# Patient Record
Sex: MALE | Race: WHITE | Employment: OTHER | URBAN - METROPOLITAN AREA
[De-identification: names, ages, dates, MRNs, and addresses within clinical notes are randomized per-mention and may not be internally consistent; named-entity substitution may affect disease eponyms.]

---

## 2017-02-08 ENCOUNTER — HOSPITAL ENCOUNTER (OUTPATIENT)
Age: 77
Setting detail: SPECIMEN
Discharge: HOME | End: 2017-02-08
Payer: MEDICARE

## 2017-02-21 LAB — DERMATOLOGY PATHOLOGY REPORT: NORMAL

## 2017-09-19 ENCOUNTER — HOSPITAL ENCOUNTER (INPATIENT)
Age: 77
LOS: 7 days | Discharge: NURSING, REHAB OR LONG-TERM CARE | DRG: 853 | End: 2017-09-26
Attending: EMERGENCY MEDICINE | Admitting: INTERNAL MEDICINE
Payer: MEDICARE

## 2017-09-19 ENCOUNTER — APPOINTMENT (OUTPATIENT)
Dept: CT IMAGING | Age: 77
DRG: 853 | End: 2017-09-19
Payer: MEDICARE

## 2017-09-19 ENCOUNTER — APPOINTMENT (OUTPATIENT)
Dept: GENERAL RADIOLOGY | Age: 77
DRG: 853 | End: 2017-09-19
Payer: MEDICARE

## 2017-09-19 DIAGNOSIS — N39.0 URINARY TRACT INFECTION WITHOUT HEMATURIA, SITE UNSPECIFIED: ICD-10-CM

## 2017-09-19 DIAGNOSIS — M54.16 LUMBAR RADICULOPATHY: Primary | ICD-10-CM

## 2017-09-19 DIAGNOSIS — E87.1 HYPONATREMIA: ICD-10-CM

## 2017-09-19 DIAGNOSIS — R79.89 ABNORMAL LIVER FUNCTION TESTS: ICD-10-CM

## 2017-09-19 DIAGNOSIS — J44.1 COPD EXACERBATION (HCC): ICD-10-CM

## 2017-09-19 LAB
% CKMB: 0.9 % (ref 0–3.5)
-: ABNORMAL
ABSOLUTE EOS #: 0 K/UL (ref 0–0.4)
ABSOLUTE LYMPH #: 0.73 K/UL (ref 1–4.8)
ABSOLUTE MONO #: 0.97 K/UL (ref 0.2–0.8)
ALBUMIN SERPL-MCNC: 3 G/DL (ref 3.5–5.2)
ALBUMIN/GLOBULIN RATIO: ABNORMAL (ref 1–2.5)
ALP BLD-CCNC: 64 U/L (ref 40–129)
ALT SERPL-CCNC: 55 U/L (ref 5–41)
AMMONIA: 27 UMOL/L (ref 16–60)
AMORPHOUS: ABNORMAL
AMYLASE: 33 U/L (ref 28–100)
ANION GAP SERPL CALCULATED.3IONS-SCNC: 20 MMOL/L (ref 9–17)
AST SERPL-CCNC: 84 U/L
BACTERIA: ABNORMAL
BASOPHILS # BLD: 0 %
BASOPHILS ABSOLUTE: 0 K/UL (ref 0–0.2)
BILIRUB SERPL-MCNC: 2.98 MG/DL (ref 0.3–1.2)
BILIRUBIN DIRECT: 1.21 MG/DL
BILIRUBIN URINE: NEGATIVE
BILIRUBIN, INDIRECT: 1.77 MG/DL (ref 0–1)
BNP INTERPRETATION: ABNORMAL
BUN BLDV-MCNC: 23 MG/DL (ref 8–23)
BUN/CREAT BLD: 22 (ref 9–20)
CALCIUM SERPL-MCNC: 8.7 MG/DL (ref 8.6–10.4)
CASTS UA: ABNORMAL /LPF
CHLORIDE BLD-SCNC: 88 MMOL/L (ref 98–107)
CHP ED QC CHECK: NORMAL
CK MB: 4.9 NG/ML
CKMB INTERPRETATION: ABNORMAL
CO2: 20 MMOL/L (ref 20–31)
COLOR: YELLOW
COMMENT UA: ABNORMAL
CORTISOL COLLECTION INFO: NORMAL
CORTISOL: 36.7 UG/DL
CREAT SERPL-MCNC: 1.05 MG/DL (ref 0.7–1.2)
CRYSTALS, UA: ABNORMAL /HPF
DIFFERENTIAL TYPE: ABNORMAL
EOSINOPHILS RELATIVE PERCENT: 0 %
EPITHELIAL CELLS UA: ABNORMAL /HPF
GFR AFRICAN AMERICAN: >60 ML/MIN
GFR NON-AFRICAN AMERICAN: >60 ML/MIN
GFR SERPL CREATININE-BSD FRML MDRD: ABNORMAL ML/MIN/{1.73_M2}
GFR SERPL CREATININE-BSD FRML MDRD: ABNORMAL ML/MIN/{1.73_M2}
GLOBULIN: ABNORMAL G/DL (ref 1.5–3.8)
GLUCOSE BLD-MCNC: 141 MG/DL (ref 70–99)
GLUCOSE BLD-MCNC: 176 MG/DL (ref 75–110)
GLUCOSE URINE: NEGATIVE
HAV IGM SER IA-ACNC: NONREACTIVE
HCT VFR BLD CALC: 48.4 % (ref 41–53)
HEMOGLOBIN: 16.7 G/DL (ref 13.5–17.5)
HEPATITIS B CORE IGM ANTIBODY: NONREACTIVE
HEPATITIS B SURFACE ANTIGEN: NONREACTIVE
HEPATITIS C ANTIBODY: NONREACTIVE
INR BLD: 1.1
KETONES, URINE: NEGATIVE
LACTIC ACID: 2.6 MMOL/L (ref 0.5–2.2)
LACTIC ACID: 3.4 MMOL/L (ref 0.5–2.2)
LACTIC ACID: 4.6 MMOL/L (ref 0.5–2.2)
LEUKOCYTE ESTERASE, URINE: ABNORMAL
LIPASE: 31 U/L (ref 13–60)
LIPASE: 43 U/L (ref 13–60)
LYMPHOCYTES # BLD: 6 %
MAGNESIUM: 2 MG/DL (ref 1.6–2.6)
MCH RBC QN AUTO: 30.3 PG (ref 26–34)
MCHC RBC AUTO-ENTMCNC: 34.6 G/DL (ref 31–37)
MCV RBC AUTO: 87.7 FL (ref 80–100)
MONOCYTES # BLD: 8 %
MORPHOLOGY: ABNORMAL
MUCUS: ABNORMAL
MYOGLOBIN: 307 NG/ML (ref 28–72)
NITRITE, URINE: POSITIVE
OTHER OBSERVATIONS UA: ABNORMAL
PARTIAL THROMBOPLASTIN TIME: 28.8 SEC (ref 23–31)
PDW BLD-RTO: 12.8 % (ref 11.5–14.5)
PH UA: 6 (ref 5–8)
PLATELET # BLD: 67 K/UL (ref 130–400)
PLATELET ESTIMATE: ABNORMAL
PMV BLD AUTO: ABNORMAL FL (ref 6–12)
POTASSIUM SERPL-SCNC: 3.7 MMOL/L (ref 3.7–5.3)
PRO-BNP: 2309 PG/ML
PROTEIN UA: ABNORMAL
PROTHROMBIN TIME: 11.4 SEC (ref 9.7–11.6)
RBC # BLD: 5.51 M/UL (ref 4.5–5.9)
RBC # BLD: ABNORMAL 10*6/UL
RBC UA: ABNORMAL /HPF (ref 0–2)
RENAL EPITHELIAL, UA: ABNORMAL /HPF
SEG NEUTROPHILS: 86 %
SEGMENTED NEUTROPHILS ABSOLUTE COUNT: 10.4 K/UL (ref 1.8–7.7)
SODIUM BLD-SCNC: 128 MMOL/L (ref 135–144)
SPECIFIC GRAVITY UA: 1.02 (ref 1–1.03)
TOTAL CK: 539 U/L (ref 39–308)
TOTAL PROTEIN: 7.1 G/DL (ref 6.4–8.3)
TRICHOMONAS: ABNORMAL
TROPONIN INTERP: NORMAL
TROPONIN T: <0.03 NG/ML
TSH SERPL DL<=0.05 MIU/L-ACNC: 0.8 MIU/L (ref 0.3–5)
TURBIDITY: ABNORMAL
URINE HGB: ABNORMAL
UROBILINOGEN, URINE: NORMAL
WBC # BLD: 12.1 K/UL (ref 3.5–11)
WBC # BLD: ABNORMAL 10*3/UL
WBC UA: ABNORMAL /HPF (ref 0–5)
YEAST: ABNORMAL

## 2017-09-19 PROCEDURE — 83690 ASSAY OF LIPASE: CPT

## 2017-09-19 PROCEDURE — 81001 URINALYSIS AUTO W/SCOPE: CPT

## 2017-09-19 PROCEDURE — 6370000000 HC RX 637 (ALT 250 FOR IP): Performed by: INTERNAL MEDICINE

## 2017-09-19 PROCEDURE — 83605 ASSAY OF LACTIC ACID: CPT

## 2017-09-19 PROCEDURE — 71010 XR CHEST PORTABLE: CPT

## 2017-09-19 PROCEDURE — 85610 PROTHROMBIN TIME: CPT

## 2017-09-19 PROCEDURE — 83735 ASSAY OF MAGNESIUM: CPT

## 2017-09-19 PROCEDURE — 74177 CT ABD & PELVIS W/CONTRAST: CPT

## 2017-09-19 PROCEDURE — 96375 TX/PRO/DX INJ NEW DRUG ADDON: CPT

## 2017-09-19 PROCEDURE — 85025 COMPLETE CBC W/AUTO DIFF WBC: CPT

## 2017-09-19 PROCEDURE — 82550 ASSAY OF CK (CPK): CPT

## 2017-09-19 PROCEDURE — 82947 ASSAY GLUCOSE BLOOD QUANT: CPT

## 2017-09-19 PROCEDURE — 84134 ASSAY OF PREALBUMIN: CPT

## 2017-09-19 PROCEDURE — 80076 HEPATIC FUNCTION PANEL: CPT

## 2017-09-19 PROCEDURE — 85730 THROMBOPLASTIN TIME PARTIAL: CPT

## 2017-09-19 PROCEDURE — 72131 CT LUMBAR SPINE W/O DYE: CPT

## 2017-09-19 PROCEDURE — 93005 ELECTROCARDIOGRAM TRACING: CPT

## 2017-09-19 PROCEDURE — 83874 ASSAY OF MYOGLOBIN: CPT

## 2017-09-19 PROCEDURE — 2580000003 HC RX 258: Performed by: INTERNAL MEDICINE

## 2017-09-19 PROCEDURE — 6360000002 HC RX W HCPCS: Performed by: INTERNAL MEDICINE

## 2017-09-19 PROCEDURE — 80074 ACUTE HEPATITIS PANEL: CPT

## 2017-09-19 PROCEDURE — 86403 PARTICLE AGGLUT ANTBDY SCRN: CPT

## 2017-09-19 PROCEDURE — 36415 COLL VENOUS BLD VENIPUNCTURE: CPT

## 2017-09-19 PROCEDURE — 83880 ASSAY OF NATRIURETIC PEPTIDE: CPT

## 2017-09-19 PROCEDURE — 84484 ASSAY OF TROPONIN QUANT: CPT

## 2017-09-19 PROCEDURE — 84443 ASSAY THYROID STIM HORMONE: CPT

## 2017-09-19 PROCEDURE — 82553 CREATINE MB FRACTION: CPT

## 2017-09-19 PROCEDURE — 96365 THER/PROPH/DIAG IV INF INIT: CPT

## 2017-09-19 PROCEDURE — 87147 CULTURE TYPE IMMUNOLOGIC: CPT

## 2017-09-19 PROCEDURE — 94640 AIRWAY INHALATION TREATMENT: CPT

## 2017-09-19 PROCEDURE — 87040 BLOOD CULTURE FOR BACTERIA: CPT

## 2017-09-19 PROCEDURE — 87205 SMEAR GRAM STAIN: CPT

## 2017-09-19 PROCEDURE — 82150 ASSAY OF AMYLASE: CPT

## 2017-09-19 PROCEDURE — 82533 TOTAL CORTISOL: CPT

## 2017-09-19 PROCEDURE — 2580000003 HC RX 258: Performed by: EMERGENCY MEDICINE

## 2017-09-19 PROCEDURE — 82140 ASSAY OF AMMONIA: CPT

## 2017-09-19 PROCEDURE — 87149 DNA/RNA DIRECT PROBE: CPT

## 2017-09-19 PROCEDURE — 87086 URINE CULTURE/COLONY COUNT: CPT

## 2017-09-19 PROCEDURE — 6360000002 HC RX W HCPCS: Performed by: EMERGENCY MEDICINE

## 2017-09-19 PROCEDURE — 71260 CT THORAX DX C+: CPT

## 2017-09-19 PROCEDURE — 6360000004 HC RX CONTRAST MEDICATION: Performed by: EMERGENCY MEDICINE

## 2017-09-19 PROCEDURE — 83036 HEMOGLOBIN GLYCOSYLATED A1C: CPT

## 2017-09-19 PROCEDURE — 87186 SC STD MICRODIL/AGAR DIL: CPT

## 2017-09-19 PROCEDURE — 80048 BASIC METABOLIC PNL TOTAL CA: CPT

## 2017-09-19 PROCEDURE — 99285 EMERGENCY DEPT VISIT HI MDM: CPT

## 2017-09-19 PROCEDURE — 2060000000 HC ICU INTERMEDIATE R&B

## 2017-09-19 PROCEDURE — 94760 N-INVAS EAR/PLS OXIMETRY 1: CPT

## 2017-09-19 RX ORDER — CETIRIZINE HYDROCHLORIDE 10 MG/1
10 TABLET ORAL DAILY
Status: DISCONTINUED | OUTPATIENT
Start: 2017-09-19 | End: 2017-09-26 | Stop reason: HOSPADM

## 2017-09-19 RX ORDER — ALBUTEROL SULFATE 90 UG/1
2 AEROSOL, METERED RESPIRATORY (INHALATION)
Status: DISCONTINUED | OUTPATIENT
Start: 2017-09-19 | End: 2017-09-19

## 2017-09-19 RX ORDER — METHYLPREDNISOLONE SODIUM SUCCINATE 40 MG/ML
40 INJECTION, POWDER, LYOPHILIZED, FOR SOLUTION INTRAMUSCULAR; INTRAVENOUS EVERY 6 HOURS
Status: DISCONTINUED | OUTPATIENT
Start: 2017-09-19 | End: 2017-09-20

## 2017-09-19 RX ORDER — AMOXICILLIN 500 MG
1 CAPSULE ORAL DAILY
Status: ON HOLD | COMMUNITY
End: 2017-09-26 | Stop reason: HOSPADM

## 2017-09-19 RX ORDER — SODIUM CHLORIDE 0.9 % (FLUSH) 0.9 %
10 SYRINGE (ML) INJECTION PRN
Status: DISCONTINUED | OUTPATIENT
Start: 2017-09-19 | End: 2017-09-26 | Stop reason: HOSPADM

## 2017-09-19 RX ORDER — 0.9 % SODIUM CHLORIDE 0.9 %
50 INTRAVENOUS SOLUTION INTRAVENOUS ONCE
Status: COMPLETED | OUTPATIENT
Start: 2017-09-19 | End: 2017-09-19

## 2017-09-19 RX ORDER — ALBUTEROL SULFATE 2.5 MG/3ML
5 SOLUTION RESPIRATORY (INHALATION)
Status: DISCONTINUED | OUTPATIENT
Start: 2017-09-19 | End: 2017-09-19

## 2017-09-19 RX ORDER — DEXTROSE MONOHYDRATE 25 G/50ML
12.5 INJECTION, SOLUTION INTRAVENOUS PRN
Status: DISCONTINUED | OUTPATIENT
Start: 2017-09-19 | End: 2017-09-26 | Stop reason: HOSPADM

## 2017-09-19 RX ORDER — CHOLECALCIFEROL (VITAMIN D3) 125 MCG
500 CAPSULE ORAL DAILY
COMMUNITY
End: 2018-02-07 | Stop reason: ALTCHOICE

## 2017-09-19 RX ORDER — DOXAZOSIN MESYLATE 4 MG/1
4 TABLET ORAL NIGHTLY
Status: DISCONTINUED | OUTPATIENT
Start: 2017-09-19 | End: 2017-09-26 | Stop reason: HOSPADM

## 2017-09-19 RX ORDER — ALBUTEROL SULFATE 2.5 MG/3ML
2.5 SOLUTION RESPIRATORY (INHALATION)
Status: DISCONTINUED | OUTPATIENT
Start: 2017-09-20 | End: 2017-09-22

## 2017-09-19 RX ORDER — SODIUM CHLORIDE 0.9 % (FLUSH) 0.9 %
10 SYRINGE (ML) INJECTION 2 TIMES DAILY
Status: DISCONTINUED | OUTPATIENT
Start: 2017-09-19 | End: 2017-09-19 | Stop reason: SDUPTHER

## 2017-09-19 RX ORDER — MORPHINE SULFATE 4 MG/ML
4 INJECTION, SOLUTION INTRAMUSCULAR; INTRAVENOUS ONCE
Status: COMPLETED | OUTPATIENT
Start: 2017-09-19 | End: 2017-09-19

## 2017-09-19 RX ORDER — METHYLPREDNISOLONE SODIUM SUCCINATE 125 MG/2ML
125 INJECTION, POWDER, LYOPHILIZED, FOR SOLUTION INTRAMUSCULAR; INTRAVENOUS ONCE
Status: COMPLETED | OUTPATIENT
Start: 2017-09-19 | End: 2017-09-19

## 2017-09-19 RX ORDER — ALBUTEROL SULFATE 2.5 MG/3ML
2.5 SOLUTION RESPIRATORY (INHALATION)
Status: DISCONTINUED | OUTPATIENT
Start: 2017-09-19 | End: 2017-09-20 | Stop reason: SDUPTHER

## 2017-09-19 RX ORDER — ACETAMINOPHEN 325 MG/1
650 TABLET ORAL EVERY 4 HOURS PRN
Status: DISCONTINUED | OUTPATIENT
Start: 2017-09-19 | End: 2017-09-19 | Stop reason: SDUPTHER

## 2017-09-19 RX ORDER — AMPICILLIN TRIHYDRATE 250 MG
500 CAPSULE ORAL 2 TIMES DAILY
Status: ON HOLD | COMMUNITY
End: 2017-09-26 | Stop reason: HOSPADM

## 2017-09-19 RX ORDER — SODIUM CHLORIDE 0.9 % (FLUSH) 0.9 %
10 SYRINGE (ML) INJECTION EVERY 12 HOURS SCHEDULED
Status: DISCONTINUED | OUTPATIENT
Start: 2017-09-19 | End: 2017-09-19 | Stop reason: SDUPTHER

## 2017-09-19 RX ORDER — MAGNESIUM OXIDE 400 MG/1
400 TABLET ORAL DAILY
COMMUNITY
End: 2018-02-07 | Stop reason: ALTCHOICE

## 2017-09-19 RX ORDER — IPRATROPIUM BROMIDE AND ALBUTEROL SULFATE 2.5; .5 MG/3ML; MG/3ML
1 SOLUTION RESPIRATORY (INHALATION)
Status: DISCONTINUED | OUTPATIENT
Start: 2017-09-19 | End: 2017-09-19

## 2017-09-19 RX ORDER — ATORVASTATIN CALCIUM 20 MG/1
20 TABLET, FILM COATED ORAL DAILY
Status: DISCONTINUED | OUTPATIENT
Start: 2017-09-20 | End: 2017-09-24

## 2017-09-19 RX ORDER — ATORVASTATIN CALCIUM 20 MG/1
20 TABLET, FILM COATED ORAL DAILY
COMMUNITY
End: 2018-02-07 | Stop reason: ALTCHOICE

## 2017-09-19 RX ORDER — SODIUM CHLORIDE 0.9 % (FLUSH) 0.9 %
10 SYRINGE (ML) INJECTION PRN
Status: DISCONTINUED | OUTPATIENT
Start: 2017-09-19 | End: 2017-09-19 | Stop reason: SDUPTHER

## 2017-09-19 RX ORDER — SODIUM CHLORIDE 0.9 % (FLUSH) 0.9 %
10 SYRINGE (ML) INJECTION EVERY 12 HOURS SCHEDULED
Status: DISCONTINUED | OUTPATIENT
Start: 2017-09-19 | End: 2017-09-26 | Stop reason: HOSPADM

## 2017-09-19 RX ORDER — NICOTINE POLACRILEX 4 MG
15 LOZENGE BUCCAL PRN
Status: DISCONTINUED | OUTPATIENT
Start: 2017-09-19 | End: 2017-09-26 | Stop reason: HOSPADM

## 2017-09-19 RX ORDER — SODIUM CHLORIDE 9 MG/ML
INJECTION, SOLUTION INTRAVENOUS CONTINUOUS
Status: ACTIVE | OUTPATIENT
Start: 2017-09-19 | End: 2017-09-20

## 2017-09-19 RX ORDER — ACETAMINOPHEN 325 MG/1
650 TABLET ORAL EVERY 4 HOURS PRN
Status: DISCONTINUED | OUTPATIENT
Start: 2017-09-19 | End: 2017-09-22 | Stop reason: SDUPTHER

## 2017-09-19 RX ORDER — DEXTROSE MONOHYDRATE 50 MG/ML
100 INJECTION, SOLUTION INTRAVENOUS PRN
Status: DISCONTINUED | OUTPATIENT
Start: 2017-09-19 | End: 2017-09-26 | Stop reason: HOSPADM

## 2017-09-19 RX ORDER — DOXAZOSIN MESYLATE 4 MG/1
4 TABLET ORAL NIGHTLY
COMMUNITY
End: 2018-01-22 | Stop reason: ALTCHOICE

## 2017-09-19 RX ORDER — LEVOFLOXACIN 500 MG/1
500 TABLET, FILM COATED ORAL DAILY
Status: DISCONTINUED | OUTPATIENT
Start: 2017-09-20 | End: 2017-09-20 | Stop reason: ALTCHOICE

## 2017-09-19 RX ORDER — ONDANSETRON 2 MG/ML
4 INJECTION INTRAMUSCULAR; INTRAVENOUS ONCE
Status: COMPLETED | OUTPATIENT
Start: 2017-09-19 | End: 2017-09-19

## 2017-09-19 RX ORDER — CLOTRIMAZOLE AND BETAMETHASONE DIPROPIONATE 10; .64 MG/G; MG/G
CREAM TOPICAL 2 TIMES DAILY
Status: DISCONTINUED | OUTPATIENT
Start: 2017-09-19 | End: 2017-09-26 | Stop reason: HOSPADM

## 2017-09-19 RX ORDER — ONDANSETRON 2 MG/ML
4 INJECTION INTRAMUSCULAR; INTRAVENOUS EVERY 6 HOURS PRN
Status: DISCONTINUED | OUTPATIENT
Start: 2017-09-19 | End: 2017-09-22 | Stop reason: SDUPTHER

## 2017-09-19 RX ADMIN — METHYLPREDNISOLONE SODIUM SUCCINATE 125 MG: 125 INJECTION, POWDER, FOR SOLUTION INTRAMUSCULAR; INTRAVENOUS at 14:36

## 2017-09-19 RX ADMIN — IOPAMIDOL 125 ML: 755 INJECTION, SOLUTION INTRAVENOUS at 12:47

## 2017-09-19 RX ADMIN — CEFTRIAXONE SODIUM 1 G: 1 INJECTION, POWDER, FOR SOLUTION INTRAMUSCULAR; INTRAVENOUS at 14:38

## 2017-09-19 RX ADMIN — CETIRIZINE HYDROCHLORIDE 10 MG: 10 TABLET, FILM COATED ORAL at 23:50

## 2017-09-19 RX ADMIN — Medication 10 ML: at 23:51

## 2017-09-19 RX ADMIN — ALBUTEROL SULFATE 5 MG: 5 SOLUTION RESPIRATORY (INHALATION) at 14:35

## 2017-09-19 RX ADMIN — SODIUM CHLORIDE 50 ML: 9 INJECTION, SOLUTION INTRAVENOUS at 12:48

## 2017-09-19 RX ADMIN — SODIUM CHLORIDE, PRESERVATIVE FREE 10 ML: 5 INJECTION INTRAVENOUS at 12:47

## 2017-09-19 RX ADMIN — CLOTRIMAZOLE AND BETAMETHASONE DIPROPIONATE: 10; .5 CREAM TOPICAL at 23:51

## 2017-09-19 RX ADMIN — MORPHINE SULFATE 4 MG: 4 INJECTION, SOLUTION INTRAMUSCULAR; INTRAVENOUS at 11:14

## 2017-09-19 RX ADMIN — MORPHINE SULFATE 4 MG: 4 INJECTION, SOLUTION INTRAMUSCULAR; INTRAVENOUS at 16:06

## 2017-09-19 RX ADMIN — ALBUTEROL SULFATE 2.5 MG: 2.5 SOLUTION RESPIRATORY (INHALATION) at 21:53

## 2017-09-19 RX ADMIN — METHYLPREDNISOLONE SODIUM SUCCINATE 40 MG: 40 INJECTION, POWDER, FOR SOLUTION INTRAMUSCULAR; INTRAVENOUS at 23:51

## 2017-09-19 RX ADMIN — ONDANSETRON 4 MG: 2 INJECTION INTRAMUSCULAR; INTRAVENOUS at 11:14

## 2017-09-19 ASSESSMENT — ENCOUNTER SYMPTOMS
ABDOMINAL DISTENTION: 1
COUGH: 1
VOMITING: 0
SHORTNESS OF BREATH: 1
WHEEZING: 1
ABDOMINAL PAIN: 1
BACK PAIN: 1

## 2017-09-19 ASSESSMENT — PAIN DESCRIPTION - PAIN TYPE: TYPE: ACUTE PAIN;CHRONIC PAIN

## 2017-09-19 ASSESSMENT — PAIN DESCRIPTION - LOCATION: LOCATION: GENERALIZED;BACK;KNEE

## 2017-09-19 ASSESSMENT — PAIN DESCRIPTION - ORIENTATION: ORIENTATION: RIGHT;LEFT

## 2017-09-19 ASSESSMENT — PAIN SCALES - GENERAL
PAINLEVEL_OUTOF10: 10
PAINLEVEL_OUTOF10: 10
PAINLEVEL_OUTOF10: 4
PAINLEVEL_OUTOF10: 5
PAINLEVEL_OUTOF10: 6

## 2017-09-19 ASSESSMENT — PAIN DESCRIPTION - DESCRIPTORS: DESCRIPTORS: ACHING;SHARP;STABBING

## 2017-09-20 ENCOUNTER — APPOINTMENT (OUTPATIENT)
Dept: GENERAL RADIOLOGY | Age: 77
DRG: 853 | End: 2017-09-20
Payer: MEDICARE

## 2017-09-20 ENCOUNTER — APPOINTMENT (OUTPATIENT)
Dept: ULTRASOUND IMAGING | Age: 77
DRG: 853 | End: 2017-09-20
Payer: MEDICARE

## 2017-09-20 PROBLEM — I71.40 AAA (ABDOMINAL AORTIC ANEURYSM) WITHOUT RUPTURE: Chronic | Status: ACTIVE | Noted: 2017-09-20

## 2017-09-20 LAB
ALBUMIN SERPL-MCNC: 2.4 G/DL (ref 3.5–5.2)
ALBUMIN/GLOBULIN RATIO: ABNORMAL (ref 1–2.5)
ALP BLD-CCNC: 91 U/L (ref 40–129)
ALT SERPL-CCNC: 62 U/L (ref 5–41)
ANION GAP SERPL CALCULATED.3IONS-SCNC: 16 MMOL/L (ref 9–17)
AST SERPL-CCNC: 121 U/L
BILIRUB SERPL-MCNC: 1.78 MG/DL (ref 0.3–1.2)
BILIRUBIN DIRECT: 0.9 MG/DL
BILIRUBIN, INDIRECT: 0.88 MG/DL (ref 0–1)
BUN BLDV-MCNC: 38 MG/DL (ref 8–23)
CALCIUM SERPL-MCNC: 8.3 MG/DL (ref 8.6–10.4)
CHLORIDE BLD-SCNC: 93 MMOL/L (ref 98–107)
CO2: 20 MMOL/L (ref 20–31)
CREAT SERPL-MCNC: 0.96 MG/DL (ref 0.7–1.2)
ESTIMATED AVERAGE GLUCOSE: 123 MG/DL
GFR AFRICAN AMERICAN: >60 ML/MIN
GFR NON-AFRICAN AMERICAN: >60 ML/MIN
GFR SERPL CREATININE-BSD FRML MDRD: ABNORMAL ML/MIN/{1.73_M2}
GFR SERPL CREATININE-BSD FRML MDRD: ABNORMAL ML/MIN/{1.73_M2}
GLUCOSE BLD-MCNC: 177 MG/DL (ref 75–110)
GLUCOSE BLD-MCNC: 199 MG/DL (ref 75–110)
GLUCOSE BLD-MCNC: 203 MG/DL (ref 75–110)
GLUCOSE BLD-MCNC: 206 MG/DL (ref 75–110)
GLUCOSE BLD-MCNC: 244 MG/DL (ref 70–99)
HBA1C MFR BLD: 5.9 % (ref 4–6)
HCT VFR BLD CALC: 41.5 % (ref 41–53)
HEMOGLOBIN: 14.2 G/DL (ref 13.5–17.5)
INR BLD: 1.1
LV EF: 55 %
LVEF MODALITY: NORMAL
MCH RBC QN AUTO: 30.5 PG (ref 26–34)
MCHC RBC AUTO-ENTMCNC: 34.3 G/DL (ref 31–37)
MCV RBC AUTO: 89.1 FL (ref 80–100)
PARTIAL THROMBOPLASTIN TIME: 27.5 SEC (ref 23–31)
PDW BLD-RTO: 13.8 % (ref 11.5–14.5)
PLATELET # BLD: 69 K/UL (ref 130–400)
PMV BLD AUTO: 10.7 FL (ref 6–12)
POTASSIUM SERPL-SCNC: 4.1 MMOL/L (ref 3.7–5.3)
PREALBUMIN: 4.5 MG/DL (ref 20–40)
PROTHROMBIN TIME: 10.9 SEC (ref 9.7–11.6)
RBC # BLD: 4.65 M/UL (ref 4.5–5.9)
SODIUM BLD-SCNC: 129 MMOL/L (ref 135–144)
TOTAL PROTEIN: 6 G/DL (ref 6.4–8.3)
WBC # BLD: 10.7 K/UL (ref 3.5–11)

## 2017-09-20 PROCEDURE — G8988 SELF CARE GOAL STATUS: HCPCS

## 2017-09-20 PROCEDURE — 93880 EXTRACRANIAL BILAT STUDY: CPT

## 2017-09-20 PROCEDURE — 87205 SMEAR GRAM STAIN: CPT

## 2017-09-20 PROCEDURE — 6370000000 HC RX 637 (ALT 250 FOR IP): Performed by: INTERNAL MEDICINE

## 2017-09-20 PROCEDURE — 36415 COLL VENOUS BLD VENIPUNCTURE: CPT

## 2017-09-20 PROCEDURE — 82947 ASSAY GLUCOSE BLOOD QUANT: CPT

## 2017-09-20 PROCEDURE — 86403 PARTICLE AGGLUT ANTBDY SCRN: CPT

## 2017-09-20 PROCEDURE — 82248 BILIRUBIN DIRECT: CPT

## 2017-09-20 PROCEDURE — 84134 ASSAY OF PREALBUMIN: CPT

## 2017-09-20 PROCEDURE — 85027 COMPLETE CBC AUTOMATED: CPT

## 2017-09-20 PROCEDURE — 87070 CULTURE OTHR SPECIMN AEROBIC: CPT

## 2017-09-20 PROCEDURE — 2580000003 HC RX 258: Performed by: INTERNAL MEDICINE

## 2017-09-20 PROCEDURE — 76705 ECHO EXAM OF ABDOMEN: CPT

## 2017-09-20 PROCEDURE — 94640 AIRWAY INHALATION TREATMENT: CPT

## 2017-09-20 PROCEDURE — 97535 SELF CARE MNGMENT TRAINING: CPT

## 2017-09-20 PROCEDURE — 2060000000 HC ICU INTERMEDIATE R&B

## 2017-09-20 PROCEDURE — 97116 GAIT TRAINING THERAPY: CPT

## 2017-09-20 PROCEDURE — 97530 THERAPEUTIC ACTIVITIES: CPT

## 2017-09-20 PROCEDURE — 2700000000 HC OXYGEN THERAPY PER DAY

## 2017-09-20 PROCEDURE — 85730 THROMBOPLASTIN TIME PARTIAL: CPT

## 2017-09-20 PROCEDURE — G8987 SELF CARE CURRENT STATUS: HCPCS

## 2017-09-20 PROCEDURE — 6360000002 HC RX W HCPCS: Performed by: INTERNAL MEDICINE

## 2017-09-20 PROCEDURE — G8979 MOBILITY GOAL STATUS: HCPCS

## 2017-09-20 PROCEDURE — 97166 OT EVAL MOD COMPLEX 45 MIN: CPT

## 2017-09-20 PROCEDURE — 94760 N-INVAS EAR/PLS OXIMETRY 1: CPT

## 2017-09-20 PROCEDURE — 93306 TTE W/DOPPLER COMPLETE: CPT

## 2017-09-20 PROCEDURE — 87040 BLOOD CULTURE FOR BACTERIA: CPT

## 2017-09-20 PROCEDURE — G8978 MOBILITY CURRENT STATUS: HCPCS

## 2017-09-20 PROCEDURE — 97162 PT EVAL MOD COMPLEX 30 MIN: CPT

## 2017-09-20 PROCEDURE — 80053 COMPREHEN METABOLIC PANEL: CPT

## 2017-09-20 PROCEDURE — 71010 XR CHEST PORTABLE: CPT

## 2017-09-20 PROCEDURE — 85610 PROTHROMBIN TIME: CPT

## 2017-09-20 PROCEDURE — 87147 CULTURE TYPE IMMUNOLOGIC: CPT

## 2017-09-20 RX ORDER — ALBUTEROL SULFATE 2.5 MG/3ML
2.5 SOLUTION RESPIRATORY (INHALATION)
Status: DISCONTINUED | OUTPATIENT
Start: 2017-09-20 | End: 2017-09-26 | Stop reason: HOSPADM

## 2017-09-20 RX ORDER — METHYLPREDNISOLONE SODIUM SUCCINATE 40 MG/ML
40 INJECTION, POWDER, LYOPHILIZED, FOR SOLUTION INTRAMUSCULAR; INTRAVENOUS EVERY 12 HOURS
Status: DISCONTINUED | OUTPATIENT
Start: 2017-09-20 | End: 2017-09-23

## 2017-09-20 RX ORDER — 0.9 % SODIUM CHLORIDE 0.9 %
1000 INTRAVENOUS SOLUTION INTRAVENOUS ONCE
Status: COMPLETED | OUTPATIENT
Start: 2017-09-20 | End: 2017-09-20

## 2017-09-20 RX ADMIN — DOXAZOSIN 4 MG: 4 TABLET ORAL at 00:39

## 2017-09-20 RX ADMIN — VANCOMYCIN HYDROCHLORIDE 1750 MG: 500 INJECTION, POWDER, LYOPHILIZED, FOR SOLUTION INTRAVENOUS at 15:35

## 2017-09-20 RX ADMIN — INSULIN LISPRO 2 UNITS: 100 INJECTION, SOLUTION INTRAVENOUS; SUBCUTANEOUS at 12:50

## 2017-09-20 RX ADMIN — SODIUM CHLORIDE: 9 INJECTION, SOLUTION INTRAVENOUS at 00:15

## 2017-09-20 RX ADMIN — CLOTRIMAZOLE AND BETAMETHASONE DIPROPIONATE: 10; .5 CREAM TOPICAL at 21:35

## 2017-09-20 RX ADMIN — Medication 400 MG: at 08:58

## 2017-09-20 RX ADMIN — ATORVASTATIN CALCIUM 20 MG: 20 TABLET, FILM COATED ORAL at 08:58

## 2017-09-20 RX ADMIN — INSULIN LISPRO 1 UNITS: 100 INJECTION, SOLUTION INTRAVENOUS; SUBCUTANEOUS at 00:32

## 2017-09-20 RX ADMIN — METHYLPREDNISOLONE SODIUM SUCCINATE 40 MG: 40 INJECTION, POWDER, FOR SOLUTION INTRAMUSCULAR; INTRAVENOUS at 18:06

## 2017-09-20 RX ADMIN — ALBUTEROL SULFATE 2.5 MG: 2.5 SOLUTION RESPIRATORY (INHALATION) at 20:02

## 2017-09-20 RX ADMIN — INSULIN LISPRO 1 UNITS: 100 INJECTION, SOLUTION INTRAVENOUS; SUBCUTANEOUS at 18:07

## 2017-09-20 RX ADMIN — ACETAMINOPHEN 650 MG: 325 TABLET ORAL at 08:58

## 2017-09-20 RX ADMIN — METHYLPREDNISOLONE SODIUM SUCCINATE 40 MG: 40 INJECTION, POWDER, FOR SOLUTION INTRAMUSCULAR; INTRAVENOUS at 06:16

## 2017-09-20 RX ADMIN — ACETAMINOPHEN 650 MG: 325 TABLET ORAL at 12:51

## 2017-09-20 RX ADMIN — ACETAMINOPHEN 650 MG: 325 TABLET ORAL at 00:50

## 2017-09-20 RX ADMIN — CLOTRIMAZOLE AND BETAMETHASONE DIPROPIONATE: 10; .5 CREAM TOPICAL at 09:04

## 2017-09-20 RX ADMIN — VANCOMYCIN HYDROCHLORIDE 1750 MG: 500 INJECTION, POWDER, LYOPHILIZED, FOR SOLUTION INTRAVENOUS at 03:09

## 2017-09-20 RX ADMIN — INSULIN LISPRO 1 UNITS: 100 INJECTION, SOLUTION INTRAVENOUS; SUBCUTANEOUS at 09:15

## 2017-09-20 RX ADMIN — Medication 10 ML: at 21:35

## 2017-09-20 RX ADMIN — ALBUTEROL SULFATE 2.5 MG: 2.5 SOLUTION RESPIRATORY (INHALATION) at 07:23

## 2017-09-20 RX ADMIN — ACETAMINOPHEN 650 MG: 325 TABLET ORAL at 22:01

## 2017-09-20 RX ADMIN — LEVOFLOXACIN 500 MG: 500 TABLET, FILM COATED ORAL at 08:58

## 2017-09-20 RX ADMIN — SODIUM CHLORIDE 1000 ML: 9 INJECTION, SOLUTION INTRAVENOUS at 10:15

## 2017-09-20 RX ADMIN — CETIRIZINE HYDROCHLORIDE 10 MG: 10 TABLET, FILM COATED ORAL at 08:58

## 2017-09-20 RX ADMIN — INSULIN LISPRO 1 UNITS: 100 INJECTION, SOLUTION INTRAVENOUS; SUBCUTANEOUS at 21:36

## 2017-09-20 ASSESSMENT — PAIN DESCRIPTION - LOCATION
LOCATION: BACK;LEG
LOCATION: BACK;GENERALIZED;LEG
LOCATION: KNEE
LOCATION: KNEE;GENERALIZED

## 2017-09-20 ASSESSMENT — PAIN SCALES - GENERAL
PAINLEVEL_OUTOF10: 4
PAINLEVEL_OUTOF10: 5
PAINLEVEL_OUTOF10: 2
PAINLEVEL_OUTOF10: 5
PAINLEVEL_OUTOF10: 4
PAINLEVEL_OUTOF10: 6

## 2017-09-20 ASSESSMENT — PAIN DESCRIPTION - ORIENTATION
ORIENTATION: LEFT
ORIENTATION: RIGHT;LEFT;UPPER

## 2017-09-20 ASSESSMENT — PAIN DESCRIPTION - PAIN TYPE
TYPE: ACUTE PAIN
TYPE: ACUTE PAIN;CHRONIC PAIN
TYPE: ACUTE PAIN
TYPE: CHRONIC PAIN

## 2017-09-21 ENCOUNTER — APPOINTMENT (OUTPATIENT)
Dept: MRI IMAGING | Age: 77
DRG: 853 | End: 2017-09-21
Payer: MEDICARE

## 2017-09-21 PROBLEM — N39.0 UTI (URINARY TRACT INFECTION): Status: ACTIVE | Noted: 2017-09-21

## 2017-09-21 PROBLEM — R78.81 BACTEREMIA DUE TO STAPHYLOCOCCUS: Status: ACTIVE | Noted: 2017-09-21

## 2017-09-21 PROBLEM — J44.1 COPD EXACERBATION (HCC): Status: ACTIVE | Noted: 2017-09-21

## 2017-09-21 PROBLEM — M17.12 PRIMARY OSTEOARTHRITIS OF LEFT KNEE: Status: ACTIVE | Noted: 2017-09-21

## 2017-09-21 PROBLEM — M25.462 KNEE EFFUSION, LEFT: Status: ACTIVE | Noted: 2017-09-21

## 2017-09-21 PROBLEM — M43.10 DEGENERATIVE SPONDYLOLISTHESIS: Status: ACTIVE | Noted: 2017-09-21

## 2017-09-21 PROBLEM — M47.817 LUMBAR AND SACRAL OSTEOARTHRITIS: Status: ACTIVE | Noted: 2017-09-21

## 2017-09-21 PROBLEM — M51.35 DDD (DEGENERATIVE DISC DISEASE), THORACOLUMBAR: Status: ACTIVE | Noted: 2017-09-21

## 2017-09-21 PROBLEM — R97.20 ELEVATED PSA: Status: ACTIVE | Noted: 2017-09-21

## 2017-09-21 PROBLEM — B95.8 BACTEREMIA DUE TO STAPHYLOCOCCUS: Status: ACTIVE | Noted: 2017-09-21

## 2017-09-21 PROBLEM — M48.062 LUMBAR STENOSIS WITH NEUROGENIC CLAUDICATION: Status: ACTIVE | Noted: 2017-09-21

## 2017-09-21 LAB
ABSOLUTE EOS #: 0 K/UL (ref 0–0.4)
ABSOLUTE EOS #: 0 K/UL (ref 0–0.4)
ABSOLUTE LYMPH #: 0.6 K/UL (ref 1–4.8)
ABSOLUTE LYMPH #: 0.8 K/UL (ref 1–4.8)
ABSOLUTE MONO #: 0.4 K/UL (ref 0.2–0.8)
ABSOLUTE MONO #: 0.8 K/UL (ref 0.1–1.2)
ABSOLUTE RETIC #: 0.06 M/UL (ref 0.02–0.1)
ANION GAP SERPL CALCULATED.3IONS-SCNC: 12 MMOL/L (ref 9–17)
BASOPHILS # BLD: 0 %
BASOPHILS # BLD: 0 %
BASOPHILS ABSOLUTE: 0 K/UL (ref 0–0.2)
BASOPHILS ABSOLUTE: 0 K/UL (ref 0–0.2)
BUN BLDV-MCNC: 35 MG/DL (ref 8–23)
BUN/CREAT BLD: 43 (ref 9–20)
C-REACTIVE PROTEIN: 190.6 MG/L (ref 0–5)
CALCIUM SERPL-MCNC: 8.2 MG/DL (ref 8.6–10.4)
CHLORIDE BLD-SCNC: 97 MMOL/L (ref 98–107)
CO2: 25 MMOL/L (ref 20–31)
CREAT SERPL-MCNC: 0.82 MG/DL (ref 0.7–1.2)
CULTURE: ABNORMAL
DIFFERENTIAL TYPE: ABNORMAL
DIFFERENTIAL TYPE: ABNORMAL
EKG ATRIAL RATE: 91 BPM
EKG Q-T INTERVAL: 368 MS
EKG QRS DURATION: 92 MS
EKG QTC CALCULATION (BAZETT): 452 MS
EKG R AXIS: 54 DEGREES
EKG T AXIS: 5 DEGREES
EKG VENTRICULAR RATE: 91 BPM
EOSINOPHILS RELATIVE PERCENT: 0 %
EOSINOPHILS RELATIVE PERCENT: 0 %
FERRITIN: 2962 UG/L (ref 30–400)
FOLATE: 14.3 NG/ML
FREE KAPPA/LAMBDA RATIO: 1.24 (ref 0.26–1.65)
GFR AFRICAN AMERICAN: >60 ML/MIN
GFR NON-AFRICAN AMERICAN: >60 ML/MIN
GFR SERPL CREATININE-BSD FRML MDRD: ABNORMAL ML/MIN/{1.73_M2}
GFR SERPL CREATININE-BSD FRML MDRD: ABNORMAL ML/MIN/{1.73_M2}
GLUCOSE BLD-MCNC: 186 MG/DL (ref 75–110)
GLUCOSE BLD-MCNC: 222 MG/DL (ref 70–99)
GLUCOSE BLD-MCNC: 240 MG/DL (ref 75–110)
GLUCOSE BLD-MCNC: 255 MG/DL (ref 75–110)
GLUCOSE BLD-MCNC: 257 MG/DL (ref 75–110)
HCT VFR BLD CALC: 43.2 % (ref 41–53)
HCT VFR BLD CALC: 44.5 % (ref 41–53)
HEMOGLOBIN: 14.2 G/DL (ref 13.5–17.5)
HEMOGLOBIN: 14.9 G/DL (ref 13.5–17.5)
IRON SATURATION: 45 % (ref 20–55)
IRON: 69 UG/DL (ref 59–158)
KAPPA FREE LIGHT CHAINS QNT: 2.61 MG/DL (ref 0.37–1.94)
LAMBDA FREE LIGHT CHAINS QNT: 2.1 MG/DL (ref 0.57–2.63)
LYMPHOCYTES # BLD: 5 %
LYMPHOCYTES # BLD: 5 %
Lab: ABNORMAL
MCH RBC QN AUTO: 29.3 PG (ref 26–34)
MCH RBC QN AUTO: 29.8 PG (ref 26–34)
MCHC RBC AUTO-ENTMCNC: 32.8 G/DL (ref 31–37)
MCHC RBC AUTO-ENTMCNC: 33.6 G/DL (ref 31–37)
MCV RBC AUTO: 87.4 FL (ref 80–100)
MCV RBC AUTO: 90.8 FL (ref 80–100)
MONOCYTES # BLD: 3 %
MONOCYTES # BLD: 6 %
ORGANISM: ABNORMAL
ORGANISM: ABNORMAL
PDW BLD-RTO: 12.8 % (ref 11.5–14.5)
PDW BLD-RTO: 14.2 % (ref 12.5–15.4)
PLATELET # BLD: 84 K/UL (ref 130–400)
PLATELET # BLD: 90 K/UL (ref 140–450)
PLATELET ESTIMATE: ABNORMAL
PLATELET ESTIMATE: ABNORMAL
PMV BLD AUTO: 11.4 FL (ref 6–12)
PMV BLD AUTO: ABNORMAL FL (ref 6–12)
POTASSIUM SERPL-SCNC: 4.1 MMOL/L (ref 3.7–5.3)
PREALBUMIN: 4.2 MG/DL (ref 20–40)
PROSTATE SPECIFIC ANTIGEN: 37.05 UG/L
RBC # BLD: 4.76 M/UL (ref 4.5–5.9)
RBC # BLD: 5.09 M/UL (ref 4.5–5.9)
RBC # BLD: ABNORMAL 10*6/UL
RBC # BLD: ABNORMAL 10*6/UL
RETIC %: 1.1 % (ref 0.5–2)
SEDIMENTATION RATE, ERYTHROCYTE: 66 MM (ref 0–15)
SEG NEUTROPHILS: 89 %
SEG NEUTROPHILS: 92 %
SEGMENTED NEUTROPHILS ABSOLUTE COUNT: 11.8 K/UL (ref 1.8–7.7)
SEGMENTED NEUTROPHILS ABSOLUTE COUNT: 13.6 K/UL (ref 1.8–7.7)
SODIUM BLD-SCNC: 134 MMOL/L (ref 135–144)
SPECIMEN DESCRIPTION: ABNORMAL
STATUS: ABNORMAL
TOTAL IRON BINDING CAPACITY: 154 UG/DL (ref 250–450)
UNSATURATED IRON BINDING CAPACITY: 85 UG/DL (ref 112–347)
VANCOMYCIN TROUGH DATE LAST DOSE: NORMAL
VANCOMYCIN TROUGH DOSE AMOUNT: NORMAL
VANCOMYCIN TROUGH TIME LAST DOSE: NORMAL
VANCOMYCIN TROUGH: 10.9 UG/ML (ref 10–20)
VITAMIN B-12: 1966 PG/ML (ref 211–946)
WBC # BLD: 13.3 K/UL (ref 3.5–11)
WBC # BLD: 14.8 K/UL (ref 3.5–11)
WBC # BLD: ABNORMAL 10*3/UL
WBC # BLD: ABNORMAL 10*3/UL

## 2017-09-21 PROCEDURE — 84153 ASSAY OF PSA TOTAL: CPT

## 2017-09-21 PROCEDURE — 86403 PARTICLE AGGLUT ANTBDY SCRN: CPT

## 2017-09-21 PROCEDURE — 36415 COLL VENOUS BLD VENIPUNCTURE: CPT

## 2017-09-21 PROCEDURE — 2500000003 HC RX 250 WO HCPCS: Performed by: ORTHOPAEDIC SURGERY

## 2017-09-21 PROCEDURE — 82746 ASSAY OF FOLIC ACID SERUM: CPT

## 2017-09-21 PROCEDURE — 85651 RBC SED RATE NONAUTOMATED: CPT

## 2017-09-21 PROCEDURE — 94760 N-INVAS EAR/PLS OXIMETRY 1: CPT

## 2017-09-21 PROCEDURE — 83550 IRON BINDING TEST: CPT

## 2017-09-21 PROCEDURE — 83540 ASSAY OF IRON: CPT

## 2017-09-21 PROCEDURE — 85025 COMPLETE CBC W/AUTO DIFF WBC: CPT

## 2017-09-21 PROCEDURE — 86140 C-REACTIVE PROTEIN: CPT

## 2017-09-21 PROCEDURE — 87075 CULTR BACTERIA EXCEPT BLOOD: CPT

## 2017-09-21 PROCEDURE — 80048 BASIC METABOLIC PNL TOTAL CA: CPT

## 2017-09-21 PROCEDURE — 6360000002 HC RX W HCPCS: Performed by: INTERNAL MEDICINE

## 2017-09-21 PROCEDURE — 84165 PROTEIN E-PHORESIS SERUM: CPT

## 2017-09-21 PROCEDURE — 72158 MRI LUMBAR SPINE W/O & W/DYE: CPT

## 2017-09-21 PROCEDURE — 87186 SC STD MICRODIL/AGAR DIL: CPT

## 2017-09-21 PROCEDURE — 87205 SMEAR GRAM STAIN: CPT

## 2017-09-21 PROCEDURE — 82947 ASSAY GLUCOSE BLOOD QUANT: CPT

## 2017-09-21 PROCEDURE — 84155 ASSAY OF PROTEIN SERUM: CPT

## 2017-09-21 PROCEDURE — 6360000004 HC RX CONTRAST MEDICATION: Performed by: INTERNAL MEDICINE

## 2017-09-21 PROCEDURE — 85045 AUTOMATED RETICULOCYTE COUNT: CPT

## 2017-09-21 PROCEDURE — 97530 THERAPEUTIC ACTIVITIES: CPT | Performed by: NURSE PRACTITIONER

## 2017-09-21 PROCEDURE — 2580000003 HC RX 258: Performed by: INTERNAL MEDICINE

## 2017-09-21 PROCEDURE — 82728 ASSAY OF FERRITIN: CPT

## 2017-09-21 PROCEDURE — 72157 MRI CHEST SPINE W/O & W/DYE: CPT

## 2017-09-21 PROCEDURE — 86334 IMMUNOFIX E-PHORESIS SERUM: CPT

## 2017-09-21 PROCEDURE — 80202 ASSAY OF VANCOMYCIN: CPT

## 2017-09-21 PROCEDURE — 2060000000 HC ICU INTERMEDIATE R&B

## 2017-09-21 PROCEDURE — A9579 GAD-BASE MR CONTRAST NOS,1ML: HCPCS | Performed by: INTERNAL MEDICINE

## 2017-09-21 PROCEDURE — 87070 CULTURE OTHR SPECIMN AEROBIC: CPT

## 2017-09-21 PROCEDURE — 6370000000 HC RX 637 (ALT 250 FOR IP): Performed by: INTERNAL MEDICINE

## 2017-09-21 PROCEDURE — 89051 BODY FLUID CELL COUNT: CPT

## 2017-09-21 PROCEDURE — 83883 ASSAY NEPHELOMETRY NOT SPEC: CPT

## 2017-09-21 PROCEDURE — 89060 EXAM SYNOVIAL FLUID CRYSTALS: CPT

## 2017-09-21 PROCEDURE — 97535 SELF CARE MNGMENT TRAINING: CPT | Performed by: NURSE PRACTITIONER

## 2017-09-21 PROCEDURE — 94640 AIRWAY INHALATION TREATMENT: CPT

## 2017-09-21 PROCEDURE — 82607 VITAMIN B-12: CPT

## 2017-09-21 RX ORDER — SODIUM CHLORIDE 0.9 % (FLUSH) 0.9 %
10 SYRINGE (ML) INJECTION 2 TIMES DAILY
Status: DISCONTINUED | OUTPATIENT
Start: 2017-09-21 | End: 2017-09-26 | Stop reason: HOSPADM

## 2017-09-21 RX ORDER — ALPRAZOLAM 0.5 MG/1
0.5 TABLET ORAL ONCE
Status: COMPLETED | OUTPATIENT
Start: 2017-09-21 | End: 2017-09-21

## 2017-09-21 RX ORDER — LIDOCAINE HYDROCHLORIDE 10 MG/ML
5 INJECTION, SOLUTION EPIDURAL; INFILTRATION; INTRACAUDAL; PERINEURAL ONCE
Status: COMPLETED | OUTPATIENT
Start: 2017-09-21 | End: 2017-09-21

## 2017-09-21 RX ADMIN — CETIRIZINE HYDROCHLORIDE 10 MG: 10 TABLET, FILM COATED ORAL at 08:02

## 2017-09-21 RX ADMIN — ALBUTEROL SULFATE 2.5 MG: 2.5 SOLUTION RESPIRATORY (INHALATION) at 14:47

## 2017-09-21 RX ADMIN — Medication 10 ML: at 07:56

## 2017-09-21 RX ADMIN — INSULIN LISPRO 2 UNITS: 100 INJECTION, SOLUTION INTRAVENOUS; SUBCUTANEOUS at 08:08

## 2017-09-21 RX ADMIN — Medication 10 ML: at 13:14

## 2017-09-21 RX ADMIN — METHYLPREDNISOLONE SODIUM SUCCINATE 40 MG: 40 INJECTION, POWDER, FOR SOLUTION INTRAMUSCULAR; INTRAVENOUS at 17:20

## 2017-09-21 RX ADMIN — Medication 0.5 MG: at 17:33

## 2017-09-21 RX ADMIN — Medication 0.5 MG: at 11:49

## 2017-09-21 RX ADMIN — CEFAZOLIN SODIUM 1 G: 1 INJECTION, SOLUTION INTRAVENOUS at 17:19

## 2017-09-21 RX ADMIN — Medication 400 MG: at 08:02

## 2017-09-21 RX ADMIN — ALBUTEROL SULFATE 2.5 MG: 2.5 SOLUTION RESPIRATORY (INHALATION) at 07:23

## 2017-09-21 RX ADMIN — ALPRAZOLAM 0.5 MG: 0.5 TABLET ORAL at 11:22

## 2017-09-21 RX ADMIN — INSULIN LISPRO 2 UNITS: 100 INJECTION, SOLUTION INTRAVENOUS; SUBCUTANEOUS at 17:20

## 2017-09-21 RX ADMIN — ACETAMINOPHEN 650 MG: 325 TABLET ORAL at 13:31

## 2017-09-21 RX ADMIN — METHYLPREDNISOLONE SODIUM SUCCINATE 40 MG: 40 INJECTION, POWDER, FOR SOLUTION INTRAMUSCULAR; INTRAVENOUS at 07:55

## 2017-09-21 RX ADMIN — CLOTRIMAZOLE AND BETAMETHASONE DIPROPIONATE: 10; .5 CREAM TOPICAL at 20:39

## 2017-09-21 RX ADMIN — ALBUTEROL SULFATE 2.5 MG: 2.5 SOLUTION RESPIRATORY (INHALATION) at 11:06

## 2017-09-21 RX ADMIN — INSULIN LISPRO 2 UNITS: 100 INJECTION, SOLUTION INTRAVENOUS; SUBCUTANEOUS at 21:43

## 2017-09-21 RX ADMIN — LIDOCAINE HYDROCHLORIDE 5 ML: 10 INJECTION, SOLUTION EPIDURAL; INFILTRATION; INTRACAUDAL; PERINEURAL at 16:00

## 2017-09-21 RX ADMIN — GADOPENTETATE DIMEGLUMINE 20 ML: 469.01 INJECTION INTRAVENOUS at 13:13

## 2017-09-21 RX ADMIN — Medication 0.5 MG: at 21:43

## 2017-09-21 RX ADMIN — ATORVASTATIN CALCIUM 20 MG: 20 TABLET, FILM COATED ORAL at 08:01

## 2017-09-21 RX ADMIN — ACETAMINOPHEN 650 MG: 325 TABLET ORAL at 07:54

## 2017-09-21 RX ADMIN — ALBUTEROL SULFATE 2.5 MG: 2.5 SOLUTION RESPIRATORY (INHALATION) at 21:00

## 2017-09-21 RX ADMIN — VANCOMYCIN HYDROCHLORIDE 1750 MG: 500 INJECTION, POWDER, LYOPHILIZED, FOR SOLUTION INTRAVENOUS at 03:50

## 2017-09-21 RX ADMIN — DOXAZOSIN 4 MG: 4 TABLET ORAL at 20:39

## 2017-09-21 RX ADMIN — CLOTRIMAZOLE AND BETAMETHASONE DIPROPIONATE: 10; .5 CREAM TOPICAL at 07:58

## 2017-09-21 ASSESSMENT — PAIN DESCRIPTION - ORIENTATION: ORIENTATION: MID;RIGHT

## 2017-09-21 ASSESSMENT — PAIN SCALES - GENERAL
PAINLEVEL_OUTOF10: 4
PAINLEVEL_OUTOF10: 6
PAINLEVEL_OUTOF10: 7
PAINLEVEL_OUTOF10: 5
PAINLEVEL_OUTOF10: 5
PAINLEVEL_OUTOF10: 3
PAINLEVEL_OUTOF10: 5
PAINLEVEL_OUTOF10: 7
PAINLEVEL_OUTOF10: 8
PAINLEVEL_OUTOF10: 5

## 2017-09-21 ASSESSMENT — PAIN DESCRIPTION - PAIN TYPE
TYPE: CHRONIC PAIN
TYPE: CHRONIC PAIN
TYPE: ACUTE PAIN

## 2017-09-21 ASSESSMENT — PAIN DESCRIPTION - LOCATION
LOCATION: BACK

## 2017-09-22 ENCOUNTER — APPOINTMENT (OUTPATIENT)
Dept: GENERAL RADIOLOGY | Age: 77
DRG: 853 | End: 2017-09-22
Payer: MEDICARE

## 2017-09-22 ENCOUNTER — ANESTHESIA EVENT (OUTPATIENT)
Dept: OPERATING ROOM | Age: 77
DRG: 853 | End: 2017-09-22
Payer: MEDICARE

## 2017-09-22 ENCOUNTER — ANESTHESIA (OUTPATIENT)
Dept: OPERATING ROOM | Age: 77
DRG: 853 | End: 2017-09-22
Payer: MEDICARE

## 2017-09-22 VITALS — DIASTOLIC BLOOD PRESSURE: 61 MMHG | OXYGEN SATURATION: 94 % | TEMPERATURE: 97 F | SYSTOLIC BLOOD PRESSURE: 118 MMHG

## 2017-09-22 PROBLEM — M00.062 STAPHYLOCOCCAL ARTHRITIS OF LEFT KNEE (HCC): Status: ACTIVE | Noted: 2017-09-22

## 2017-09-22 LAB
ABSOLUTE EOS #: 0 K/UL (ref 0–0.4)
ABSOLUTE LYMPH #: 1.45 K/UL (ref 1–4.8)
ABSOLUTE MONO #: 0.44 K/UL (ref 0.2–0.8)
ALBUMIN (CALCULATED): 2.6 G/DL (ref 3.2–5.2)
ALBUMIN PERCENT: 47 % (ref 45–65)
ALPHA 1 PERCENT: 8 % (ref 3–6)
ALPHA 2 PERCENT: 18 % (ref 6–13)
ALPHA-1-GLOBULIN: 0.4 G/DL (ref 0.1–0.4)
ALPHA-2-GLOBULIN: 1 G/DL (ref 0.5–0.9)
ANION GAP SERPL CALCULATED.3IONS-SCNC: 14 MMOL/L (ref 9–17)
APPEARANCE FLUID: NORMAL
BASO FLUID: NORMAL %
BASOPHILS # BLD: 0 %
BASOPHILS ABSOLUTE: 0 K/UL (ref 0–0.2)
BETA GLOBULIN: 0.7 G/DL (ref 0.5–1.1)
BETA PERCENT: 12 % (ref 11–19)
BUN BLDV-MCNC: 31 MG/DL (ref 8–23)
BUN/CREAT BLD: 46 (ref 9–20)
CALCIUM SERPL-MCNC: 8.4 MG/DL (ref 8.6–10.4)
CHLORIDE BLD-SCNC: 97 MMOL/L (ref 98–107)
CO2: 26 MMOL/L (ref 20–31)
COLOR FLUID: NORMAL
CREAT SERPL-MCNC: 0.67 MG/DL (ref 0.7–1.2)
CRYSTALS, FLUID: POSITIVE
CULTURE: ABNORMAL
DIFFERENTIAL TYPE: ABNORMAL
EOSINOPHIL FLUID: NORMAL %
EOSINOPHILS RELATIVE PERCENT: 0 %
FLUID DIFF COMMENT: NORMAL
GAMMA GLOBULIN %: 16 % (ref 9–20)
GAMMA GLOBULIN: 0.9 G/DL (ref 0.5–1.5)
GFR AFRICAN AMERICAN: >60 ML/MIN
GFR NON-AFRICAN AMERICAN: >60 ML/MIN
GFR SERPL CREATININE-BSD FRML MDRD: ABNORMAL ML/MIN/{1.73_M2}
GFR SERPL CREATININE-BSD FRML MDRD: ABNORMAL ML/MIN/{1.73_M2}
GLUCOSE BLD-MCNC: 193 MG/DL (ref 70–99)
GLUCOSE BLD-MCNC: 197 MG/DL (ref 75–110)
GLUCOSE BLD-MCNC: 199 MG/DL (ref 75–110)
GLUCOSE BLD-MCNC: 220 MG/DL (ref 75–110)
GLUCOSE BLD-MCNC: 232 MG/DL (ref 75–110)
HCT VFR BLD CALC: 45.9 % (ref 41–53)
HEMOGLOBIN: 15.6 G/DL (ref 13.5–17.5)
LYMPHOCYTES # BLD: 10 %
LYMPHOCYTES, BODY FLUID: 8 %
Lab: ABNORMAL
Lab: ABNORMAL
MCH RBC QN AUTO: 30.3 PG (ref 26–34)
MCHC RBC AUTO-ENTMCNC: 33.9 G/DL (ref 31–37)
MCV RBC AUTO: 89.2 FL (ref 80–100)
MONOCYTE, FLUID: NORMAL %
MONOCYTES # BLD: 3 %
MORPHOLOGY: ABNORMAL
NEUTROPHIL, FLUID: 83 %
OTHER CELLS FLUID: NORMAL %
PATHOLOGIST REVIEW: NORMAL
PATHOLOGIST: ABNORMAL
PATHOLOGIST: NORMAL
PDW BLD-RTO: 13.7 % (ref 11.5–14.5)
PLATELET # BLD: 122 K/UL (ref 130–400)
PLATELET ESTIMATE: ABNORMAL
PMV BLD AUTO: 10.8 FL (ref 6–12)
POTASSIUM SERPL-SCNC: 4.2 MMOL/L (ref 3.7–5.3)
PREALBUMIN: 9.8 MG/DL (ref 20–40)
PROTEIN ELECTROPHORESIS, SERUM: ABNORMAL
RBC # BLD: 5.15 M/UL (ref 4.5–5.9)
RBC # BLD: ABNORMAL 10*6/UL
RBC FLUID: 525 /MM3
SEG NEUTROPHILS: 87 %
SEGMENTED NEUTROPHILS ABSOLUTE COUNT: 12.61 K/UL (ref 1.8–7.7)
SERUM IFX INTERP: NORMAL
SODIUM BLD-SCNC: 137 MMOL/L (ref 135–144)
SPECIMEN DESCRIPTION: ABNORMAL
SPECIMEN DESCRIPTION: ABNORMAL
SPECIMEN TYPE: ABNORMAL
SPECIMEN TYPE: NORMAL
STATUS: ABNORMAL
SURGICAL PATHOLOGY REPORT: NORMAL
TOTAL PROT. SUM,%: 101 % (ref 98–102)
TOTAL PROT. SUM: 5.6 G/DL (ref 6.3–8.2)
TOTAL PROTEIN: 5.6 G/DL (ref 6.4–8.3)
WBC # BLD: 14.5 K/UL (ref 3.5–11)
WBC # BLD: ABNORMAL 10*3/UL
WBC FLUID: NORMAL /MM3

## 2017-09-22 PROCEDURE — 2580000003 HC RX 258: Performed by: INTERNAL MEDICINE

## 2017-09-22 PROCEDURE — 2500000003 HC RX 250 WO HCPCS: Performed by: NURSE ANESTHETIST, CERTIFIED REGISTERED

## 2017-09-22 PROCEDURE — 3600000013 HC SURGERY LEVEL 3 ADDTL 15MIN: Performed by: ORTHOPAEDIC SURGERY

## 2017-09-22 PROCEDURE — 3700000001 HC ADD 15 MINUTES (ANESTHESIA): Performed by: ORTHOPAEDIC SURGERY

## 2017-09-22 PROCEDURE — 73562 X-RAY EXAM OF KNEE 3: CPT

## 2017-09-22 PROCEDURE — 2580000003 HC RX 258: Performed by: ORTHOPAEDIC SURGERY

## 2017-09-22 PROCEDURE — 6360000002 HC RX W HCPCS: Performed by: INTERNAL MEDICINE

## 2017-09-22 PROCEDURE — 82947 ASSAY GLUCOSE BLOOD QUANT: CPT

## 2017-09-22 PROCEDURE — 6370000000 HC RX 637 (ALT 250 FOR IP): Performed by: INTERNAL MEDICINE

## 2017-09-22 PROCEDURE — 0S9D40Z DRAINAGE OF LEFT KNEE JOINT WITH DRAINAGE DEVICE, PERCUTANEOUS ENDOSCOPIC APPROACH: ICD-10-PCS | Performed by: ORTHOPAEDIC SURGERY

## 2017-09-22 PROCEDURE — 99222 1ST HOSP IP/OBS MODERATE 55: CPT | Performed by: INTERNAL MEDICINE

## 2017-09-22 PROCEDURE — 3700000000 HC ANESTHESIA ATTENDED CARE: Performed by: ORTHOPAEDIC SURGERY

## 2017-09-22 PROCEDURE — C1729 CATH, DRAINAGE: HCPCS | Performed by: ORTHOPAEDIC SURGERY

## 2017-09-22 PROCEDURE — 7100000000 HC PACU RECOVERY - FIRST 15 MIN: Performed by: ORTHOPAEDIC SURGERY

## 2017-09-22 PROCEDURE — 2580000003 HC RX 258: Performed by: NURSE ANESTHETIST, CERTIFIED REGISTERED

## 2017-09-22 PROCEDURE — 36415 COLL VENOUS BLD VENIPUNCTURE: CPT

## 2017-09-22 PROCEDURE — 2060000000 HC ICU INTERMEDIATE R&B

## 2017-09-22 PROCEDURE — 0SBD4ZZ EXCISION OF LEFT KNEE JOINT, PERCUTANEOUS ENDOSCOPIC APPROACH: ICD-10-PCS | Performed by: ORTHOPAEDIC SURGERY

## 2017-09-22 PROCEDURE — 3600000003 HC SURGERY LEVEL 3 BASE: Performed by: ORTHOPAEDIC SURGERY

## 2017-09-22 PROCEDURE — 6370000000 HC RX 637 (ALT 250 FOR IP): Performed by: ORTHOPAEDIC SURGERY

## 2017-09-22 PROCEDURE — 2500000003 HC RX 250 WO HCPCS: Performed by: ORTHOPAEDIC SURGERY

## 2017-09-22 PROCEDURE — 97116 GAIT TRAINING THERAPY: CPT

## 2017-09-22 PROCEDURE — 80048 BASIC METABOLIC PNL TOTAL CA: CPT

## 2017-09-22 PROCEDURE — 6360000002 HC RX W HCPCS: Performed by: NURSE ANESTHETIST, CERTIFIED REGISTERED

## 2017-09-22 PROCEDURE — 7100000001 HC PACU RECOVERY - ADDTL 15 MIN: Performed by: ORTHOPAEDIC SURGERY

## 2017-09-22 PROCEDURE — 84134 ASSAY OF PREALBUMIN: CPT

## 2017-09-22 PROCEDURE — 97530 THERAPEUTIC ACTIVITIES: CPT

## 2017-09-22 PROCEDURE — 94640 AIRWAY INHALATION TREATMENT: CPT

## 2017-09-22 PROCEDURE — 2720000010 HC SURG SUPPLY STERILE: Performed by: ORTHOPAEDIC SURGERY

## 2017-09-22 PROCEDURE — 85025 COMPLETE CBC W/AUTO DIFF WBC: CPT

## 2017-09-22 PROCEDURE — 73521 X-RAY EXAM HIPS BI 2 VIEWS: CPT

## 2017-09-22 PROCEDURE — 97110 THERAPEUTIC EXERCISES: CPT

## 2017-09-22 RX ORDER — ONDANSETRON 2 MG/ML
INJECTION INTRAMUSCULAR; INTRAVENOUS PRN
Status: DISCONTINUED | OUTPATIENT
Start: 2017-09-22 | End: 2017-09-22 | Stop reason: SDUPTHER

## 2017-09-22 RX ORDER — FENTANYL CITRATE 50 UG/ML
INJECTION, SOLUTION INTRAMUSCULAR; INTRAVENOUS PRN
Status: DISCONTINUED | OUTPATIENT
Start: 2017-09-22 | End: 2017-09-22 | Stop reason: SDUPTHER

## 2017-09-22 RX ORDER — SODIUM CHLORIDE 0.9 % (FLUSH) 0.9 %
10 SYRINGE (ML) INJECTION PRN
Status: DISCONTINUED | OUTPATIENT
Start: 2017-09-22 | End: 2017-09-22 | Stop reason: SDUPTHER

## 2017-09-22 RX ORDER — SODIUM CHLORIDE, SODIUM LACTATE, POTASSIUM CHLORIDE, CALCIUM CHLORIDE 600; 310; 30; 20 MG/100ML; MG/100ML; MG/100ML; MG/100ML
INJECTION, SOLUTION INTRAVENOUS CONTINUOUS PRN
Status: DISCONTINUED | OUTPATIENT
Start: 2017-09-22 | End: 2017-09-22 | Stop reason: SDUPTHER

## 2017-09-22 RX ORDER — ACETAMINOPHEN 325 MG/1
650 TABLET ORAL EVERY 4 HOURS PRN
Status: DISCONTINUED | OUTPATIENT
Start: 2017-09-22 | End: 2017-09-26 | Stop reason: HOSPADM

## 2017-09-22 RX ORDER — OXYCODONE HYDROCHLORIDE 5 MG/1
5 TABLET ORAL EVERY 4 HOURS PRN
Status: DISCONTINUED | OUTPATIENT
Start: 2017-09-22 | End: 2017-09-26 | Stop reason: HOSPADM

## 2017-09-22 RX ORDER — SODIUM CHLORIDE, SODIUM LACTATE, POTASSIUM CHLORIDE, CALCIUM CHLORIDE 600; 310; 30; 20 MG/100ML; MG/100ML; MG/100ML; MG/100ML
INJECTION, SOLUTION INTRAVENOUS CONTINUOUS
Status: DISCONTINUED | OUTPATIENT
Start: 2017-09-22 | End: 2017-09-26 | Stop reason: HOSPADM

## 2017-09-22 RX ORDER — ASPIRIN 81 MG/1
81 TABLET ORAL DAILY
Status: DISCONTINUED | OUTPATIENT
Start: 2017-09-23 | End: 2017-09-26 | Stop reason: HOSPADM

## 2017-09-22 RX ORDER — ALBUTEROL SULFATE 2.5 MG/3ML
2.5 SOLUTION RESPIRATORY (INHALATION) 3 TIMES DAILY
Status: DISCONTINUED | OUTPATIENT
Start: 2017-09-22 | End: 2017-09-24

## 2017-09-22 RX ORDER — LANOLIN ALCOHOL/MO/W.PET/CERES
1000 CREAM (GRAM) TOPICAL DAILY
Status: DISCONTINUED | OUTPATIENT
Start: 2017-09-22 | End: 2017-09-26 | Stop reason: HOSPADM

## 2017-09-22 RX ORDER — SODIUM CHLORIDE 0.9 % (FLUSH) 0.9 %
10 SYRINGE (ML) INJECTION EVERY 12 HOURS SCHEDULED
Status: DISCONTINUED | OUTPATIENT
Start: 2017-09-22 | End: 2017-09-22 | Stop reason: SDUPTHER

## 2017-09-22 RX ORDER — FENTANYL CITRATE 50 UG/ML
25 INJECTION, SOLUTION INTRAMUSCULAR; INTRAVENOUS EVERY 5 MIN PRN
Status: DISCONTINUED | OUTPATIENT
Start: 2017-09-22 | End: 2017-09-22 | Stop reason: HOSPADM

## 2017-09-22 RX ORDER — OXYCODONE HYDROCHLORIDE 5 MG/1
10 TABLET ORAL EVERY 4 HOURS PRN
Status: DISCONTINUED | OUTPATIENT
Start: 2017-09-22 | End: 2017-09-26 | Stop reason: HOSPADM

## 2017-09-22 RX ORDER — DOCUSATE SODIUM 100 MG/1
100 CAPSULE, LIQUID FILLED ORAL 2 TIMES DAILY
Status: DISCONTINUED | OUTPATIENT
Start: 2017-09-22 | End: 2017-09-26 | Stop reason: HOSPADM

## 2017-09-22 RX ORDER — ONDANSETRON 2 MG/ML
4 INJECTION INTRAMUSCULAR; INTRAVENOUS EVERY 6 HOURS PRN
Status: DISCONTINUED | OUTPATIENT
Start: 2017-09-22 | End: 2017-09-26 | Stop reason: HOSPADM

## 2017-09-22 RX ORDER — AMOXICILLIN 500 MG
1 CAPSULE ORAL DAILY
Status: DISCONTINUED | OUTPATIENT
Start: 2017-09-22 | End: 2017-09-22 | Stop reason: RX

## 2017-09-22 RX ORDER — BUPIVACAINE HYDROCHLORIDE AND EPINEPHRINE 5; 5 MG/ML; UG/ML
INJECTION, SOLUTION EPIDURAL; INTRACAUDAL; PERINEURAL PRN
Status: DISCONTINUED | OUTPATIENT
Start: 2017-09-22 | End: 2017-09-22 | Stop reason: HOSPADM

## 2017-09-22 RX ORDER — FENTANYL CITRATE 50 UG/ML
50 INJECTION, SOLUTION INTRAMUSCULAR; INTRAVENOUS EVERY 5 MIN PRN
Status: DISCONTINUED | OUTPATIENT
Start: 2017-09-22 | End: 2017-09-22 | Stop reason: HOSPADM

## 2017-09-22 RX ORDER — PROPOFOL 10 MG/ML
INJECTION, EMULSION INTRAVENOUS PRN
Status: DISCONTINUED | OUTPATIENT
Start: 2017-09-22 | End: 2017-09-22 | Stop reason: SDUPTHER

## 2017-09-22 RX ORDER — AMPICILLIN TRIHYDRATE 250 MG
500 CAPSULE ORAL 2 TIMES DAILY
Status: DISCONTINUED | OUTPATIENT
Start: 2017-09-22 | End: 2017-09-22 | Stop reason: RX

## 2017-09-22 RX ORDER — ONDANSETRON 2 MG/ML
4 INJECTION INTRAMUSCULAR; INTRAVENOUS
Status: DISCONTINUED | OUTPATIENT
Start: 2017-09-22 | End: 2017-09-22 | Stop reason: HOSPADM

## 2017-09-22 RX ORDER — LIDOCAINE HYDROCHLORIDE 20 MG/ML
INJECTION, SOLUTION EPIDURAL; INFILTRATION; INTRACAUDAL; PERINEURAL PRN
Status: DISCONTINUED | OUTPATIENT
Start: 2017-09-22 | End: 2017-09-22 | Stop reason: SDUPTHER

## 2017-09-22 RX ADMIN — CYANOCOBALAMIN TAB 1000 MCG 1000 MCG: 1000 TAB at 21:31

## 2017-09-22 RX ADMIN — DOCUSATE SODIUM 100 MG: 100 CAPSULE, LIQUID FILLED ORAL at 21:31

## 2017-09-22 RX ADMIN — ALBUTEROL SULFATE 2.5 MG: 2.5 SOLUTION RESPIRATORY (INHALATION) at 20:28

## 2017-09-22 RX ADMIN — INSULIN LISPRO 1 UNITS: 100 INJECTION, SOLUTION INTRAVENOUS; SUBCUTANEOUS at 09:25

## 2017-09-22 RX ADMIN — FENTANYL CITRATE 50 MCG: 50 INJECTION INTRAMUSCULAR; INTRAVENOUS at 15:21

## 2017-09-22 RX ADMIN — INSULIN LISPRO 2 UNITS: 100 INJECTION, SOLUTION INTRAVENOUS; SUBCUTANEOUS at 13:32

## 2017-09-22 RX ADMIN — PROPOFOL 100 MG: 10 INJECTION, EMULSION INTRAVENOUS at 15:27

## 2017-09-22 RX ADMIN — Medication 0.5 MG: at 19:44

## 2017-09-22 RX ADMIN — FENTANYL CITRATE 50 MCG: 50 INJECTION INTRAMUSCULAR; INTRAVENOUS at 15:27

## 2017-09-22 RX ADMIN — SODIUM CHLORIDE, POTASSIUM CHLORIDE, SODIUM LACTATE AND CALCIUM CHLORIDE: 600; 310; 30; 20 INJECTION, SOLUTION INTRAVENOUS at 16:10

## 2017-09-22 RX ADMIN — Medication 10 ML: at 09:24

## 2017-09-22 RX ADMIN — ATORVASTATIN CALCIUM 20 MG: 20 TABLET, FILM COATED ORAL at 09:23

## 2017-09-22 RX ADMIN — SODIUM CHLORIDE, POTASSIUM CHLORIDE, SODIUM LACTATE AND CALCIUM CHLORIDE: 600; 310; 30; 20 INJECTION, SOLUTION INTRAVENOUS at 15:01

## 2017-09-22 RX ADMIN — ALBUTEROL SULFATE 2.5 MG: 2.5 SOLUTION RESPIRATORY (INHALATION) at 11:15

## 2017-09-22 RX ADMIN — METHYLPREDNISOLONE SODIUM SUCCINATE 40 MG: 40 INJECTION, POWDER, FOR SOLUTION INTRAMUSCULAR; INTRAVENOUS at 06:15

## 2017-09-22 RX ADMIN — ALBUTEROL SULFATE 2.5 MG: 2.5 SOLUTION RESPIRATORY (INHALATION) at 07:26

## 2017-09-22 RX ADMIN — SODIUM CHLORIDE, POTASSIUM CHLORIDE, SODIUM LACTATE AND CALCIUM CHLORIDE: 600; 310; 30; 20 INJECTION, SOLUTION INTRAVENOUS at 20:13

## 2017-09-22 RX ADMIN — FENTANYL CITRATE 100 MCG: 50 INJECTION INTRAMUSCULAR; INTRAVENOUS at 15:53

## 2017-09-22 RX ADMIN — Medication 0.5 MG: at 09:22

## 2017-09-22 RX ADMIN — CLOTRIMAZOLE AND BETAMETHASONE DIPROPIONATE: 10; .5 CREAM TOPICAL at 09:24

## 2017-09-22 RX ADMIN — Medication 10 ML: at 02:09

## 2017-09-22 RX ADMIN — ONDANSETRON 4 MG: 2 INJECTION INTRAMUSCULAR; INTRAVENOUS at 15:21

## 2017-09-22 RX ADMIN — CEFAZOLIN SODIUM 1 G: 1 INJECTION, SOLUTION INTRAVENOUS at 02:09

## 2017-09-22 RX ADMIN — DOXAZOSIN 4 MG: 4 TABLET ORAL at 21:32

## 2017-09-22 RX ADMIN — Medication 400 MG: at 09:23

## 2017-09-22 RX ADMIN — CEFAZOLIN SODIUM 2 G: 1 INJECTION, SOLUTION INTRAVENOUS at 15:39

## 2017-09-22 RX ADMIN — INSULIN LISPRO 1 UNITS: 100 INJECTION, SOLUTION INTRAVENOUS; SUBCUTANEOUS at 22:14

## 2017-09-22 RX ADMIN — LIDOCAINE HYDROCHLORIDE 100 MG: 20 INJECTION, SOLUTION EPIDURAL; INFILTRATION; INTRACAUDAL; PERINEURAL at 15:27

## 2017-09-22 RX ADMIN — CETIRIZINE HYDROCHLORIDE 10 MG: 10 TABLET, FILM COATED ORAL at 09:24

## 2017-09-22 RX ADMIN — PROPOFOL 30 MG: 10 INJECTION, EMULSION INTRAVENOUS at 16:08

## 2017-09-22 RX ADMIN — CEFAZOLIN SODIUM 1 G: 1 INJECTION, SOLUTION INTRAVENOUS at 09:23

## 2017-09-22 RX ADMIN — CLOTRIMAZOLE AND BETAMETHASONE DIPROPIONATE: 10; .5 CREAM TOPICAL at 21:31

## 2017-09-22 ASSESSMENT — PAIN SCALES - GENERAL
PAINLEVEL_OUTOF10: 5
PAINLEVEL_OUTOF10: 3
PAINLEVEL_OUTOF10: 0
PAINLEVEL_OUTOF10: 8
PAINLEVEL_OUTOF10: 5
PAINLEVEL_OUTOF10: 4
PAINLEVEL_OUTOF10: 5
PAINLEVEL_OUTOF10: 0

## 2017-09-22 ASSESSMENT — PAIN DESCRIPTION - PAIN TYPE
TYPE: ACUTE PAIN
TYPE: SURGICAL PAIN
TYPE: ACUTE PAIN
TYPE: SURGICAL PAIN

## 2017-09-22 ASSESSMENT — PAIN DESCRIPTION - ORIENTATION: ORIENTATION: LOWER

## 2017-09-22 ASSESSMENT — PAIN DESCRIPTION - LOCATION: LOCATION: BACK

## 2017-09-23 PROBLEM — I48.91 ATRIAL FIBRILLATION (HCC): Status: ACTIVE | Noted: 2017-09-23

## 2017-09-23 PROBLEM — N30.00 ACUTE CYSTITIS WITHOUT HEMATURIA: Status: ACTIVE | Noted: 2017-09-23

## 2017-09-23 PROBLEM — E16.2 HYPOGLYCEMIA: Status: ACTIVE | Noted: 2017-09-23

## 2017-09-23 PROBLEM — D69.6 THROMBOCYTOPENIA (HCC): Status: ACTIVE | Noted: 2017-09-23

## 2017-09-23 LAB
ABSOLUTE EOS #: 0 K/UL (ref 0–0.4)
ABSOLUTE LYMPH #: 0.8 K/UL (ref 1–4.8)
ABSOLUTE MONO #: 0.7 K/UL (ref 0.2–0.8)
ANION GAP SERPL CALCULATED.3IONS-SCNC: 10 MMOL/L (ref 9–17)
BASOPHILS # BLD: 0 %
BASOPHILS ABSOLUTE: 0 K/UL (ref 0–0.2)
BUN BLDV-MCNC: 29 MG/DL (ref 8–23)
BUN/CREAT BLD: 40 (ref 9–20)
CALCIUM SERPL-MCNC: 8.2 MG/DL (ref 8.6–10.4)
CHLORIDE BLD-SCNC: 102 MMOL/L (ref 98–107)
CO2: 28 MMOL/L (ref 20–31)
CREAT SERPL-MCNC: 0.72 MG/DL (ref 0.7–1.2)
CULTURE: ABNORMAL
DIFFERENTIAL TYPE: ABNORMAL
EOSINOPHILS RELATIVE PERCENT: 0 %
GFR AFRICAN AMERICAN: >60 ML/MIN
GFR NON-AFRICAN AMERICAN: >60 ML/MIN
GFR SERPL CREATININE-BSD FRML MDRD: ABNORMAL ML/MIN/{1.73_M2}
GFR SERPL CREATININE-BSD FRML MDRD: ABNORMAL ML/MIN/{1.73_M2}
GLUCOSE BLD-MCNC: 157 MG/DL (ref 75–110)
GLUCOSE BLD-MCNC: 169 MG/DL (ref 70–99)
GLUCOSE BLD-MCNC: 210 MG/DL (ref 75–110)
GLUCOSE BLD-MCNC: 254 MG/DL (ref 75–110)
GLUCOSE BLD-MCNC: 353 MG/DL (ref 75–110)
HCT VFR BLD CALC: 42.6 % (ref 41–53)
HEMOGLOBIN: 14 G/DL (ref 13.5–17.5)
LYMPHOCYTES # BLD: 7 %
Lab: ABNORMAL
Lab: ABNORMAL
MCH RBC QN AUTO: 29.6 PG (ref 26–34)
MCHC RBC AUTO-ENTMCNC: 32.9 G/DL (ref 31–37)
MCV RBC AUTO: 90.1 FL (ref 80–100)
MONOCYTES # BLD: 6 %
PDW BLD-RTO: 13.8 % (ref 11.5–14.5)
PLATELET # BLD: 165 K/UL (ref 130–400)
PLATELET ESTIMATE: ABNORMAL
PMV BLD AUTO: 9.5 FL (ref 6–12)
POTASSIUM SERPL-SCNC: 4.6 MMOL/L (ref 3.7–5.3)
PREALBUMIN: 12.9 MG/DL (ref 20–40)
RBC # BLD: 4.73 M/UL (ref 4.5–5.9)
RBC # BLD: ABNORMAL 10*6/UL
SEG NEUTROPHILS: 87 %
SEGMENTED NEUTROPHILS ABSOLUTE COUNT: 10.7 K/UL (ref 1.8–7.7)
SODIUM BLD-SCNC: 140 MMOL/L (ref 135–144)
SPECIMEN DESCRIPTION: ABNORMAL
SPECIMEN DESCRIPTION: ABNORMAL
STATUS: ABNORMAL
WBC # BLD: 12.3 K/UL (ref 3.5–11)
WBC # BLD: ABNORMAL 10*3/UL

## 2017-09-23 PROCEDURE — 6370000000 HC RX 637 (ALT 250 FOR IP): Performed by: ORTHOPAEDIC SURGERY

## 2017-09-23 PROCEDURE — 87070 CULTURE OTHR SPECIMN AEROBIC: CPT

## 2017-09-23 PROCEDURE — 80048 BASIC METABOLIC PNL TOTAL CA: CPT

## 2017-09-23 PROCEDURE — 84134 ASSAY OF PREALBUMIN: CPT

## 2017-09-23 PROCEDURE — 36415 COLL VENOUS BLD VENIPUNCTURE: CPT

## 2017-09-23 PROCEDURE — 97116 GAIT TRAINING THERAPY: CPT

## 2017-09-23 PROCEDURE — 97168 OT RE-EVAL EST PLAN CARE: CPT

## 2017-09-23 PROCEDURE — 2700000000 HC OXYGEN THERAPY PER DAY

## 2017-09-23 PROCEDURE — 2060000000 HC ICU INTERMEDIATE R&B

## 2017-09-23 PROCEDURE — 82947 ASSAY GLUCOSE BLOOD QUANT: CPT

## 2017-09-23 PROCEDURE — G8978 MOBILITY CURRENT STATUS: HCPCS

## 2017-09-23 PROCEDURE — 6370000000 HC RX 637 (ALT 250 FOR IP): Performed by: INTERNAL MEDICINE

## 2017-09-23 PROCEDURE — 6360000002 HC RX W HCPCS: Performed by: INTERNAL MEDICINE

## 2017-09-23 PROCEDURE — 97164 PT RE-EVAL EST PLAN CARE: CPT

## 2017-09-23 PROCEDURE — 2580000003 HC RX 258: Performed by: ORTHOPAEDIC SURGERY

## 2017-09-23 PROCEDURE — 94640 AIRWAY INHALATION TREATMENT: CPT

## 2017-09-23 PROCEDURE — G8979 MOBILITY GOAL STATUS: HCPCS

## 2017-09-23 PROCEDURE — 6370000000 HC RX 637 (ALT 250 FOR IP): Performed by: NURSE PRACTITIONER

## 2017-09-23 PROCEDURE — 87040 BLOOD CULTURE FOR BACTERIA: CPT

## 2017-09-23 PROCEDURE — 97530 THERAPEUTIC ACTIVITIES: CPT

## 2017-09-23 PROCEDURE — 87205 SMEAR GRAM STAIN: CPT

## 2017-09-23 PROCEDURE — 85025 COMPLETE CBC W/AUTO DIFF WBC: CPT

## 2017-09-23 PROCEDURE — 94760 N-INVAS EAR/PLS OXIMETRY 1: CPT

## 2017-09-23 PROCEDURE — 89220 SPUTUM SPECIMEN COLLECTION: CPT

## 2017-09-23 RX ORDER — PANTOPRAZOLE SODIUM 40 MG/1
40 TABLET, DELAYED RELEASE ORAL
Status: DISCONTINUED | OUTPATIENT
Start: 2017-09-23 | End: 2017-09-26 | Stop reason: HOSPADM

## 2017-09-23 RX ORDER — PREDNISONE 20 MG/1
40 TABLET ORAL DAILY
Status: DISCONTINUED | OUTPATIENT
Start: 2017-09-23 | End: 2017-09-26 | Stop reason: HOSPADM

## 2017-09-23 RX ORDER — CALCIUM CARBONATE 200(500)MG
500 TABLET,CHEWABLE ORAL EVERY 4 HOURS PRN
Status: DISCONTINUED | OUTPATIENT
Start: 2017-09-23 | End: 2017-09-26 | Stop reason: HOSPADM

## 2017-09-23 RX ADMIN — PREDNISONE 40 MG: 20 TABLET ORAL at 14:58

## 2017-09-23 RX ADMIN — ALBUTEROL SULFATE 2.5 MG: 2.5 SOLUTION RESPIRATORY (INHALATION) at 09:27

## 2017-09-23 RX ADMIN — ALBUTEROL SULFATE 2.5 MG: 2.5 SOLUTION RESPIRATORY (INHALATION) at 19:34

## 2017-09-23 RX ADMIN — DOCUSATE SODIUM 100 MG: 100 CAPSULE, LIQUID FILLED ORAL at 09:08

## 2017-09-23 RX ADMIN — OXYCODONE HYDROCHLORIDE 10 MG: 5 TABLET ORAL at 10:35

## 2017-09-23 RX ADMIN — INSULIN LISPRO 5 UNITS: 100 INJECTION, SOLUTION INTRAVENOUS; SUBCUTANEOUS at 17:10

## 2017-09-23 RX ADMIN — APIXABAN 5 MG: 5 TABLET, FILM COATED ORAL at 13:01

## 2017-09-23 RX ADMIN — CETIRIZINE HYDROCHLORIDE 10 MG: 10 TABLET, FILM COATED ORAL at 09:08

## 2017-09-23 RX ADMIN — CLOTRIMAZOLE AND BETAMETHASONE DIPROPIONATE: 10; .5 CREAM TOPICAL at 20:54

## 2017-09-23 RX ADMIN — DOCUSATE SODIUM 100 MG: 100 CAPSULE, LIQUID FILLED ORAL at 20:49

## 2017-09-23 RX ADMIN — OXYCODONE HYDROCHLORIDE 5 MG: 5 TABLET ORAL at 04:18

## 2017-09-23 RX ADMIN — ALBUTEROL SULFATE 2.5 MG: 2.5 SOLUTION RESPIRATORY (INHALATION) at 14:31

## 2017-09-23 RX ADMIN — ASPIRIN 81 MG: 81 TABLET, COATED ORAL at 09:08

## 2017-09-23 RX ADMIN — CEFAZOLIN SODIUM 1 G: 1 INJECTION, SOLUTION INTRAVENOUS at 16:19

## 2017-09-23 RX ADMIN — OXYCODONE HYDROCHLORIDE 5 MG: 5 TABLET ORAL at 20:48

## 2017-09-23 RX ADMIN — APIXABAN 5 MG: 5 TABLET, FILM COATED ORAL at 20:48

## 2017-09-23 RX ADMIN — SODIUM CHLORIDE, POTASSIUM CHLORIDE, SODIUM LACTATE AND CALCIUM CHLORIDE: 600; 310; 30; 20 INJECTION, SOLUTION INTRAVENOUS at 04:12

## 2017-09-23 RX ADMIN — METHYLPREDNISOLONE SODIUM SUCCINATE 40 MG: 40 INJECTION, POWDER, FOR SOLUTION INTRAMUSCULAR; INTRAVENOUS at 06:29

## 2017-09-23 RX ADMIN — INSULIN LISPRO 1 UNITS: 100 INJECTION, SOLUTION INTRAVENOUS; SUBCUTANEOUS at 09:09

## 2017-09-23 RX ADMIN — CEFAZOLIN SODIUM 1 G: 1 INJECTION, SOLUTION INTRAVENOUS at 09:07

## 2017-09-23 RX ADMIN — PANTOPRAZOLE SODIUM 40 MG: 40 TABLET, DELAYED RELEASE ORAL at 06:29

## 2017-09-23 RX ADMIN — DOXAZOSIN 4 MG: 4 TABLET ORAL at 20:48

## 2017-09-23 RX ADMIN — CYANOCOBALAMIN TAB 1000 MCG 1000 MCG: 1000 TAB at 09:08

## 2017-09-23 RX ADMIN — INSULIN LISPRO 1 UNITS: 100 INJECTION, SOLUTION INTRAVENOUS; SUBCUTANEOUS at 21:21

## 2017-09-23 RX ADMIN — CEFAZOLIN SODIUM 1 G: 1 INJECTION, SOLUTION INTRAVENOUS at 00:53

## 2017-09-23 RX ADMIN — CLOTRIMAZOLE AND BETAMETHASONE DIPROPIONATE: 10; .5 CREAM TOPICAL at 09:07

## 2017-09-23 RX ADMIN — ATORVASTATIN CALCIUM 20 MG: 20 TABLET, FILM COATED ORAL at 09:08

## 2017-09-23 RX ADMIN — INSULIN LISPRO 3 UNITS: 100 INJECTION, SOLUTION INTRAVENOUS; SUBCUTANEOUS at 13:04

## 2017-09-23 RX ADMIN — Medication 400 MG: at 09:08

## 2017-09-23 RX ADMIN — ANTACID TABLETS 500 MG: 500 TABLET, CHEWABLE ORAL at 04:09

## 2017-09-23 ASSESSMENT — PAIN DESCRIPTION - PROGRESSION: CLINICAL_PROGRESSION: GRADUALLY IMPROVING

## 2017-09-23 ASSESSMENT — PAIN DESCRIPTION - ORIENTATION
ORIENTATION: LEFT;LOWER
ORIENTATION: LEFT

## 2017-09-23 ASSESSMENT — PAIN DESCRIPTION - PAIN TYPE
TYPE: SURGICAL PAIN

## 2017-09-23 ASSESSMENT — PAIN SCALES - GENERAL
PAINLEVEL_OUTOF10: 4
PAINLEVEL_OUTOF10: 7
PAINLEVEL_OUTOF10: 7
PAINLEVEL_OUTOF10: 1
PAINLEVEL_OUTOF10: 4
PAINLEVEL_OUTOF10: 0
PAINLEVEL_OUTOF10: 5
PAINLEVEL_OUTOF10: 3
PAINLEVEL_OUTOF10: 0
PAINLEVEL_OUTOF10: 0

## 2017-09-23 ASSESSMENT — PAIN DESCRIPTION - DESCRIPTORS
DESCRIPTORS: ACHING
DESCRIPTORS: ACHING;CONSTANT

## 2017-09-23 ASSESSMENT — PAIN DESCRIPTION - LOCATION
LOCATION: KNEE
LOCATION: KNEE;BACK

## 2017-09-24 PROBLEM — E43 SEVERE MALNUTRITION (HCC): Status: ACTIVE | Noted: 2017-09-24

## 2017-09-24 PROBLEM — R73.03 PREDIABETES: Status: ACTIVE | Noted: 2017-09-24

## 2017-09-24 LAB
ABSOLUTE EOS #: 0 K/UL (ref 0–0.4)
ABSOLUTE LYMPH #: 0.9 K/UL (ref 1–4.8)
ABSOLUTE MONO #: 0.1 K/UL (ref 0.2–0.8)
ALBUMIN SERPL-MCNC: 2.6 G/DL (ref 3.5–5.2)
ALBUMIN/GLOBULIN RATIO: ABNORMAL (ref 1–2.5)
ALP BLD-CCNC: 56 U/L (ref 40–129)
ALT SERPL-CCNC: 74 U/L (ref 5–41)
ANION GAP SERPL CALCULATED.3IONS-SCNC: 11 MMOL/L (ref 9–17)
AST SERPL-CCNC: 37 U/L
BASOPHILS # BLD: 0 %
BASOPHILS ABSOLUTE: 0 K/UL (ref 0–0.2)
BILIRUB SERPL-MCNC: 0.93 MG/DL (ref 0.3–1.2)
BILIRUBIN DIRECT: 0.22 MG/DL
BILIRUBIN, INDIRECT: 0.71 MG/DL (ref 0–1)
BUN BLDV-MCNC: 23 MG/DL (ref 8–23)
CALCIUM SERPL-MCNC: 8.1 MG/DL (ref 8.6–10.4)
CHLORIDE BLD-SCNC: 98 MMOL/L (ref 98–107)
CO2: 29 MMOL/L (ref 20–31)
CREAT SERPL-MCNC: 0.79 MG/DL (ref 0.7–1.2)
CULTURE: ABNORMAL
CULTURE: ABNORMAL
DIFFERENTIAL TYPE: ABNORMAL
DIRECT EXAM: ABNORMAL
EOSINOPHILS RELATIVE PERCENT: 0 %
GFR AFRICAN AMERICAN: >60 ML/MIN
GFR NON-AFRICAN AMERICAN: >60 ML/MIN
GFR SERPL CREATININE-BSD FRML MDRD: ABNORMAL ML/MIN/{1.73_M2}
GFR SERPL CREATININE-BSD FRML MDRD: ABNORMAL ML/MIN/{1.73_M2}
GLUCOSE BLD-MCNC: 141 MG/DL (ref 75–110)
GLUCOSE BLD-MCNC: 150 MG/DL (ref 70–99)
GLUCOSE BLD-MCNC: 160 MG/DL (ref 75–110)
GLUCOSE BLD-MCNC: 182 MG/DL (ref 75–110)
GLUCOSE BLD-MCNC: 217 MG/DL (ref 75–110)
HCT VFR BLD CALC: 44.3 % (ref 41–53)
HEMOGLOBIN: 14.6 G/DL (ref 13.5–17.5)
LYMPHOCYTES # BLD: 7 %
Lab: ABNORMAL
MCH RBC QN AUTO: 29.9 PG (ref 26–34)
MCHC RBC AUTO-ENTMCNC: 32.9 G/DL (ref 31–37)
MCV RBC AUTO: 90.8 FL (ref 80–100)
MONOCYTES # BLD: 1 %
ORGANISM: ABNORMAL
PDW BLD-RTO: 14.4 % (ref 11.5–14.5)
PLATELET # BLD: 217 K/UL (ref 130–400)
PLATELET ESTIMATE: ABNORMAL
PMV BLD AUTO: 9 FL (ref 6–12)
POTASSIUM SERPL-SCNC: 4.6 MMOL/L (ref 3.7–5.3)
RBC # BLD: 4.88 M/UL (ref 4.5–5.9)
RBC # BLD: ABNORMAL 10*6/UL
SEG NEUTROPHILS: 92 %
SEGMENTED NEUTROPHILS ABSOLUTE COUNT: 11.6 K/UL (ref 1.8–7.7)
SODIUM BLD-SCNC: 138 MMOL/L (ref 135–144)
SPECIMEN DESCRIPTION: ABNORMAL
STATUS: ABNORMAL
TOTAL PROTEIN: 6.3 G/DL (ref 6.4–8.3)
WBC # BLD: 12.6 K/UL (ref 3.5–11)
WBC # BLD: ABNORMAL 10*3/UL

## 2017-09-24 PROCEDURE — 6360000002 HC RX W HCPCS: Performed by: INTERNAL MEDICINE

## 2017-09-24 PROCEDURE — 36415 COLL VENOUS BLD VENIPUNCTURE: CPT

## 2017-09-24 PROCEDURE — 2580000003 HC RX 258: Performed by: INTERNAL MEDICINE

## 2017-09-24 PROCEDURE — 80053 COMPREHEN METABOLIC PANEL: CPT

## 2017-09-24 PROCEDURE — 94640 AIRWAY INHALATION TREATMENT: CPT

## 2017-09-24 PROCEDURE — 82248 BILIRUBIN DIRECT: CPT

## 2017-09-24 PROCEDURE — 6370000000 HC RX 637 (ALT 250 FOR IP): Performed by: INTERNAL MEDICINE

## 2017-09-24 PROCEDURE — 6370000000 HC RX 637 (ALT 250 FOR IP): Performed by: ORTHOPAEDIC SURGERY

## 2017-09-24 PROCEDURE — 82947 ASSAY GLUCOSE BLOOD QUANT: CPT

## 2017-09-24 PROCEDURE — 85025 COMPLETE CBC W/AUTO DIFF WBC: CPT

## 2017-09-24 PROCEDURE — 97530 THERAPEUTIC ACTIVITIES: CPT

## 2017-09-24 PROCEDURE — 2060000000 HC ICU INTERMEDIATE R&B

## 2017-09-24 PROCEDURE — 6370000000 HC RX 637 (ALT 250 FOR IP): Performed by: NURSE PRACTITIONER

## 2017-09-24 RX ORDER — ATORVASTATIN CALCIUM 40 MG/1
40 TABLET, FILM COATED ORAL DAILY
Status: DISCONTINUED | OUTPATIENT
Start: 2017-09-25 | End: 2017-09-26 | Stop reason: HOSPADM

## 2017-09-24 RX ADMIN — VANCOMYCIN HYDROCHLORIDE 1250 MG: 1 INJECTION, POWDER, LYOPHILIZED, FOR SOLUTION INTRAVENOUS at 22:12

## 2017-09-24 RX ADMIN — INSULIN LISPRO 1 UNITS: 100 INJECTION, SOLUTION INTRAVENOUS; SUBCUTANEOUS at 07:27

## 2017-09-24 RX ADMIN — DOCUSATE SODIUM 100 MG: 100 CAPSULE, LIQUID FILLED ORAL at 21:30

## 2017-09-24 RX ADMIN — CLOTRIMAZOLE AND BETAMETHASONE DIPROPIONATE: 10; .5 CREAM TOPICAL at 21:30

## 2017-09-24 RX ADMIN — OXYCODONE HYDROCHLORIDE 10 MG: 5 TABLET ORAL at 05:00

## 2017-09-24 RX ADMIN — INSULIN LISPRO 1 UNITS: 100 INJECTION, SOLUTION INTRAVENOUS; SUBCUTANEOUS at 17:14

## 2017-09-24 RX ADMIN — DOXAZOSIN 4 MG: 4 TABLET ORAL at 21:30

## 2017-09-24 RX ADMIN — CEFAZOLIN SODIUM 1 G: 1 INJECTION, SOLUTION INTRAVENOUS at 17:14

## 2017-09-24 RX ADMIN — Medication 10 ML: at 08:26

## 2017-09-24 RX ADMIN — CYANOCOBALAMIN TAB 1000 MCG 1000 MCG: 1000 TAB at 08:26

## 2017-09-24 RX ADMIN — ATORVASTATIN CALCIUM 20 MG: 20 TABLET, FILM COATED ORAL at 08:28

## 2017-09-24 RX ADMIN — Medication 400 MG: at 08:32

## 2017-09-24 RX ADMIN — APIXABAN 5 MG: 5 TABLET, FILM COATED ORAL at 08:28

## 2017-09-24 RX ADMIN — CEFAZOLIN SODIUM 1 G: 1 INJECTION, SOLUTION INTRAVENOUS at 08:38

## 2017-09-24 RX ADMIN — INSULIN LISPRO 1 UNITS: 100 INJECTION, SOLUTION INTRAVENOUS; SUBCUTANEOUS at 21:30

## 2017-09-24 RX ADMIN — CETIRIZINE HYDROCHLORIDE 10 MG: 10 TABLET, FILM COATED ORAL at 08:28

## 2017-09-24 RX ADMIN — ALBUTEROL SULFATE 2.5 MG: 2.5 SOLUTION RESPIRATORY (INHALATION) at 09:46

## 2017-09-24 RX ADMIN — DOCUSATE SODIUM 100 MG: 100 CAPSULE, LIQUID FILLED ORAL at 08:28

## 2017-09-24 RX ADMIN — APIXABAN 5 MG: 5 TABLET, FILM COATED ORAL at 21:30

## 2017-09-24 RX ADMIN — OXYCODONE HYDROCHLORIDE 10 MG: 5 TABLET ORAL at 10:39

## 2017-09-24 RX ADMIN — Medication 0.5 MG: at 23:42

## 2017-09-24 RX ADMIN — PANTOPRAZOLE SODIUM 40 MG: 40 TABLET, DELAYED RELEASE ORAL at 07:19

## 2017-09-24 RX ADMIN — Medication 10 ML: at 08:31

## 2017-09-24 RX ADMIN — CEFAZOLIN SODIUM 1 G: 1 INJECTION, SOLUTION INTRAVENOUS at 01:14

## 2017-09-24 RX ADMIN — CLOTRIMAZOLE AND BETAMETHASONE DIPROPIONATE: 10; .5 CREAM TOPICAL at 10:00

## 2017-09-24 RX ADMIN — INSULIN LISPRO 1 UNITS: 100 INJECTION, SOLUTION INTRAVENOUS; SUBCUTANEOUS at 12:58

## 2017-09-24 RX ADMIN — ASPIRIN 81 MG: 81 TABLET, COATED ORAL at 08:28

## 2017-09-24 RX ADMIN — Medication 0.5 MG: at 06:46

## 2017-09-24 RX ADMIN — PREDNISONE 40 MG: 20 TABLET ORAL at 08:28

## 2017-09-24 ASSESSMENT — PAIN SCALES - GENERAL
PAINLEVEL_OUTOF10: 7
PAINLEVEL_OUTOF10: 10
PAINLEVEL_OUTOF10: 7
PAINLEVEL_OUTOF10: 7
PAINLEVEL_OUTOF10: 5
PAINLEVEL_OUTOF10: 8

## 2017-09-24 ASSESSMENT — PAIN DESCRIPTION - LOCATION
LOCATION: BACK
LOCATION: BACK

## 2017-09-24 ASSESSMENT — PAIN DESCRIPTION - DESCRIPTORS
DESCRIPTORS: ACHING;CONSTANT
DESCRIPTORS: ACHING;CONSTANT;SORE

## 2017-09-24 ASSESSMENT — PAIN DESCRIPTION - PROGRESSION
CLINICAL_PROGRESSION: NOT CHANGED
CLINICAL_PROGRESSION: NOT CHANGED

## 2017-09-24 ASSESSMENT — PAIN DESCRIPTION - ORIENTATION
ORIENTATION: MID
ORIENTATION: MID

## 2017-09-24 ASSESSMENT — PAIN DESCRIPTION - PAIN TYPE
TYPE: CHRONIC PAIN
TYPE: CHRONIC PAIN

## 2017-09-25 ENCOUNTER — APPOINTMENT (OUTPATIENT)
Dept: INTERVENTIONAL RADIOLOGY/VASCULAR | Age: 77
DRG: 853 | End: 2017-09-25
Payer: MEDICARE

## 2017-09-25 LAB
CULTURE: NORMAL
CULTURE: NORMAL
DIRECT EXAM: NORMAL
GLUCOSE BLD-MCNC: 127 MG/DL (ref 75–110)
GLUCOSE BLD-MCNC: 173 MG/DL (ref 75–110)
GLUCOSE BLD-MCNC: 174 MG/DL (ref 75–110)
GLUCOSE BLD-MCNC: 202 MG/DL (ref 75–110)
Lab: NORMAL
SPECIMEN DESCRIPTION: NORMAL
STATUS: NORMAL
VANCOMYCIN TROUGH DATE LAST DOSE: NORMAL
VANCOMYCIN TROUGH DOSE AMOUNT: NORMAL
VANCOMYCIN TROUGH TIME LAST DOSE: NORMAL
VANCOMYCIN TROUGH: 12.7 UG/ML (ref 10–20)

## 2017-09-25 PROCEDURE — 6370000000 HC RX 637 (ALT 250 FOR IP): Performed by: NURSE PRACTITIONER

## 2017-09-25 PROCEDURE — 2060000000 HC ICU INTERMEDIATE R&B

## 2017-09-25 PROCEDURE — 36415 COLL VENOUS BLD VENIPUNCTURE: CPT

## 2017-09-25 PROCEDURE — C1751 CATH, INF, PER/CENT/MIDLINE: HCPCS

## 2017-09-25 PROCEDURE — 36569 INSJ PICC 5 YR+ W/O IMAGING: CPT | Performed by: RADIOLOGY

## 2017-09-25 PROCEDURE — 6370000000 HC RX 637 (ALT 250 FOR IP): Performed by: INTERNAL MEDICINE

## 2017-09-25 PROCEDURE — 2580000003 HC RX 258: Performed by: INTERNAL MEDICINE

## 2017-09-25 PROCEDURE — 6360000002 HC RX W HCPCS: Performed by: INTERNAL MEDICINE

## 2017-09-25 PROCEDURE — 82947 ASSAY GLUCOSE BLOOD QUANT: CPT

## 2017-09-25 PROCEDURE — 94762 N-INVAS EAR/PLS OXIMTRY CONT: CPT

## 2017-09-25 PROCEDURE — 77001 FLUOROGUIDE FOR VEIN DEVICE: CPT | Performed by: RADIOLOGY

## 2017-09-25 PROCEDURE — 97116 GAIT TRAINING THERAPY: CPT

## 2017-09-25 PROCEDURE — 80202 ASSAY OF VANCOMYCIN: CPT

## 2017-09-25 PROCEDURE — 97535 SELF CARE MNGMENT TRAINING: CPT | Performed by: NURSE PRACTITIONER

## 2017-09-25 PROCEDURE — 94640 AIRWAY INHALATION TREATMENT: CPT

## 2017-09-25 PROCEDURE — 76937 US GUIDE VASCULAR ACCESS: CPT | Performed by: RADIOLOGY

## 2017-09-25 PROCEDURE — 02HV33Z INSERTION OF INFUSION DEVICE INTO SUPERIOR VENA CAVA, PERCUTANEOUS APPROACH: ICD-10-PCS | Performed by: RADIOLOGY

## 2017-09-25 PROCEDURE — 97530 THERAPEUTIC ACTIVITIES: CPT | Performed by: NURSE PRACTITIONER

## 2017-09-25 PROCEDURE — 6370000000 HC RX 637 (ALT 250 FOR IP): Performed by: ORTHOPAEDIC SURGERY

## 2017-09-25 PROCEDURE — 94760 N-INVAS EAR/PLS OXIMETRY 1: CPT

## 2017-09-25 RX ADMIN — Medication 10 ML: at 08:48

## 2017-09-25 RX ADMIN — PREDNISONE 40 MG: 20 TABLET ORAL at 08:48

## 2017-09-25 RX ADMIN — APIXABAN 5 MG: 5 TABLET, FILM COATED ORAL at 08:48

## 2017-09-25 RX ADMIN — ATORVASTATIN CALCIUM 40 MG: 40 TABLET, FILM COATED ORAL at 15:14

## 2017-09-25 RX ADMIN — Medication 10 ML: at 21:00

## 2017-09-25 RX ADMIN — PANTOPRAZOLE SODIUM 40 MG: 40 TABLET, DELAYED RELEASE ORAL at 06:12

## 2017-09-25 RX ADMIN — INSULIN LISPRO 1 UNITS: 100 INJECTION, SOLUTION INTRAVENOUS; SUBCUTANEOUS at 08:50

## 2017-09-25 RX ADMIN — OXYCODONE HYDROCHLORIDE 5 MG: 5 TABLET ORAL at 19:48

## 2017-09-25 RX ADMIN — CLOTRIMAZOLE AND BETAMETHASONE DIPROPIONATE: 10; .5 CREAM TOPICAL at 08:48

## 2017-09-25 RX ADMIN — VANCOMYCIN HYDROCHLORIDE 1250 MG: 1 INJECTION, POWDER, LYOPHILIZED, FOR SOLUTION INTRAVENOUS at 13:38

## 2017-09-25 RX ADMIN — CYANOCOBALAMIN TAB 1000 MCG 1000 MCG: 1000 TAB at 08:49

## 2017-09-25 RX ADMIN — DOCUSATE SODIUM 100 MG: 100 CAPSULE, LIQUID FILLED ORAL at 08:48

## 2017-09-25 RX ADMIN — OXYCODONE HYDROCHLORIDE 5 MG: 5 TABLET ORAL at 00:45

## 2017-09-25 RX ADMIN — VANCOMYCIN HYDROCHLORIDE 1500 MG: 1 INJECTION, POWDER, LYOPHILIZED, FOR SOLUTION INTRAVENOUS at 22:00

## 2017-09-25 RX ADMIN — Medication 400 MG: at 08:48

## 2017-09-25 RX ADMIN — TIOTROPIUM BROMIDE INHALATION SPRAY 2 PUFF: 3.12 SPRAY, METERED RESPIRATORY (INHALATION) at 11:55

## 2017-09-25 RX ADMIN — INSULIN LISPRO 1 UNITS: 100 INJECTION, SOLUTION INTRAVENOUS; SUBCUTANEOUS at 17:54

## 2017-09-25 RX ADMIN — INSULIN LISPRO 1 UNITS: 100 INJECTION, SOLUTION INTRAVENOUS; SUBCUTANEOUS at 13:40

## 2017-09-25 RX ADMIN — VANCOMYCIN HYDROCHLORIDE 1250 MG: 1 INJECTION, POWDER, LYOPHILIZED, FOR SOLUTION INTRAVENOUS at 06:08

## 2017-09-25 RX ADMIN — OXYCODONE HYDROCHLORIDE 5 MG: 5 TABLET ORAL at 06:20

## 2017-09-25 RX ADMIN — CETIRIZINE HYDROCHLORIDE 10 MG: 10 TABLET, FILM COATED ORAL at 08:48

## 2017-09-25 RX ADMIN — ASPIRIN 81 MG: 81 TABLET, COATED ORAL at 08:48

## 2017-09-25 RX ADMIN — OXYCODONE HYDROCHLORIDE 5 MG: 5 TABLET ORAL at 14:09

## 2017-09-25 RX ADMIN — APIXABAN 5 MG: 5 TABLET, FILM COATED ORAL at 21:25

## 2017-09-25 RX ADMIN — DOCUSATE SODIUM 100 MG: 100 CAPSULE, LIQUID FILLED ORAL at 21:25

## 2017-09-25 RX ADMIN — DOXAZOSIN 4 MG: 4 TABLET ORAL at 21:25

## 2017-09-25 RX ADMIN — Medication 0.5 MG: at 03:58

## 2017-09-25 ASSESSMENT — PAIN SCALES - GENERAL
PAINLEVEL_OUTOF10: 4
PAINLEVEL_OUTOF10: 5
PAINLEVEL_OUTOF10: 4
PAINLEVEL_OUTOF10: 5
PAINLEVEL_OUTOF10: 6
PAINLEVEL_OUTOF10: 5
PAINLEVEL_OUTOF10: 4
PAINLEVEL_OUTOF10: 3
PAINLEVEL_OUTOF10: 3
PAINLEVEL_OUTOF10: 5

## 2017-09-25 ASSESSMENT — PAIN DESCRIPTION - LOCATION
LOCATION: BACK
LOCATION: BACK

## 2017-09-25 ASSESSMENT — PAIN DESCRIPTION - PAIN TYPE
TYPE: CHRONIC PAIN
TYPE: CHRONIC PAIN

## 2017-09-26 ENCOUNTER — APPOINTMENT (OUTPATIENT)
Dept: GENERAL RADIOLOGY | Age: 77
DRG: 853 | End: 2017-09-26
Payer: MEDICARE

## 2017-09-26 VITALS
SYSTOLIC BLOOD PRESSURE: 133 MMHG | TEMPERATURE: 97.5 F | RESPIRATION RATE: 20 BRPM | DIASTOLIC BLOOD PRESSURE: 57 MMHG | HEIGHT: 73 IN | BODY MASS INDEX: 36.58 KG/M2 | WEIGHT: 276 LBS | HEART RATE: 75 BPM | OXYGEN SATURATION: 96 %

## 2017-09-26 PROBLEM — A41.2 STAPHYLOCOCCAL SEPTICEMIA (HCC): Status: ACTIVE | Noted: 2017-09-21

## 2017-09-26 LAB
ABSOLUTE EOS #: 0.11 K/UL (ref 0–0.4)
ABSOLUTE LYMPH #: 0.88 K/UL (ref 1–4.8)
ABSOLUTE MONO #: 0.44 K/UL (ref 0.2–0.8)
ALBUMIN SERPL-MCNC: 2.4 G/DL (ref 3.5–5.2)
ALBUMIN/GLOBULIN RATIO: ABNORMAL (ref 1–2.5)
ALP BLD-CCNC: 48 U/L (ref 40–129)
ALT SERPL-CCNC: 48 U/L (ref 5–41)
ANION GAP SERPL CALCULATED.3IONS-SCNC: 11 MMOL/L (ref 9–17)
AST SERPL-CCNC: 23 U/L
BASOPHILS # BLD: 0 %
BASOPHILS ABSOLUTE: 0 K/UL (ref 0–0.2)
BILIRUB SERPL-MCNC: 1.35 MG/DL (ref 0.3–1.2)
BILIRUBIN DIRECT: 0.31 MG/DL
BILIRUBIN, INDIRECT: 1.04 MG/DL (ref 0–1)
BUN BLDV-MCNC: 22 MG/DL (ref 8–23)
CALCIUM SERPL-MCNC: 8.1 MG/DL (ref 8.6–10.4)
CHLORIDE BLD-SCNC: 98 MMOL/L (ref 98–107)
CO2: 27 MMOL/L (ref 20–31)
CREAT SERPL-MCNC: 0.6 MG/DL (ref 0.7–1.2)
DIFFERENTIAL TYPE: ABNORMAL
EOSINOPHILS RELATIVE PERCENT: 1 %
GFR AFRICAN AMERICAN: >60 ML/MIN
GFR NON-AFRICAN AMERICAN: >60 ML/MIN
GFR SERPL CREATININE-BSD FRML MDRD: ABNORMAL ML/MIN/{1.73_M2}
GFR SERPL CREATININE-BSD FRML MDRD: ABNORMAL ML/MIN/{1.73_M2}
GLUCOSE BLD-MCNC: 127 MG/DL (ref 75–110)
GLUCOSE BLD-MCNC: 134 MG/DL (ref 70–99)
GLUCOSE BLD-MCNC: 221 MG/DL (ref 75–110)
GLUCOSE BLD-MCNC: 232 MG/DL (ref 75–110)
HCT VFR BLD CALC: 41.8 % (ref 41–53)
HEMOGLOBIN: 13.9 G/DL (ref 13.5–17.5)
LYMPHOCYTES # BLD: 8 %
MCH RBC QN AUTO: 30.2 PG (ref 26–34)
MCHC RBC AUTO-ENTMCNC: 33.2 G/DL (ref 31–37)
MCV RBC AUTO: 91.1 FL (ref 80–100)
MONOCYTES # BLD: 4 %
PDW BLD-RTO: 14.2 % (ref 11.5–14.5)
PLATELET # BLD: 221 K/UL (ref 130–400)
PLATELET ESTIMATE: ABNORMAL
PMV BLD AUTO: 8.3 FL (ref 6–12)
POTASSIUM SERPL-SCNC: 4.3 MMOL/L (ref 3.7–5.3)
RBC # BLD: 4.58 M/UL (ref 4.5–5.9)
RBC # BLD: ABNORMAL 10*6/UL
SEG NEUTROPHILS: 87 %
SEGMENTED NEUTROPHILS ABSOLUTE COUNT: 9.57 K/UL (ref 1.8–7.7)
SODIUM BLD-SCNC: 136 MMOL/L (ref 135–144)
TOTAL PROTEIN: 6 G/DL (ref 6.4–8.3)
WBC # BLD: 11 K/UL (ref 3.5–11)
WBC # BLD: ABNORMAL 10*3/UL

## 2017-09-26 PROCEDURE — 6370000000 HC RX 637 (ALT 250 FOR IP): Performed by: INTERNAL MEDICINE

## 2017-09-26 PROCEDURE — 80053 COMPREHEN METABOLIC PANEL: CPT

## 2017-09-26 PROCEDURE — 6370000000 HC RX 637 (ALT 250 FOR IP): Performed by: NURSE PRACTITIONER

## 2017-09-26 PROCEDURE — 6370000000 HC RX 637 (ALT 250 FOR IP): Performed by: ORTHOPAEDIC SURGERY

## 2017-09-26 PROCEDURE — 82248 BILIRUBIN DIRECT: CPT

## 2017-09-26 PROCEDURE — 85025 COMPLETE CBC W/AUTO DIFF WBC: CPT

## 2017-09-26 PROCEDURE — 97530 THERAPEUTIC ACTIVITIES: CPT | Performed by: NURSE PRACTITIONER

## 2017-09-26 PROCEDURE — 2580000003 HC RX 258: Performed by: INTERNAL MEDICINE

## 2017-09-26 PROCEDURE — 6360000002 HC RX W HCPCS: Performed by: INTERNAL MEDICINE

## 2017-09-26 PROCEDURE — 94760 N-INVAS EAR/PLS OXIMETRY 1: CPT

## 2017-09-26 PROCEDURE — 71010 XR CHEST PORTABLE: CPT

## 2017-09-26 PROCEDURE — 94640 AIRWAY INHALATION TREATMENT: CPT

## 2017-09-26 PROCEDURE — 97535 SELF CARE MNGMENT TRAINING: CPT | Performed by: NURSE PRACTITIONER

## 2017-09-26 PROCEDURE — 82947 ASSAY GLUCOSE BLOOD QUANT: CPT

## 2017-09-26 PROCEDURE — 36415 COLL VENOUS BLD VENIPUNCTURE: CPT

## 2017-09-26 RX ORDER — ALBUTEROL SULFATE 2.5 MG/3ML
2.5 SOLUTION RESPIRATORY (INHALATION) 4 TIMES DAILY
Qty: 120 EACH | Refills: 3 | Status: SHIPPED | OUTPATIENT
Start: 2017-09-26 | End: 2018-01-22 | Stop reason: ALTCHOICE

## 2017-09-26 RX ORDER — CLOTRIMAZOLE AND BETAMETHASONE DIPROPIONATE 10; .64 MG/G; MG/G
CREAM TOPICAL
Qty: 15 G | Refills: 0 | Status: ON HOLD | DISCHARGE
Start: 2017-09-26 | End: 2017-11-08 | Stop reason: ALTCHOICE

## 2017-09-26 RX ORDER — PREDNISONE 10 MG/1
20 TABLET ORAL DAILY
Qty: 8 TABLET | Refills: 0 | DISCHARGE
Start: 2017-09-26 | End: 2017-09-30

## 2017-09-26 RX ORDER — CETIRIZINE HYDROCHLORIDE 10 MG/1
10 TABLET ORAL DAILY
Qty: 30 TABLET | Refills: 0 | Status: ON HOLD | DISCHARGE
Start: 2017-09-26 | End: 2017-11-08 | Stop reason: ALTCHOICE

## 2017-09-26 RX ORDER — PANTOPRAZOLE SODIUM 40 MG/1
40 TABLET, DELAYED RELEASE ORAL
Qty: 30 TABLET | Refills: 3 | Status: ON HOLD | OUTPATIENT
Start: 2017-09-26 | End: 2017-11-08 | Stop reason: ALTCHOICE

## 2017-09-26 RX ORDER — OXYCODONE HYDROCHLORIDE 5 MG/1
5 TABLET ORAL EVERY 4 HOURS PRN
Qty: 21 TABLET | Refills: 0 | Status: SHIPPED | OUTPATIENT
Start: 2017-09-26 | End: 2017-10-03

## 2017-09-26 RX ORDER — PSEUDOEPHEDRINE HCL 30 MG
100 TABLET ORAL 2 TIMES DAILY
Qty: 60 CAPSULE | Refills: 0 | DISCHARGE
Start: 2017-09-26 | End: 2018-02-07 | Stop reason: ALTCHOICE

## 2017-09-26 RX ORDER — PREDNISONE 20 MG/1
30 TABLET ORAL DAILY
Qty: 6 TABLET | Refills: 0 | DISCHARGE
Start: 2017-09-26 | End: 2017-09-30

## 2017-09-26 RX ORDER — PREDNISONE 1 MG/1
10 TABLET ORAL
Qty: 8 TABLET | Refills: 0 | DISCHARGE
Start: 2017-09-26 | End: 2017-10-03

## 2017-09-26 RX ORDER — PREDNISONE 10 MG/1
10 TABLET ORAL DAILY
Qty: 4 TABLET | Refills: 0 | DISCHARGE
Start: 2017-09-26 | End: 2017-09-30

## 2017-09-26 RX ADMIN — DOCUSATE SODIUM 100 MG: 100 CAPSULE, LIQUID FILLED ORAL at 09:10

## 2017-09-26 RX ADMIN — OXYCODONE HYDROCHLORIDE 10 MG: 5 TABLET ORAL at 09:30

## 2017-09-26 RX ADMIN — Medication 400 MG: at 09:11

## 2017-09-26 RX ADMIN — APIXABAN 5 MG: 5 TABLET, FILM COATED ORAL at 09:10

## 2017-09-26 RX ADMIN — CYANOCOBALAMIN TAB 1000 MCG 1000 MCG: 1000 TAB at 09:10

## 2017-09-26 RX ADMIN — INSULIN LISPRO 2 UNITS: 100 INJECTION, SOLUTION INTRAVENOUS; SUBCUTANEOUS at 17:07

## 2017-09-26 RX ADMIN — VANCOMYCIN HYDROCHLORIDE 1500 MG: 1 INJECTION, POWDER, LYOPHILIZED, FOR SOLUTION INTRAVENOUS at 13:27

## 2017-09-26 RX ADMIN — CETIRIZINE HYDROCHLORIDE 10 MG: 10 TABLET, FILM COATED ORAL at 09:11

## 2017-09-26 RX ADMIN — Medication 10 ML: at 09:11

## 2017-09-26 RX ADMIN — ATORVASTATIN CALCIUM 40 MG: 40 TABLET, FILM COATED ORAL at 09:11

## 2017-09-26 RX ADMIN — INSULIN LISPRO 2 UNITS: 100 INJECTION, SOLUTION INTRAVENOUS; SUBCUTANEOUS at 13:21

## 2017-09-26 RX ADMIN — VANCOMYCIN HYDROCHLORIDE 1500 MG: 1 INJECTION, POWDER, LYOPHILIZED, FOR SOLUTION INTRAVENOUS at 05:54

## 2017-09-26 RX ADMIN — ASPIRIN 81 MG: 81 TABLET, COATED ORAL at 09:10

## 2017-09-26 RX ADMIN — CLOTRIMAZOLE AND BETAMETHASONE DIPROPIONATE: 10; .5 CREAM TOPICAL at 09:11

## 2017-09-26 RX ADMIN — PREDNISONE 40 MG: 20 TABLET ORAL at 09:10

## 2017-09-26 RX ADMIN — TIOTROPIUM BROMIDE INHALATION SPRAY 2 PUFF: 3.12 SPRAY, METERED RESPIRATORY (INHALATION) at 09:39

## 2017-09-26 RX ADMIN — PANTOPRAZOLE SODIUM 40 MG: 40 TABLET, DELAYED RELEASE ORAL at 09:10

## 2017-09-26 RX ADMIN — OXYCODONE HYDROCHLORIDE 10 MG: 5 TABLET ORAL at 03:37

## 2017-09-26 ASSESSMENT — PAIN SCALES - GENERAL
PAINLEVEL_OUTOF10: 7
PAINLEVEL_OUTOF10: 6

## 2017-09-26 ASSESSMENT — PAIN DESCRIPTION - LOCATION: LOCATION: BACK

## 2017-09-26 ASSESSMENT — PAIN DESCRIPTION - PAIN TYPE: TYPE: CHRONIC PAIN

## 2017-09-29 LAB
CULTURE: NORMAL
Lab: NORMAL
SPECIMEN DESCRIPTION: NORMAL
STATUS: NORMAL
STATUS: NORMAL

## 2017-11-07 ENCOUNTER — APPOINTMENT (OUTPATIENT)
Dept: CT IMAGING | Age: 77
DRG: 477 | End: 2017-11-07
Payer: MEDICARE

## 2017-11-07 ENCOUNTER — HOSPITAL ENCOUNTER (INPATIENT)
Age: 77
LOS: 8 days | Discharge: NURSING, REHAB OR LONG-TERM CARE | DRG: 477 | End: 2017-11-15
Admitting: INTERNAL MEDICINE
Payer: MEDICARE

## 2017-11-07 ENCOUNTER — APPOINTMENT (OUTPATIENT)
Dept: GENERAL RADIOLOGY | Age: 77
DRG: 477 | End: 2017-11-07
Payer: MEDICARE

## 2017-11-07 DIAGNOSIS — M48.50XA PATHOLOGIC COMPRESSION FRACTURE OF SPINE, INITIAL ENCOUNTER (HCC): ICD-10-CM

## 2017-11-07 DIAGNOSIS — M46.24 OSTEOMYELITIS OF THORACIC REGION (HCC): Primary | ICD-10-CM

## 2017-11-07 LAB
-: ABNORMAL
ABSOLUTE EOS #: 0.2 K/UL (ref 0–0.4)
ABSOLUTE IMMATURE GRANULOCYTE: ABNORMAL K/UL (ref 0–0.3)
ABSOLUTE LYMPH #: 0.6 K/UL (ref 1–4.8)
ABSOLUTE MONO #: 0.6 K/UL (ref 0.2–0.8)
ALBUMIN SERPL-MCNC: 2.9 G/DL (ref 3.5–5.2)
ALBUMIN/GLOBULIN RATIO: ABNORMAL (ref 1–2.5)
ALP BLD-CCNC: 90 U/L (ref 40–129)
ALT SERPL-CCNC: 53 U/L (ref 5–41)
AMORPHOUS: ABNORMAL
AMYLASE: 30 U/L (ref 28–100)
ANION GAP SERPL CALCULATED.3IONS-SCNC: 14 MMOL/L (ref 9–17)
AST SERPL-CCNC: 47 U/L
BACTERIA: ABNORMAL
BASOPHILS # BLD: 0 %
BASOPHILS ABSOLUTE: 0 K/UL (ref 0–0.2)
BILIRUB SERPL-MCNC: 0.49 MG/DL (ref 0.3–1.2)
BILIRUBIN DIRECT: 0.11 MG/DL
BILIRUBIN URINE: NEGATIVE
BILIRUBIN, INDIRECT: 0.38 MG/DL (ref 0–1)
BNP INTERPRETATION: NORMAL
BUN BLDV-MCNC: 11 MG/DL (ref 8–23)
BUN/CREAT BLD: 19 (ref 9–20)
CALCIUM SERPL-MCNC: 8.8 MG/DL (ref 8.6–10.4)
CASTS UA: ABNORMAL /LPF
CHLORIDE BLD-SCNC: 91 MMOL/L (ref 98–107)
CO2: 25 MMOL/L (ref 20–31)
COLOR: YELLOW
COMMENT UA: ABNORMAL
CREAT SERPL-MCNC: 0.59 MG/DL (ref 0.7–1.2)
CRYSTALS, UA: ABNORMAL /HPF
DIFFERENTIAL TYPE: ABNORMAL
EKG ATRIAL RATE: 85 BPM
EKG P AXIS: 25 DEGREES
EKG P-R INTERVAL: 166 MS
EKG Q-T INTERVAL: 398 MS
EKG QRS DURATION: 90 MS
EKG QTC CALCULATION (BAZETT): 473 MS
EKG R AXIS: 3 DEGREES
EKG T AXIS: 13 DEGREES
EKG VENTRICULAR RATE: 85 BPM
EOSINOPHILS RELATIVE PERCENT: 2 %
EPITHELIAL CELLS UA: ABNORMAL /HPF
GFR AFRICAN AMERICAN: >60 ML/MIN
GFR NON-AFRICAN AMERICAN: >60 ML/MIN
GFR SERPL CREATININE-BSD FRML MDRD: ABNORMAL ML/MIN/{1.73_M2}
GFR SERPL CREATININE-BSD FRML MDRD: ABNORMAL ML/MIN/{1.73_M2}
GLOBULIN: ABNORMAL G/DL (ref 1.5–3.8)
GLUCOSE BLD-MCNC: 130 MG/DL (ref 70–99)
GLUCOSE URINE: NEGATIVE
HCT VFR BLD CALC: 29.4 % (ref 41–53)
HEMOGLOBIN: 9.9 G/DL (ref 13.5–17.5)
IMMATURE GRANULOCYTES: ABNORMAL %
KETONES, URINE: NEGATIVE
LACTIC ACID, WHOLE BLOOD: NORMAL MMOL/L (ref 0.7–2.1)
LACTIC ACID: 1.2 MMOL/L (ref 0.5–2.2)
LACTIC ACID: 1.8 MMOL/L (ref 0.5–2.2)
LEUKOCYTE ESTERASE, URINE: NEGATIVE
LIPASE: 22 U/L (ref 13–60)
LYMPHOCYTES # BLD: 7 %
MCH RBC QN AUTO: 27.2 PG (ref 26–34)
MCHC RBC AUTO-ENTMCNC: 33.6 G/DL (ref 31–37)
MCV RBC AUTO: 81.1 FL (ref 80–100)
MONOCYTES # BLD: 7 %
MUCUS: ABNORMAL
NITRITE, URINE: NEGATIVE
OTHER OBSERVATIONS UA: ABNORMAL
PDW BLD-RTO: 15.2 % (ref 11.5–14.5)
PH UA: 7 (ref 5–8)
PHOSPHORUS: 3 MG/DL (ref 2.5–4.5)
PLATELET # BLD: 303 K/UL (ref 130–400)
PLATELET ESTIMATE: ABNORMAL
PMV BLD AUTO: ABNORMAL FL (ref 6–12)
POTASSIUM SERPL-SCNC: 4.6 MMOL/L (ref 3.7–5.3)
PRO-BNP: 203 PG/ML
PROTEIN UA: NEGATIVE
RBC # BLD: 3.63 M/UL (ref 4.5–5.9)
RBC # BLD: ABNORMAL 10*6/UL
RBC UA: ABNORMAL /HPF (ref 0–2)
RENAL EPITHELIAL, UA: ABNORMAL /HPF
SEG NEUTROPHILS: 84 %
SEGMENTED NEUTROPHILS ABSOLUTE COUNT: 7.8 K/UL (ref 1.8–7.7)
SODIUM BLD-SCNC: 130 MMOL/L (ref 135–144)
SPECIFIC GRAVITY UA: 1.01 (ref 1–1.03)
TOTAL PROTEIN: 7.6 G/DL (ref 6.4–8.3)
TRICHOMONAS: ABNORMAL
TROPONIN INTERP: NORMAL
TROPONIN T: <0.03 NG/ML
TURBIDITY: CLEAR
URINE HGB: ABNORMAL
UROBILINOGEN, URINE: NORMAL
WBC # BLD: 9.2 K/UL (ref 3.5–11)
WBC # BLD: ABNORMAL 10*3/UL
WBC UA: ABNORMAL /HPF (ref 0–5)
YEAST: ABNORMAL

## 2017-11-07 PROCEDURE — 6360000002 HC RX W HCPCS

## 2017-11-07 PROCEDURE — 83880 ASSAY OF NATRIURETIC PEPTIDE: CPT

## 2017-11-07 PROCEDURE — 71020 XR CHEST STANDARD TWO VW: CPT

## 2017-11-07 PROCEDURE — 96375 TX/PRO/DX INJ NEW DRUG ADDON: CPT

## 2017-11-07 PROCEDURE — 2580000003 HC RX 258

## 2017-11-07 PROCEDURE — 83605 ASSAY OF LACTIC ACID: CPT

## 2017-11-07 PROCEDURE — 87070 CULTURE OTHR SPECIMN AEROBIC: CPT

## 2017-11-07 PROCEDURE — 96376 TX/PRO/DX INJ SAME DRUG ADON: CPT

## 2017-11-07 PROCEDURE — 87205 SMEAR GRAM STAIN: CPT

## 2017-11-07 PROCEDURE — 84484 ASSAY OF TROPONIN QUANT: CPT

## 2017-11-07 PROCEDURE — 80048 BASIC METABOLIC PNL TOTAL CA: CPT

## 2017-11-07 PROCEDURE — 83690 ASSAY OF LIPASE: CPT

## 2017-11-07 PROCEDURE — 6370000000 HC RX 637 (ALT 250 FOR IP): Performed by: INTERNAL MEDICINE

## 2017-11-07 PROCEDURE — 81001 URINALYSIS AUTO W/SCOPE: CPT

## 2017-11-07 PROCEDURE — 80076 HEPATIC FUNCTION PANEL: CPT

## 2017-11-07 PROCEDURE — 87040 BLOOD CULTURE FOR BACTERIA: CPT

## 2017-11-07 PROCEDURE — 72128 CT CHEST SPINE W/O DYE: CPT

## 2017-11-07 PROCEDURE — 96374 THER/PROPH/DIAG INJ IV PUSH: CPT

## 2017-11-07 PROCEDURE — 1200000000 HC SEMI PRIVATE

## 2017-11-07 PROCEDURE — 6360000002 HC RX W HCPCS: Performed by: INTERNAL MEDICINE

## 2017-11-07 PROCEDURE — 93005 ELECTROCARDIOGRAM TRACING: CPT

## 2017-11-07 PROCEDURE — 87086 URINE CULTURE/COLONY COUNT: CPT

## 2017-11-07 PROCEDURE — 94640 AIRWAY INHALATION TREATMENT: CPT

## 2017-11-07 PROCEDURE — 99285 EMERGENCY DEPT VISIT HI MDM: CPT

## 2017-11-07 PROCEDURE — 82150 ASSAY OF AMYLASE: CPT

## 2017-11-07 PROCEDURE — 85025 COMPLETE CBC W/AUTO DIFF WBC: CPT

## 2017-11-07 PROCEDURE — 84100 ASSAY OF PHOSPHORUS: CPT

## 2017-11-07 PROCEDURE — 94760 N-INVAS EAR/PLS OXIMETRY 1: CPT

## 2017-11-07 RX ORDER — ACETAMINOPHEN 325 MG/1
650 TABLET ORAL EVERY 4 HOURS PRN
Status: DISCONTINUED | OUTPATIENT
Start: 2017-11-07 | End: 2017-11-07 | Stop reason: SDUPTHER

## 2017-11-07 RX ORDER — ATORVASTATIN CALCIUM 20 MG/1
20 TABLET, FILM COATED ORAL DAILY
Status: DISCONTINUED | OUTPATIENT
Start: 2017-11-08 | End: 2017-11-15 | Stop reason: HOSPADM

## 2017-11-07 RX ORDER — LEVOFLOXACIN 500 MG
500 TABLET ORAL DAILY
Status: ON HOLD | COMMUNITY
Start: 2017-11-04 | End: 2017-11-14 | Stop reason: HOSPADM

## 2017-11-07 RX ORDER — SULFAMETHOXAZOLE AND TRIMETHOPRIM 800; 160 MG/1; MG/1
1 TABLET ORAL 2 TIMES DAILY
Status: ON HOLD | COMMUNITY
End: 2017-11-08 | Stop reason: ALTCHOICE

## 2017-11-07 RX ORDER — OMEPRAZOLE 20 MG/1
20 TABLET, DELAYED RELEASE ORAL DAILY
Status: ON HOLD | COMMUNITY
End: 2017-11-08 | Stop reason: SDUPTHER

## 2017-11-07 RX ORDER — ALBUTEROL SULFATE 2.5 MG/3ML
2.5 SOLUTION RESPIRATORY (INHALATION) 4 TIMES DAILY
Status: DISCONTINUED | OUTPATIENT
Start: 2017-11-07 | End: 2017-11-15 | Stop reason: HOSPADM

## 2017-11-07 RX ORDER — SODIUM CHLORIDE 0.9 % (FLUSH) 0.9 %
10 SYRINGE (ML) INJECTION PRN
Status: DISCONTINUED | OUTPATIENT
Start: 2017-11-07 | End: 2017-11-07 | Stop reason: SDUPTHER

## 2017-11-07 RX ORDER — 0.9 % SODIUM CHLORIDE 0.9 %
1000 INTRAVENOUS SOLUTION INTRAVENOUS ONCE
Status: COMPLETED | OUTPATIENT
Start: 2017-11-07 | End: 2017-11-07

## 2017-11-07 RX ORDER — ONDANSETRON 2 MG/ML
4 INJECTION INTRAMUSCULAR; INTRAVENOUS EVERY 6 HOURS PRN
Status: DISCONTINUED | OUTPATIENT
Start: 2017-11-07 | End: 2017-11-15 | Stop reason: HOSPADM

## 2017-11-07 RX ORDER — POTASSIUM CHLORIDE 20 MEQ/1
40 TABLET, EXTENDED RELEASE ORAL PRN
Status: DISCONTINUED | OUTPATIENT
Start: 2017-11-07 | End: 2017-11-15 | Stop reason: HOSPADM

## 2017-11-07 RX ORDER — GUAIFENESIN 100 MG/5ML
400 SOLUTION ORAL EVERY 6 HOURS
Status: DISCONTINUED | OUTPATIENT
Start: 2017-11-07 | End: 2017-11-15 | Stop reason: HOSPADM

## 2017-11-07 RX ORDER — CETIRIZINE HYDROCHLORIDE 10 MG/1
10 TABLET ORAL DAILY
Status: DISCONTINUED | OUTPATIENT
Start: 2017-11-08 | End: 2017-11-07 | Stop reason: SDUPTHER

## 2017-11-07 RX ORDER — PANTOPRAZOLE SODIUM 40 MG/1
40 TABLET, DELAYED RELEASE ORAL
Status: DISCONTINUED | OUTPATIENT
Start: 2017-11-08 | End: 2017-11-15 | Stop reason: HOSPADM

## 2017-11-07 RX ORDER — SODIUM CHLORIDE 0.9 % (FLUSH) 0.9 %
10 SYRINGE (ML) INJECTION PRN
Status: DISCONTINUED | OUTPATIENT
Start: 2017-11-07 | End: 2017-11-15 | Stop reason: HOSPADM

## 2017-11-07 RX ORDER — GUAIFENESIN 400 MG/1
400 TABLET ORAL EVERY 6 HOURS
Status: ON HOLD | COMMUNITY
End: 2017-11-08 | Stop reason: ALTCHOICE

## 2017-11-07 RX ORDER — OXYCODONE HYDROCHLORIDE 10 MG/1
10 TABLET ORAL EVERY 6 HOURS PRN
COMMUNITY
End: 2018-02-07 | Stop reason: ALTCHOICE

## 2017-11-07 RX ORDER — POTASSIUM CHLORIDE 20MEQ/15ML
40 LIQUID (ML) ORAL PRN
Status: DISCONTINUED | OUTPATIENT
Start: 2017-11-07 | End: 2017-11-15 | Stop reason: HOSPADM

## 2017-11-07 RX ORDER — CYCLOBENZAPRINE HCL 5 MG
5 TABLET ORAL 2 TIMES DAILY PRN
COMMUNITY
End: 2018-01-22 | Stop reason: ALTCHOICE

## 2017-11-07 RX ORDER — TAMSULOSIN HYDROCHLORIDE 0.4 MG/1
0.4 CAPSULE ORAL DAILY
Status: DISCONTINUED | OUTPATIENT
Start: 2017-11-08 | End: 2017-11-15 | Stop reason: HOSPADM

## 2017-11-07 RX ORDER — ACETAMINOPHEN 325 MG/1
650 TABLET ORAL EVERY 4 HOURS PRN
Status: DISCONTINUED | OUTPATIENT
Start: 2017-11-07 | End: 2017-11-15 | Stop reason: HOSPADM

## 2017-11-07 RX ORDER — ONDANSETRON 2 MG/ML
4 INJECTION INTRAMUSCULAR; INTRAVENOUS EVERY 30 MIN PRN
Status: COMPLETED | OUTPATIENT
Start: 2017-11-07 | End: 2017-11-07

## 2017-11-07 RX ORDER — OMEPRAZOLE 20 MG/1
20 CAPSULE, DELAYED RELEASE ORAL DAILY
COMMUNITY
End: 2018-01-22 | Stop reason: ALTCHOICE

## 2017-11-07 RX ORDER — SODIUM CHLORIDE 0.9 % (FLUSH) 0.9 %
10 SYRINGE (ML) INJECTION EVERY 12 HOURS SCHEDULED
Status: DISCONTINUED | OUTPATIENT
Start: 2017-11-07 | End: 2017-11-07 | Stop reason: SDUPTHER

## 2017-11-07 RX ORDER — LANOLIN ALCOHOL/MO/W.PET/CERES
CREAM (GRAM) TOPICAL
Status: ON HOLD | COMMUNITY
End: 2017-11-08 | Stop reason: DRUGHIGH

## 2017-11-07 RX ORDER — POTASSIUM CHLORIDE 7.45 MG/ML
10 INJECTION INTRAVENOUS PRN
Status: DISCONTINUED | OUTPATIENT
Start: 2017-11-07 | End: 2017-11-15 | Stop reason: HOSPADM

## 2017-11-07 RX ORDER — DOXAZOSIN MESYLATE 4 MG/1
4 TABLET ORAL NIGHTLY
Status: DISCONTINUED | OUTPATIENT
Start: 2017-11-07 | End: 2017-11-15 | Stop reason: HOSPADM

## 2017-11-07 RX ORDER — CETIRIZINE HYDROCHLORIDE 10 MG/1
10 TABLET ORAL DAILY
Status: DISCONTINUED | OUTPATIENT
Start: 2017-11-08 | End: 2017-11-15 | Stop reason: HOSPADM

## 2017-11-07 RX ORDER — OXYCODONE HYDROCHLORIDE 5 MG/1
10 TABLET ORAL EVERY 8 HOURS PRN
Status: DISCONTINUED | OUTPATIENT
Start: 2017-11-07 | End: 2017-11-15 | Stop reason: HOSPADM

## 2017-11-07 RX ORDER — SODIUM CHLORIDE 0.9 % (FLUSH) 0.9 %
10 SYRINGE (ML) INJECTION EVERY 12 HOURS SCHEDULED
Status: DISCONTINUED | OUTPATIENT
Start: 2017-11-07 | End: 2017-11-15 | Stop reason: HOSPADM

## 2017-11-07 RX ORDER — LORATADINE 10 MG/1
10 CAPSULE, LIQUID FILLED ORAL DAILY
COMMUNITY
End: 2018-01-22 | Stop reason: ALTCHOICE

## 2017-11-07 RX ORDER — TAMSULOSIN HYDROCHLORIDE 0.4 MG/1
0.4 CAPSULE ORAL DAILY
COMMUNITY

## 2017-11-07 RX ADMIN — ONDANSETRON 4 MG: 2 INJECTION INTRAMUSCULAR; INTRAVENOUS at 18:33

## 2017-11-07 RX ADMIN — HYDROMORPHONE HYDROCHLORIDE 1 MG: 1 INJECTION, SOLUTION INTRAMUSCULAR; INTRAVENOUS; SUBCUTANEOUS at 18:33

## 2017-11-07 RX ADMIN — VANCOMYCIN HYDROCHLORIDE 1750 MG: 1 INJECTION, POWDER, LYOPHILIZED, FOR SOLUTION INTRAVENOUS at 19:54

## 2017-11-07 RX ADMIN — WATER 2 G: 1 INJECTION INTRAMUSCULAR; INTRAVENOUS; SUBCUTANEOUS at 19:10

## 2017-11-07 RX ADMIN — HYDROMORPHONE HYDROCHLORIDE 1 MG: 1 INJECTION, SOLUTION INTRAMUSCULAR; INTRAVENOUS; SUBCUTANEOUS at 15:41

## 2017-11-07 RX ADMIN — APIXABAN 5 MG: 5 TABLET, FILM COATED ORAL at 23:57

## 2017-11-07 RX ADMIN — OXYCODONE HYDROCHLORIDE 10 MG: 5 TABLET ORAL at 21:54

## 2017-11-07 RX ADMIN — SODIUM CHLORIDE 1000 ML: 9 INJECTION, SOLUTION INTRAVENOUS at 16:20

## 2017-11-07 RX ADMIN — ALBUTEROL SULFATE 2.5 MG: 2.5 SOLUTION RESPIRATORY (INHALATION) at 22:41

## 2017-11-07 RX ADMIN — DOXAZOSIN 4 MG: 4 TABLET ORAL at 23:57

## 2017-11-07 RX ADMIN — ONDANSETRON 4 MG: 2 INJECTION INTRAMUSCULAR; INTRAVENOUS at 15:42

## 2017-11-07 ASSESSMENT — PAIN DESCRIPTION - ORIENTATION: ORIENTATION: RIGHT

## 2017-11-07 ASSESSMENT — PAIN SCALES - GENERAL
PAINLEVEL_OUTOF10: 10
PAINLEVEL_OUTOF10: 10
PAINLEVEL_OUTOF10: 0
PAINLEVEL_OUTOF10: 7
PAINLEVEL_OUTOF10: 6
PAINLEVEL_OUTOF10: 3
PAINLEVEL_OUTOF10: 8

## 2017-11-07 ASSESSMENT — ENCOUNTER SYMPTOMS
APNEA: 0
TROUBLE SWALLOWING: 0
VOMITING: 0
SORE THROAT: 0
STRIDOR: 0
CONSTIPATION: 0
PHOTOPHOBIA: 0
NAUSEA: 1
COUGH: 1
ABDOMINAL PAIN: 1
SHORTNESS OF BREATH: 1
WHEEZING: 0
ABDOMINAL DISTENTION: 1
SINUS PAIN: 1
BACK PAIN: 1
CHEST TIGHTNESS: 1
SINUS PRESSURE: 1
CHOKING: 0

## 2017-11-07 ASSESSMENT — PAIN DESCRIPTION - PAIN TYPE: TYPE: ACUTE PAIN

## 2017-11-07 ASSESSMENT — PAIN DESCRIPTION - LOCATION
LOCATION: BACK
LOCATION: BACK

## 2017-11-07 ASSESSMENT — PAIN DESCRIPTION - DESCRIPTORS
DESCRIPTORS: BURNING
DESCRIPTORS: ACHING

## 2017-11-07 ASSESSMENT — PAIN DESCRIPTION - ONSET: ONSET: ON-GOING

## 2017-11-07 ASSESSMENT — PAIN DESCRIPTION - FREQUENCY: FREQUENCY: INTERMITTENT

## 2017-11-07 NOTE — ED PROVIDER NOTES
[address] [place] [address] ED  eMERGENCY dEPARTMENT eNCOUnter      Pt Name: Javad Lipscomb  MRN: [id number]  [place] 1940  Date of evaluation: 11/7/2017  Provider: Raj Neri MD    CHIEF COMPLAINT       Chief Complaint   Patient presents with    Back Pain         HISTORY OF PRESENT ILLNESS  (Location/Symptom, Timing/Onset, Context/Setting, Quality, Duration, Modifying Factors, Severity.)   Javad Lipscomb is a 76 y.o. male who presents to the emergency department With a history of back pain. He states that approximately a week ago he was at physical therapy and was lifting weights above his head when he felt a burning sensation in the right. Thoracic area. He had been in the nursing home for a pneumonia and an osteomyelitis. He had finished his antibiotics and has recently had his port pulled. He states that he is been feeling fine other than this persistent pain on the right for the perithoracic area that wraps around to the front of the chest and causes a hyperesthesia of the skin. His symptoms are almost prodromal for shingles. Nursing Notes were reviewed. ALLERGIES     Latex; Ibuprofen; Penicillins; and Cephalosporins    CURRENT MEDICATIONS       Previous Medications    ALBUTEROL (PROVENTIL) (2.5 MG/3ML) 0.083% NEBULIZER SOLUTION    Take 3 mLs by nebulization 4 times daily    APIXABAN (ELIQUIS) 5 MG TABS TABLET    Take 1 tablet by mouth 2 times daily    ASPIRIN 81 MG TABLET    Take 81 mg by mouth daily    ATORVASTATIN (LIPITOR) 20 MG TABLET    Take 20 mg by mouth daily    CALCIUM 500-100 MG-UNIT CHEW    Take by mouth    CETIRIZINE (ZYRTEC) 10 MG TABLET    Take 1 tablet by mouth daily    CLOTRIMAZOLE-BETAMETHASONE (LOTRISONE) 1-0.05 % CREAM    Apply topically 2 times daily.     CYCLOBENZAPRINE (FLEXERIL) 5 MG TABLET    Take 5 mg by mouth 3 times daily as needed for Muscle spasms    DOCUSATE SODIUM (COLACE, DULCOLAX) 100 MG CAPS    Take 100 mg by mouth 2 times daily    DOXAZOSIN (CARDURA) 4 MG TABLET Take 4 mg by mouth nightly    LEVOFLOXACIN (LEVAQUIN) 500 MG TABLET    Take 500 mg by mouth daily    MAGNESIUM OXIDE (MAG-OX) 400 MG TABLET    Take 400 mg by mouth daily    OMEPRAZOLE (PRILOSEC) 20 MG DELAYED RELEASE CAPSULE    Take 20 mg by mouth daily    OXYCODONE HCL (OXY-IR) 10 MG IMMEDIATE RELEASE TABLET    Take 10 mg by mouth every 8 hours as needed for Pain . PANTOPRAZOLE (PROTONIX) 40 MG TABLET    Take 1 tablet by mouth every morning (before breakfast)    SULFAMETHOXAZOLE-TRIMETHOPRIM (BACTRIM DS) 800-160 MG PER TABLET    Take 1 tablet by mouth 2 times daily    TAMSULOSIN (FLOMAX) 0.4 MG CAPSULE    Take 0.4 mg by mouth daily    TIOTROPIUM (SPIRIVA RESPIMAT) 2.5 MCG/ACT AERS INHALER    Inhale 2 puffs into the lungs daily    VITAMIN B-12 (CYANOCOBALAMIN) 500 MCG TABLET    Take 1,000 mcg by mouth daily       PAST MEDICAL HISTORY         Diagnosis Date    Arthritis     Left knee     Chronic back pain     and knee pain/ sees chiropractor    COPD (chronic obstructive pulmonary disease) (HCC)     Hyperlipidemia     Lumbar stenosis     L4-L5    MSSA (methicillin susceptible Staphylococcus aureus)     Prostate enlargement     Sleep apnea     wears c-pap    Thoracic back pain     T4-T5        SURGICAL HISTORY           Procedure Laterality Date    APPENDECTOMY      KNEE ARTHROSCOPY Left 09/22/2017    LEFT KNEE ARTHROSCOPIC LAVAGE, synovectomy; insertion drain    PILONIDAL CYST EXCISION      MD KNEE SCOPE,DIAGNOSTIC Left 9/22/2017    LEFT KNEE ARTHROSCOPIC LAVAGE performed by Arlet Wallace MD at [place] [address]     No family history on file. No family status information on file. SOCIAL HISTORY      reports that he has quit smoking. He does not have any smokeless tobacco history on file. He reports that he does not drink alcohol or use drugs.     REVIEW OF SYSTEMS    (2-9 systems for level 4, 10 or more for level 5)     Review of Systems   Constitutional: Positive for activity radicular symptoms on the right FINDINGS: BONES/ALIGNMENT: There is normal alignment of the spine except for dextroconvex scoliosis at T4-5. The vertebral body heights are maintained. Osteolytic changes are noted at the T4-5, T9-10, and T12-L1. Endplate Associated pathologic fractures are not excluded as there appears to be cortical breakthrough. This bony central canal and neural foramina are grossly patent DEGENERATIVE CHANGES: There is multilevel disc space narrowing and moderate spondylosis. SOFT TISSUES: No gross paraspinal mass is seen. There is a moderate right pleural effusion and right lower lobe consolidation. Multilevel discitis and possible pathologic fractures. Osteomyelitis is suspected. Metastatic disease less likely. Probable pathologic fractures but no loss of height at this time or retropulsion. No involvement of the posterior elements. Follow-up MRI with gadolinium recommended to exclude paraspinal or epidural abscess. Moderate right effusion and right lower lobe consolidation. RECOMMENDATIONS: The findings were sent to the Radiology Results [address] at 4:37 pm on 11/7/2017to be communicated to a licensed caregiver. Case discussed with Michael Chambers nurse practitioner at 4:42 p.m. Interpretation per the Radiologist below, if available at the time of this note:    CT THORACIC SPINE WO CONTRAST   Final Result   Multilevel discitis and possible pathologic fractures. Osteomyelitis is   suspected. Metastatic disease less likely. Probable pathologic fractures   but no loss of height at this time or retropulsion. No involvement of the   posterior elements. Follow-up MRI with gadolinium recommended to exclude   paraspinal or epidural abscess. Moderate right effusion and right lower lobe consolidation. RECOMMENDATIONS:   The findings were sent to the Radiology Results [address] at 4:37   pm on 11/7/2017to be communicated to a licensed caregiver.   Case

## 2017-11-07 NOTE — ED NOTES
Brought by ambulance from [facility] for pain to rt upper back started one week ago during therapy. sts pain getting worse and increases with movement. denies sob,vomiting or diarrhea. denies any fall. area of swelling to rt  Upper back. color normal skin warm et dry.      Shae Keenan RN  11/07/17 1131

## 2017-11-08 PROBLEM — M46.44 THORACIC DISCITIS: Status: ACTIVE | Noted: 2017-11-08

## 2017-11-08 PROBLEM — M46.24 ACUTE OSTEOMYELITIS OF THORACIC SPINE (HCC): Status: ACTIVE | Noted: 2017-11-08

## 2017-11-08 LAB
ABSOLUTE EOS #: 0.4 K/UL (ref 0–0.4)
ABSOLUTE IMMATURE GRANULOCYTE: ABNORMAL K/UL (ref 0–0.3)
ABSOLUTE LYMPH #: 0.6 K/UL (ref 1–4.8)
ABSOLUTE MONO #: 0.7 K/UL (ref 0.2–0.8)
ALBUMIN SERPL-MCNC: 2.5 G/DL (ref 3.5–5.2)
ALBUMIN/GLOBULIN RATIO: ABNORMAL (ref 1–2.5)
ALP BLD-CCNC: 84 U/L (ref 40–129)
ALT SERPL-CCNC: 38 U/L (ref 5–41)
ANION GAP SERPL CALCULATED.3IONS-SCNC: 10 MMOL/L (ref 9–17)
AST SERPL-CCNC: 24 U/L
BASOPHILS # BLD: 1 %
BASOPHILS ABSOLUTE: 0 K/UL (ref 0–0.2)
BILIRUB SERPL-MCNC: 0.43 MG/DL (ref 0.3–1.2)
BUN BLDV-MCNC: 7 MG/DL (ref 8–23)
BUN/CREAT BLD: 13 (ref 9–20)
CALCIUM SERPL-MCNC: 8.4 MG/DL (ref 8.6–10.4)
CHLORIDE BLD-SCNC: 96 MMOL/L (ref 98–107)
CO2: 27 MMOL/L (ref 20–31)
CREAT SERPL-MCNC: 0.54 MG/DL (ref 0.7–1.2)
CULTURE: NORMAL
CULTURE: NORMAL
DIFFERENTIAL TYPE: ABNORMAL
EOSINOPHILS RELATIVE PERCENT: 5 %
GFR AFRICAN AMERICAN: >60 ML/MIN
GFR NON-AFRICAN AMERICAN: >60 ML/MIN
GFR SERPL CREATININE-BSD FRML MDRD: ABNORMAL ML/MIN/{1.73_M2}
GFR SERPL CREATININE-BSD FRML MDRD: ABNORMAL ML/MIN/{1.73_M2}
GLUCOSE BLD-MCNC: 109 MG/DL (ref 70–99)
HCT VFR BLD CALC: 27.8 % (ref 41–53)
HEMOGLOBIN: 8.8 G/DL (ref 13.5–17.5)
IMMATURE GRANULOCYTES: ABNORMAL %
LYMPHOCYTES # BLD: 8 %
Lab: NORMAL
MCH RBC QN AUTO: 25.8 PG (ref 26–34)
MCHC RBC AUTO-ENTMCNC: 31.8 G/DL (ref 31–37)
MCV RBC AUTO: 81.2 FL (ref 80–100)
MONOCYTES # BLD: 10 %
PDW BLD-RTO: 15.3 % (ref 11.5–14.5)
PLATELET # BLD: 304 K/UL (ref 130–400)
PLATELET ESTIMATE: ABNORMAL
PMV BLD AUTO: ABNORMAL FL (ref 6–12)
POTASSIUM SERPL-SCNC: 3.9 MMOL/L (ref 3.7–5.3)
RBC # BLD: 3.43 M/UL (ref 4.5–5.9)
RBC # BLD: ABNORMAL 10*6/UL
SEG NEUTROPHILS: 76 %
SEGMENTED NEUTROPHILS ABSOLUTE COUNT: 5.7 K/UL (ref 1.8–7.7)
SODIUM BLD-SCNC: 133 MMOL/L (ref 135–144)
SPECIMEN DESCRIPTION: NORMAL
SPECIMEN DESCRIPTION: NORMAL
STATUS: NORMAL
TOTAL PROTEIN: 6.6 G/DL (ref 6.4–8.3)
WBC # BLD: 7.4 K/UL (ref 3.5–11)
WBC # BLD: ABNORMAL 10*3/UL

## 2017-11-08 PROCEDURE — 2580000003 HC RX 258: Performed by: INTERNAL MEDICINE

## 2017-11-08 PROCEDURE — 2700000000 HC OXYGEN THERAPY PER DAY

## 2017-11-08 PROCEDURE — 85025 COMPLETE CBC W/AUTO DIFF WBC: CPT

## 2017-11-08 PROCEDURE — 6360000002 HC RX W HCPCS

## 2017-11-08 PROCEDURE — 6360000002 HC RX W HCPCS: Performed by: INTERNAL MEDICINE

## 2017-11-08 PROCEDURE — 94664 DEMO&/EVAL PT USE INHALER: CPT

## 2017-11-08 PROCEDURE — 87641 MR-STAPH DNA AMP PROBE: CPT

## 2017-11-08 PROCEDURE — 99222 1ST HOSP IP/OBS MODERATE 55: CPT | Performed by: INTERNAL MEDICINE

## 2017-11-08 PROCEDURE — 1200000000 HC SEMI PRIVATE

## 2017-11-08 PROCEDURE — 6370000000 HC RX 637 (ALT 250 FOR IP): Performed by: INTERNAL MEDICINE

## 2017-11-08 PROCEDURE — 87081 CULTURE SCREEN ONLY: CPT

## 2017-11-08 PROCEDURE — 94760 N-INVAS EAR/PLS OXIMETRY 1: CPT

## 2017-11-08 PROCEDURE — 94640 AIRWAY INHALATION TREATMENT: CPT

## 2017-11-08 PROCEDURE — 2580000003 HC RX 258

## 2017-11-08 PROCEDURE — 36415 COLL VENOUS BLD VENIPUNCTURE: CPT

## 2017-11-08 PROCEDURE — 80053 COMPREHEN METABOLIC PANEL: CPT

## 2017-11-08 RX ORDER — OYSTER SHELL CALCIUM WITH VITAMIN D 500; 200 MG/1; [IU]/1
1 TABLET, FILM COATED ORAL 2 TIMES DAILY
COMMUNITY
End: 2018-04-30 | Stop reason: ALTCHOICE

## 2017-11-08 RX ADMIN — ATORVASTATIN CALCIUM 20 MG: 20 TABLET, FILM COATED ORAL at 10:39

## 2017-11-08 RX ADMIN — CETIRIZINE HYDROCHLORIDE 10 MG: 10 TABLET, FILM COATED ORAL at 10:39

## 2017-11-08 RX ADMIN — ALBUTEROL SULFATE 2.5 MG: 2.5 SOLUTION RESPIRATORY (INHALATION) at 15:52

## 2017-11-08 RX ADMIN — Medication 10 ML: at 19:13

## 2017-11-08 RX ADMIN — TIOTROPIUM BROMIDE INHALATION SPRAY 2 PUFF: 3.12 SPRAY, METERED RESPIRATORY (INHALATION) at 08:25

## 2017-11-08 RX ADMIN — ALBUTEROL SULFATE 2.5 MG: 2.5 SOLUTION RESPIRATORY (INHALATION) at 08:25

## 2017-11-08 RX ADMIN — GUAIFENESIN 400 MG: 100 SOLUTION ORAL at 06:16

## 2017-11-08 RX ADMIN — GUAIFENESIN 400 MG: 100 SOLUTION ORAL at 19:13

## 2017-11-08 RX ADMIN — GUAIFENESIN 400 MG: 100 SOLUTION ORAL at 12:17

## 2017-11-08 RX ADMIN — VANCOMYCIN HYDROCHLORIDE 1500 MG: 5 INJECTION, POWDER, LYOPHILIZED, FOR SOLUTION INTRAVENOUS at 20:40

## 2017-11-08 RX ADMIN — PANTOPRAZOLE SODIUM 40 MG: 40 TABLET, DELAYED RELEASE ORAL at 06:31

## 2017-11-08 RX ADMIN — OXYCODONE HYDROCHLORIDE 10 MG: 5 TABLET ORAL at 19:05

## 2017-11-08 RX ADMIN — Medication 10 ML: at 00:03

## 2017-11-08 RX ADMIN — OXYCODONE HYDROCHLORIDE 10 MG: 5 TABLET ORAL at 10:37

## 2017-11-08 RX ADMIN — DOXAZOSIN 4 MG: 4 TABLET ORAL at 20:30

## 2017-11-08 RX ADMIN — WATER 2 G: 1 INJECTION INTRAMUSCULAR; INTRAVENOUS; SUBCUTANEOUS at 06:16

## 2017-11-08 RX ADMIN — WATER 2 G: 1 INJECTION INTRAMUSCULAR; INTRAVENOUS; SUBCUTANEOUS at 19:12

## 2017-11-08 RX ADMIN — GUAIFENESIN 400 MG: 100 SOLUTION ORAL at 00:00

## 2017-11-08 RX ADMIN — BENZOCAINE AND MENTHOL 1 LOZENGE: 15; 4 LOZENGE ORAL at 13:59

## 2017-11-08 RX ADMIN — ALBUTEROL SULFATE 2.5 MG: 2.5 SOLUTION RESPIRATORY (INHALATION) at 11:57

## 2017-11-08 RX ADMIN — ALBUTEROL SULFATE 2.5 MG: 2.5 SOLUTION RESPIRATORY (INHALATION) at 19:41

## 2017-11-08 RX ADMIN — TAMSULOSIN HYDROCHLORIDE 0.4 MG: 0.4 CAPSULE ORAL at 10:39

## 2017-11-08 ASSESSMENT — PAIN DESCRIPTION - ONSET: ONSET: OTHER (COMMENT)

## 2017-11-08 ASSESSMENT — PAIN SCALES - GENERAL
PAINLEVEL_OUTOF10: 10
PAINLEVEL_OUTOF10: 10
PAINLEVEL_OUTOF10: 4
PAINLEVEL_OUTOF10: 0

## 2017-11-08 ASSESSMENT — PAIN DESCRIPTION - FREQUENCY: FREQUENCY: INTERMITTENT

## 2017-11-08 ASSESSMENT — PAIN DESCRIPTION - LOCATION: LOCATION: BACK

## 2017-11-08 NOTE — PROGRESS NOTES
Nutrition Assessment    Type and Reason for Visit: Positive Nutrition Screen (PU/non-healing wound)    Nutrition Recommendations: 1. Suggest continuing on general, NPO after MN. 2. Consider ordering Ensure Clear ONS, x 2/day. Malnutrition Assessment:  · Malnutrition Status: Meets the criteria for severe malnutrition  · Context: Chronic illness  · Findings of the 6 clinical characteristics of malnutrition (Minimum of 2 out of 6 clinical characteristics is required to make the diagnosis of moderate or severe Protein Calorie Malnutrition based on AND/ASPEN Guidelines):  1. Energy Intake-Less than or equal to 75%, greater than or equal to 1 month    2. Weight Loss-10% loss or greater,  (in 2 months)  3. Fat Loss-No significant subcutaneous fat loss, Orbital  4. Muscle Loss-No significant muscle mass loss, Temples (temporalis muscle)  5. Fluid Accumulation-Mild fluid accumulation, Extremities  6.  Strength-Not measured    Nutrition Diagnosis:   · Problem: Severe malnutrition, in context of chronic illness  · Etiology: related to Impaired respiratory function-inability to consume food     Signs and symptoms:  as evidenced by Presence of wounds, Weight loss (+14.7 kg--11.7% since 9/19/17)    Nutrition Assessment:  · Subjective Assessment: Per Pt:  +has back pain, and +appetite is not good. Noted +wt loss of +14.7 kg (11.7%) since 9/19 (125.2 kg). This is considered to be severe over past x 2 months.   · Nutrition-Focused Physical Findings: GI:  +rounded, +soft, +constipation +last BM (11/8), +passing flatus; PV:  +RLE, trace, +LLE, +2, pitting; Skin:  +PU on (L) buttocks, stage I (clean/dry/intact)  · Wound Type: Pressure Ulcer, Stage I  · Current Nutrition Therapies:  · Oral Diet Orders: General   · Oral Diet intake: % (Variable)  · Anthropometric Measures:  · Ht: 6' 1\" (185.4 cm)   · Admission Body Wt: 243 lb 9.7 oz (110.5 kg)  · Usual Body Wt: 276 lb 0.3 oz (125.2 kg) (9/19/17)  · % Weight Change: 11.7%,  x 2 months  · Ideal Body Wt: 171 lb 15.3 oz (78 kg), % Ideal Body 142%  · BMI Classification: BMI 30.0 - 34.9 Obese Class I  · Comparative Standards (Estimated Nutrition Needs):  · Estimated Daily Total Kcal: 2,500-2,650 kcal  · Estimated Daily Protein (g):  g    Estimated Intake vs Estimated Needs: Intake Improving    Nutrition Risk Level: High    Nutrition Interventions:   Continue current diet, Start ONS  Continued Inpatient Monitoring    Nutrition Evaluation:   · Evaluation: Goals set   · Goals: PO intake to meet >75% of estimated kcal/protein needs    · Monitoring: Meal Intake, Supplement Intake, Diet Tolerance, Gastric Residuals, Skin Integrity, Wound Healing, Ascites/Edema, Weight, Pertinent Labs    See Adult Nutrition Doc Flowsheet for more detail.      Electronically signed by Cordell Ba RDN, SULEMAN on 11/8/17 at 6:10 PM    Contact Number: [phone]

## 2017-11-08 NOTE — PLAN OF CARE
Problem: Falls - Risk of  Goal: Absence of falls  Outcome: Ongoing  Falling star program in place. Side rails up x2. Call light and personal belongings within reach. Continuing to maintain safe environment. Bed in lowest position and locked. Appropriate Identification armbands in place. Non-skid foot wear in place.

## 2017-11-08 NOTE — PROGRESS NOTES
YUE Gunter called and reports to hold AM eliquis for CT guided biopsy today.  She reports that she will talk to radiologist again and see when they can do biopsy since last dose of eliquis was last PM.

## 2017-11-08 NOTE — PROGRESS NOTES
Pharmacy Accuracy Service Medication History Note    The patient's list of current home medications has been reviewed. The patient's allergy list has been reviewed and updated. Source(s) of information: Self, [facility]    Based on information provided by the above source(s), I have updated the patient's home med list as described below. Please review the ACTION REQUESTED BY PHYSICIAN section of this note below for any discrepancies on current hospital orders. I changed or updated the following medications on the patient's home medication list:  Discontinued · Bactrim - completed therapy  · Omeprazole - duplicate   · Clotrimazole/Betamethasone - was not being used at [place]  · Mucinex - d/c'd at [place]  · Cetirizine - using Claritin  · Protonix - now using Prilosec     Added · Milk of Magnesium - prn daily for constipation  · Biofreeze - prn for knee/back pain     Adjusted   · Calcium/Vit D - clarified to 500/200 BID   Other Notes · Levaquin has a stop date of 11/14/17         PHYSICIAN ACTION REQUESTED  Discrepancies on current hospital orders that need to be addressed by a physician:    Medication Action Requested     Mucinex     Currently ordered as IP, but had been stopped at [place] prior to admission           Please feel free to call me with any questions about this encounter. Thank you.     Ankit Merritt PharmD  Pharmacy Medication Accuracy Review Service  Phone:  [phone]  Fax: [phone]      Electronically signed by Ankit Merritt, [address] [place] [address] on 11/8/2017 at 12:12 PM

## 2017-11-08 NOTE — CONSULTS
mL IV syringe  2 g Intravenous Q12H Raj Neri MD   2 g at 11/08/17 0616    albuterol (PROVENTIL) nebulizer solution 2.5 mg  2.5 mg Nebulization 4x Daily Ignacio Bingham MD   2.5 mg at 11/07/17 2241    atorvastatin (LIPITOR) tablet 20 mg  20 mg Oral Daily Ignacio Bingham MD        doxazosin (CARDURA) tablet 4 mg  4 mg Oral Nightly Ignacio Bingham MD   4 mg at 11/07/17 2357    oxyCODONE (ROXICODONE) immediate release tablet 10 mg  10 mg Oral Q8H PRN Ignacio Bingham MD   10 mg at 11/07/17 2154    pantoprazole (PROTONIX) tablet 40 mg  40 mg Oral QAM AC Ignacio Bingham MD   40 mg at 11/08/17 0631    tamsulosin (FLOMAX) capsule 0.4 mg  0.4 mg Oral Daily Ignacio Bingham MD        tiotropium (SPIRIVA RESPIMAT) 2.5 MCG/ACT inhaler 2 puff  2 puff Inhalation Daily Ignacio Bingham MD        sodium chloride flush 0.9 % injection 10 mL  10 mL Intravenous 2 times per day Ignacio Bingham MD   10 mL at 11/08/17 0003    sodium chloride flush 0.9 % injection 10 mL  10 mL Intravenous PRN Ignacio Bingham MD        acetaminophen (TYLENOL) tablet 650 mg  650 mg Oral Q4H PRN Ignacio Bingham MD        magnesium hydroxide (MILK OF MAGNESIA) 400 MG/5ML suspension 30 mL  30 mL Oral Daily PRN Ignacio Bingham MD        ondansetron (ZOFRAN) injection 4 mg  4 mg Intravenous Q6H PRN Ignacio Bingham MD        potassium chloride (KLOR-CON M) extended release tablet 40 mEq  40 mEq Oral PRN Ignacio Bingham MD        Or    potassium chloride 20 MEQ/15ML (10%) oral solution 40 mEq  40 mEq Oral PRN Ignacio Bingham MD        Or    potassium chloride 10 mEq/100 mL IVPB (Peripheral Line)  10 mEq Intravenous PRN Ignacio Bingham MD        apixaban (ELIQUIS) tablet 5 mg  5 mg Oral BID Ignacio Bingham MD   5 mg at 11/07/17 2357    cetirizine (ZYRTEC) tablet 10 mg  10 mg Oral Daily Ignacio Bingham MD        guaiFENesin (ROBITUSSIN) 100 MG/5ML oral solution 400 mg  400 mg Oral Q6H Ignacio Bingham MD   400 mg at 11/08/17 2476    benzocaine-menthol (CEPACOL) 1 lozenge  1 lozenge Oral Q2H PRN Ronny Vargas MD             Allergies:  Latex; Ibuprofen; Penicillins; and Cephalosporins    Social History:   History   Smoking Status    Former Smoker   Smokeless Tobacco    Not on file     History   Alcohol Use    4.2 oz/week    7 Cans of beer per week     History   Drug Use No       Family History:  History reviewed. No pertinent family history. REVIEW OF SYSTEMS:  Gen: Negative for nausea, vomiting, diarrhea, fever, chills, night sweats  Heart: Negative for HTN, palpitations, chest pain  Lungs: Negative for wheezes, asthma or SOB  GI: Negative for nausea, vomiting  Endo: Negative for diabetes  Heme: Negative for DVT       PHYSICAL EXAM:  Patient Vitals for the past 24 hrs:   BP Temp Temp src Pulse Resp SpO2 Height Weight   11/08/17 0139 139/69 98.1 °F (36.7 °C) Oral 79 16 94 % - -   11/07/17 2241 - - - - 18 93 % - -   11/07/17 2220 133/65 98.9 °F (37.2 °C) Oral 91 18 92 % 6' 1\" (1.854 m) 243 lb 9.6 oz (110.5 kg)   11/07/17 1725 (!) 161/79 - - 93 18 94 % - -   11/07/17 1527 (!) 148/53 98.3 °F (36.8 °C) - 92 18 96 % 6' 1\" (1.854 m) 250 lb (113.4 kg)     Gen: alert and oriented  Head: normorcephalic, atraumatic  Neck: supple  Heart: RRR  Lungs: No audible wheezes  Abdomen: soft  Pelvis: stable  Extremity A superficial left issue of decubitus. There is no evidence of radiculopathy or myelopathy C5 through S1. Active assisted range right and left shoulder, elbow, wrist, hand pain free. Active assisted range in his lower extremities finds crepitus and varus deformity at the knee from his known tricompartmental osteoarthritis. Well-healed arthroscopic portals. No severe pain on active assisted right or left hip, knee, ankle, foot range.     DATA:  CBC:   Lab Results   Component Value Date    WBC 9.2 11/07/2017    HGB 9.9 11/07/2017     11/07/2017     BMP:  Lab Results   Component Value Date     11/07/2017    K 4.6

## 2017-11-08 NOTE — CONSULTS
T4-T5        Past Surgical History:   Past Surgical History:   Procedure Laterality Date    APPENDECTOMY      KNEE ARTHROSCOPY Left 09/22/2017    LEFT KNEE ARTHROSCOPIC LAVAGE, synovectomy; insertion drain    PILONIDAL CYST EXCISION      SD KNEE SCOPE,DIAGNOSTIC Left 9/22/2017    LEFT KNEE ARTHROSCOPIC LAVAGE performed by Deanna Lopez MD at [address]: Allergies   Allergen Reactions    Latex Rash    Ibuprofen      Causes ankle swelling    Penicillins Other (See Comments)     Reaction as a child.     Cephalosporins Rash       Home Meds: Prescriptions Prior to Admission: levofloxacin (LEVAQUIN) 500 MG tablet, Take 500 mg by mouth daily  omeprazole (PRILOSEC) 20 MG delayed release capsule, Take 20 mg by mouth daily  tamsulosin (FLOMAX) 0.4 MG capsule, Take 0.4 mg by mouth daily  sulfamethoxazole-trimethoprim (BACTRIM DS) 800-160 MG per tablet, Take 1 tablet by mouth 2 times daily  Calcium 500-100 MG-UNIT CHEW, Take by mouth  cyclobenzaprine (FLEXERIL) 5 MG tablet, Take 5 mg by mouth 3 times daily as needed for Muscle spasms  oxyCODONE HCl (OXY-IR) 10 MG immediate release tablet, Take 10 mg by mouth every 8 hours as needed for Pain .  loratadine (CLARITIN) 10 MG capsule, Take 10 mg by mouth daily  omeprazole (PRILOSEC OTC) 20 MG tablet, Take 20 mg by mouth daily  guaiFENesin 400 MG tablet, Take 400 mg by mouth every 6 hours  albuterol (PROVENTIL) (2.5 MG/3ML) 0.083% nebulizer solution, Take 3 mLs by nebulization 4 times daily  tiotropium (SPIRIVA RESPIMAT) 2.5 MCG/ACT AERS inhaler, Inhale 2 puffs into the lungs daily  apixaban (ELIQUIS) 5 MG TABS tablet, Take 1 tablet by mouth 2 times daily  docusate sodium (COLACE, DULCOLAX) 100 MG CAPS, Take 100 mg by mouth 2 times daily  aspirin 81 MG tablet, Take 81 mg by mouth daily  atorvastatin (LIPITOR) 20 MG tablet, Take 20 mg by mouth daily  doxazosin (CARDURA) 4 MG tablet, Take 4 mg by mouth nightly  magnesium oxide (MAG-OX) 400 MG tablet, Take 400 mg by mouth daily  vitamin B-12 (CYANOCOBALAMIN) 500 MCG tablet, Take 1,000 mcg by mouth daily  cetirizine (ZYRTEC) 10 MG tablet, Take 1 tablet by mouth daily  clotrimazole-betamethasone (LOTRISONE) 1-0.05 % cream, Apply topically 2 times daily.   pantoprazole (PROTONIX) 40 MG tablet, Take 1 tablet by mouth every morning (before breakfast)    Hospital Meds:   Current Facility-Administered Medications   Medication Dose Route Frequency Provider Last Rate Last Dose    ceFEPIme (MAXIPIME) 2 g in sterile water 20 mL IV syringe  2 g Intravenous Q12H Darrion Tyson MD   2 g at 11/08/17 0616    albuterol (PROVENTIL) nebulizer solution 2.5 mg  2.5 mg Nebulization 4x Daily Colby Maloney MD   2.5 mg at 11/08/17 0825    atorvastatin (LIPITOR) tablet 20 mg  20 mg Oral Daily Colby Maloney MD   20 mg at 11/08/17 1039    doxazosin (CARDURA) tablet 4 mg  4 mg Oral Nightly Colby Maloney MD   4 mg at 11/07/17 2357    oxyCODONE (ROXICODONE) immediate release tablet 10 mg  10 mg Oral Q8H PRN Colby Maloney MD   10 mg at 11/08/17 1037    pantoprazole (PROTONIX) tablet 40 mg  40 mg Oral QAM AC Colby Maloney MD   40 mg at 11/08/17 0631    tamsulosin (FLOMAX) capsule 0.4 mg  0.4 mg Oral Daily Colby Maloney MD   0.4 mg at 11/08/17 1039    tiotropium (SPIRIVA RESPIMAT) 2.5 MCG/ACT inhaler 2 puff  2 puff Inhalation Daily Colby Maloney MD   2 puff at 11/08/17 0825    sodium chloride flush 0.9 % injection 10 mL  10 mL Intravenous 2 times per day Colby Maloney MD   10 mL at 11/08/17 0003    sodium chloride flush 0.9 % injection 10 mL  10 mL Intravenous PRN Colby Maloney MD        acetaminophen (TYLENOL) tablet 650 mg  650 mg Oral Q4H PRN Colby Maloney MD        magnesium hydroxide (MILK OF MAGNESIA) 400 MG/5ML suspension 30 mL  30 mL Oral Daily PRN Colby Maloney MD        ondansetron (ZOFRAN) injection 4 mg  4 mg Intravenous Q6H PRN Colby Maloney MD        potassium chloride (KLOR-CON M) extended Intake                0 ml   Output              825 ml   Net             -825 ml       General Appearance: awake, alert and NAD  Head: Normocephalic, without obvious abnormality, atraumatic  Eyes: conjunctivae/corneas clear. PERRL, EOM's intact. ENT: no neck nodes or sinus tenderness  Neck: no adenopathy, no carotid bruit, no JVD, supple, symmetrical, trachea midline and thyroid not enlarged, symmetric, no tenderness/mass/nodules  Lungs: coarse bilaterally with fine end-expiratory wheezing  Heart: regular rate and rhythm, S1, S2 normal, no murmur, click, rub or gallop  Abdomen: soft, non-tender; bowel sounds normal; no masses,  no organomegaly  Extremities: atraumatic, no cyanosis but trace bilateral LE pitting edema  Skin: normal  Neurologic: Grossly normal    DIAGNOSTICS  Studies  Telemetry: SR  EKG: SR  Echocardiogram: EF 55%, LVH (m), LA mildly dilated     Xr Chest Standard (2 Vw)    Result Date: 11/7/2017  EXAMINATION: TWO VIEWS OF THE CHEST 11/7/2017 6:32 pm COMPARISON: 09/26/2017 HISTORY: ORDERING SYSTEM PROVIDED HISTORY: Chest Pain TECHNOLOGIST PROVIDED HISTORY: Reason for exam:->Chest Pain Ordering Physician Provided Reason for Exam: chest pain Acuity: Acute Type of Exam: Initial Relevant Medical/Surgical History: COPD FINDINGS: Frontal and lateral views of the chest are submitted for review. The cardiac silhouette is enlarged. Bilateral lower lobe opacities identified, likely a combination of pleural effusions and atelectasis. Moderate central vascular congestion noted without evidence for pneumothorax. Osseous structures and soft tissues are grossly stable. Multilevel degenerative endplate spurring of the thoracic spine. Cardiomegaly and pulmonary vascular congestion. Bilateral pleural effusions and adjacent airspace disease.      Ct Thoracic Spine Wo Contrast    Result Date: 11/7/2017  EXAMINATION: CT OF THE THORACIC SPINE WITHOUT CONTRAST  11/7/2017 4:01 pm: TECHNIQUE: CT of the thoracic 09/26/2017    CO2 27 11/08/2017    CO2 25 11/07/2017    CO2 27 09/26/2017    BUN 7 (L) 11/08/2017    BUN 11 11/07/2017    BUN 22 09/26/2017    CREATININE 0.54 (L) 11/08/2017    CREATININE 0.59 (L) 11/07/2017    CREATININE 0.60 (L) 09/26/2017    CALCIUM 8.4 (L) 11/08/2017    CALCIUM 8.8 11/07/2017    CALCIUM 8.1 (L) 09/26/2017    LABALBU 2.5 (L) 11/08/2017    LABALBU 2.9 (L) 11/07/2017    LABALBU 2.4 (L) 09/26/2017    PROT 6.6 11/08/2017    PROT 7.6 11/07/2017    PROT 6.0 (L) 09/26/2017    BILITOT 0.43 11/08/2017    BILITOT 0.49 11/07/2017    BILITOT 1.35 (H) 09/26/2017    ALKPHOS 84 11/08/2017    ALKPHOS 90 11/07/2017    ALKPHOS 48 09/26/2017    ALT 38 11/08/2017    ALT 53 (H) 11/07/2017    ALT 48 (H) 09/26/2017    AST 24 11/08/2017    AST 47 (H) 11/07/2017    AST 23 09/26/2017    GLUCOSE 109 (H) 11/08/2017    GLUCOSE 130 (H) 11/07/2017    GLUCOSE 134 (H) 09/26/2017     Lab Results   Component Value Date    WBC 7.4 11/08/2017    WBC 9.2 11/07/2017    WBC 11.0 09/26/2017    HGB 8.8 (L) 11/08/2017    HGB 9.9 (L) 11/07/2017    HGB 13.9 09/26/2017    HCT 27.8 (L) 11/08/2017    HCT 29.4 (L) 11/07/2017    HCT 41.8 09/26/2017     11/08/2017     11/07/2017     09/26/2017     Lab Results   Component Value Date    MG 2.0 09/19/2017     Lab Results   Component Value Date    CKTOTAL 539 (H) 09/19/2017    CKMB 4.9 09/19/2017    TROPONINT <0.03 11/07/2017    TROPONINT <0.03 09/19/2017    TROPONINT <0.03 09/19/2017     Lab Results   Component Value Date    PROBNP 203 11/07/2017    PROBNP 2,309 (H) 09/19/2017     Lab Results   Component Value Date    INR 1.1 09/20/2017    INR 1.1 09/19/2017    APTT 27.5 09/20/2017    APTT 28.8 09/19/2017    TSH 0.80 09/19/2017       Patient Active Problem List   Diagnosis    AAA (abdominal aortic aneurysm) without rupture (HCC)    Primary osteoarthritis of left knee    Knee effusion, left    Staphylococcal septicemia (HCC)    Lumbar and sacral osteoarthritis    Lumbar judy An M.D.

## 2017-11-08 NOTE — CARE COORDINATION
Discharge planning    Informed that patient is from [facility]. LAINE placed in chart and will have social work investigate.

## 2017-11-08 NOTE — ED NOTES
Pt has quarter size wound to left buttocks sts ahs been there 1 month. denies pain. area dry with no redness or drainage seen.      Shae Keenan RN  11/07/17 6694

## 2017-11-08 NOTE — CONSULTS
was so severe that it was limiting his ability to ambulate. He did not have any associated fevers or chills and his daughter ended up consisting on them being brought into the emergency room for evaluation and the workup there does show multilevel discitis and osteomyelitis as well as possible pathologic fractures. There was a moderate right lower lobe consolidation and effusion noted and the patient was apparently being treated for pneumonia at the [facility]. I was asked to evaluate and help with antibiotic choice. I have personally reviewed the past medical history, past surgical history, medications, social history, and family history, and I have updated the database accordingly.   Past Medical History:     Past Medical History:   Diagnosis Date    Arthritis     Left knee     Chronic back pain     and knee pain/ sees chiropractor    COPD (chronic obstructive pulmonary disease) (HCC)     Hyperlipidemia     Lumbar stenosis     L4-L5    MSSA (methicillin susceptible Staphylococcus aureus)     Prostate enlargement     Sleep apnea     wears c-pap    Thoracic back pain     T4-T5      Past Surgical  History:     Past Surgical History:   Procedure Laterality Date    APPENDECTOMY      KNEE ARTHROSCOPY Left 09/22/2017    LEFT KNEE ARTHROSCOPIC LAVAGE, synovectomy; insertion drain    PILONIDAL CYST EXCISION      [place] KNEE SCOPE,DIAGNOSTIC Left 9/22/2017    LEFT KNEE ARTHROSCOPIC LAVAGE performed by Yong Anthony MD at [facility] OR     Medications:      [START ON 11/9/2017] tiotropium  18 mcg Inhalation Daily    cefepime  2 g Intravenous Q12H    albuterol  2.5 mg Nebulization 4x Daily    atorvastatin  20 mg Oral Daily    doxazosin  4 mg Oral Nightly    pantoprazole  40 mg Oral QAM AC    tamsulosin  0.4 mg Oral Daily    sodium chloride flush  10 mL Intravenous 2 times per day    apixaban  5 mg Oral BID    cetirizine  10 mg Oral Daily    guaiFENesin  400 mg Oral Q6H     Social History:     Social History Social History    Marital status:      Spouse name: N/A    Number of children: N/A    Years of education: N/A     Occupational History    Not on file. Social History Main Topics    Smoking status: Former Smoker    Smokeless tobacco: Not on file    Alcohol use 4.2 oz/week     7 Cans of beer per week    Drug use: No    Sexual activity: Not on file     Other Topics Concern    Not on file     Social History Narrative    No narrative on file     Family History:   History reviewed. No pertinent family history. Allergies:   Latex; Ibuprofen; Penicillins; and Cephalosporins     Review of Systems:   General: No fevers or chills. Eyes: No double vision or blurry vision. ENT: No sore throat or runny nose. Cardiovascular: No chest pain or palpitations. Lung: No shortness of breath or cough. Abdomen: No nausea, vomiting, diarrhea, or abdominal pain. Genitourinary: No increased urinary frequency, or dysuria. Musculoskeletal:   Hematologic: No bleeding or bruising. Neurologic: No headache, weakness, numbness, or tingling. Physical Examination :   BP (!) 134/58   Pulse 84   Temp 97.9 °F (36.6 °C) (Oral)   Resp 18   Ht 6' 1\" (1.854 m)   Wt 243 lb 9.6 oz (110.5 kg)   SpO2 96%   BMI 32.14 kg/m²     Temperature Range: Temp: 97.9 °F (36.6 °C) Temp  Av.1 °F (36.7 °C)  Min: 97.7 °F (36.5 °C)  Max: 98.9 °F (37.2 °C)  General Appearance: Awake, alert, and in no apparent distress  Head: Normocephalic, without obvious abnormality, atraumatic  Eyes: Pupils equal, round, reactive, to light and accommodation; extraocular movements intact; sclera anicteric; conjunctivae pink  ENT: Oropharynx clear, without erythema, exudate, or thrush. Neck: Supple, without lymphadenopathy. Pulmonary/Chest: Clear to auscultation, without wheezes, rales, or rhonchi  Cardiovascular: Regular rate and rhythm without murmurs, rubs, or gallops.    Abdomen: soft, non-tender, no masses, no organomegaly, distention present- diffusely and Soft, nontender, nondistended. Extremities: No cyanosis, clubbing, edema, small left knee effusion. Neurologic: No gross sensory or motor deficits.     Skin: Left gluteal stage 3 ulcer          Medical Decision Making:   I have independently reviewed/ordered the following labs:  CBC with Differential:   Recent Labs      11/07/17   1552  11/08/17   0646   WBC  9.2  7.4   HGB  9.9*  8.8*   HCT  29.4*  27.8*   PLT  303  304   LYMPHOPCT  7  8   MONOPCT  7  10     BMP:   Recent Labs      11/07/17   1552  11/08/17   0646   NA  130*  133*   K  4.6  3.9   CL  91*  96*   CO2  25  27   BUN  11  7*   CREATININE  0.59*  0.54*     Hepatic Function Panel:   Recent Labs      11/07/17   1552  11/08/17   0646   PROT  7.6  6.6   LABALBU  2.9*  2.5*   BILIDIR  0.11   --    IBILI  0.38   --    BILITOT  0.49  0.43   ALKPHOS  90  84   ALT  53*  38   AST  47*  24     Lab Results   Component Value Date    .6 (H) 09/21/2017     Lab Results   Component Value Date    SEDRATE 77 (H) 09/21/2017       Imaging Studies:   CT OF THE THORACIC SPINE WITHOUT CONTRAST  11/7/2017 4:01 pm:  Impression   Multilevel discitis and possible pathologic fractures.  Osteomyelitis is   suspected.  Metastatic disease less likely.  Probable pathologic fractures   but no loss of height at this time or retropulsion.  No involvement of the   posterior elements.  Follow-up MRI with gadolinium recommended to exclude   paraspinal or epidural abscess.       Moderate right effusion and right lower lobe consolidation.       RECOMMENDATIONS:   The findings were sent to the Radiology Results Communication [facility] at 4:37   pm on 11/7/2017to be communicated to a licensed caregiver. Shabbir Chopra discussed   with Shadia Boss nurse practitioner at 4:42 p.m.         TWO VIEWS OF THE CHEST 11/7/2017 6:32 pm  Impression   Cardiomegaly and pulmonary vascular congestion.  Bilateral pleural effusions   and adjacent airspace disease.           Cultures:

## 2017-11-08 NOTE — CONSULTS
Vancomycin trough prior to 4th dose ( 0700 on 11/10/17)      Thank you for the consult. Will continue to follow.

## 2017-11-08 NOTE — PROGRESS NOTES
Dr. Mimi Frances paged concerning IR request to hold Eliquis doses today and in AM for IR procedure.

## 2017-11-09 LAB
ABSOLUTE EOS #: 0.4 K/UL (ref 0–0.4)
ABSOLUTE IMMATURE GRANULOCYTE: ABNORMAL K/UL (ref 0–0.3)
ABSOLUTE LYMPH #: 0.7 K/UL (ref 1–4.8)
ABSOLUTE MONO #: 0.8 K/UL (ref 0.2–0.8)
ANION GAP SERPL CALCULATED.3IONS-SCNC: 10 MMOL/L (ref 9–17)
BASOPHILS # BLD: 1 %
BASOPHILS ABSOLUTE: 0 K/UL (ref 0–0.2)
BUN BLDV-MCNC: 9 MG/DL (ref 8–23)
BUN/CREAT BLD: 16 (ref 9–20)
CALCIUM SERPL-MCNC: 8.6 MG/DL (ref 8.6–10.4)
CHLORIDE BLD-SCNC: 99 MMOL/L (ref 98–107)
CO2: 26 MMOL/L (ref 20–31)
CREAT SERPL-MCNC: 0.55 MG/DL (ref 0.7–1.2)
CULTURE: ABNORMAL
CULTURE: NORMAL
DIFFERENTIAL TYPE: ABNORMAL
DIRECT EXAM: ABNORMAL
EOSINOPHILS RELATIVE PERCENT: 5 %
GFR AFRICAN AMERICAN: >60 ML/MIN
GFR NON-AFRICAN AMERICAN: >60 ML/MIN
GFR SERPL CREATININE-BSD FRML MDRD: ABNORMAL ML/MIN/{1.73_M2}
GFR SERPL CREATININE-BSD FRML MDRD: ABNORMAL ML/MIN/{1.73_M2}
GLUCOSE BLD-MCNC: 118 MG/DL (ref 70–99)
HCT VFR BLD CALC: 29.6 % (ref 41–53)
HEMOGLOBIN: 9.5 G/DL (ref 13.5–17.5)
IMMATURE GRANULOCYTES: ABNORMAL %
INR BLD: 1.2
LYMPHOCYTES # BLD: 9 %
Lab: ABNORMAL
Lab: NORMAL
MCH RBC QN AUTO: 26.1 PG (ref 26–34)
MCHC RBC AUTO-ENTMCNC: 32.3 G/DL (ref 31–37)
MCV RBC AUTO: 80.9 FL (ref 80–100)
MONOCYTES # BLD: 11 %
MRSA, DNA, NASAL: NORMAL
PARTIAL THROMBOPLASTIN TIME: 33 SEC (ref 23–31)
PDW BLD-RTO: 15.4 % (ref 11.5–14.5)
PLATELET # BLD: 347 K/UL (ref 130–400)
PLATELET ESTIMATE: ABNORMAL
PMV BLD AUTO: ABNORMAL FL (ref 6–12)
POTASSIUM SERPL-SCNC: 3.9 MMOL/L (ref 3.7–5.3)
PREALBUMIN: 7.2 MG/DL (ref 20–40)
PROTHROMBIN TIME: 12.2 SEC (ref 9.7–11.6)
RBC # BLD: 3.66 M/UL (ref 4.5–5.9)
RBC # BLD: ABNORMAL 10*6/UL
SEG NEUTROPHILS: 74 %
SEGMENTED NEUTROPHILS ABSOLUTE COUNT: 5.6 K/UL (ref 1.8–7.7)
SODIUM BLD-SCNC: 135 MMOL/L (ref 135–144)
SPECIMEN DESCRIPTION: ABNORMAL
SPECIMEN DESCRIPTION: NORMAL
STATUS: ABNORMAL
STATUS: NORMAL
WBC # BLD: 7.5 K/UL (ref 3.5–11)
WBC # BLD: ABNORMAL 10*3/UL

## 2017-11-09 PROCEDURE — 36415 COLL VENOUS BLD VENIPUNCTURE: CPT

## 2017-11-09 PROCEDURE — 6370000000 HC RX 637 (ALT 250 FOR IP): Performed by: INTERNAL MEDICINE

## 2017-11-09 PROCEDURE — 94640 AIRWAY INHALATION TREATMENT: CPT

## 2017-11-09 PROCEDURE — 99211 OFF/OP EST MAY X REQ PHY/QHP: CPT

## 2017-11-09 PROCEDURE — 85610 PROTHROMBIN TIME: CPT

## 2017-11-09 PROCEDURE — 2700000000 HC OXYGEN THERAPY PER DAY

## 2017-11-09 PROCEDURE — 97162 PT EVAL MOD COMPLEX 30 MIN: CPT

## 2017-11-09 PROCEDURE — 99232 SBSQ HOSP IP/OBS MODERATE 35: CPT | Performed by: INTERNAL MEDICINE

## 2017-11-09 PROCEDURE — 85730 THROMBOPLASTIN TIME PARTIAL: CPT

## 2017-11-09 PROCEDURE — 97530 THERAPEUTIC ACTIVITIES: CPT

## 2017-11-09 PROCEDURE — 2580000003 HC RX 258: Performed by: INTERNAL MEDICINE

## 2017-11-09 PROCEDURE — 94760 N-INVAS EAR/PLS OXIMETRY 1: CPT

## 2017-11-09 PROCEDURE — 6360000002 HC RX W HCPCS

## 2017-11-09 PROCEDURE — 84134 ASSAY OF PREALBUMIN: CPT

## 2017-11-09 PROCEDURE — 6360000002 HC RX W HCPCS: Performed by: INTERNAL MEDICINE

## 2017-11-09 PROCEDURE — 1200000000 HC SEMI PRIVATE

## 2017-11-09 PROCEDURE — 2580000003 HC RX 258

## 2017-11-09 PROCEDURE — G8978 MOBILITY CURRENT STATUS: HCPCS

## 2017-11-09 PROCEDURE — 80048 BASIC METABOLIC PNL TOTAL CA: CPT

## 2017-11-09 PROCEDURE — G8979 MOBILITY GOAL STATUS: HCPCS

## 2017-11-09 PROCEDURE — 85025 COMPLETE CBC W/AUTO DIFF WBC: CPT

## 2017-11-09 RX ADMIN — ALBUTEROL SULFATE 2.5 MG: 2.5 SOLUTION RESPIRATORY (INHALATION) at 20:39

## 2017-11-09 RX ADMIN — GUAIFENESIN 400 MG: 100 SOLUTION ORAL at 00:07

## 2017-11-09 RX ADMIN — GUAIFENESIN 400 MG: 100 SOLUTION ORAL at 18:17

## 2017-11-09 RX ADMIN — VANCOMYCIN HYDROCHLORIDE 1500 MG: 5 INJECTION, POWDER, LYOPHILIZED, FOR SOLUTION INTRAVENOUS at 08:49

## 2017-11-09 RX ADMIN — Medication 10 ML: at 08:52

## 2017-11-09 RX ADMIN — ATORVASTATIN CALCIUM 20 MG: 20 TABLET, FILM COATED ORAL at 08:50

## 2017-11-09 RX ADMIN — ALBUTEROL SULFATE 2.5 MG: 2.5 SOLUTION RESPIRATORY (INHALATION) at 14:54

## 2017-11-09 RX ADMIN — VANCOMYCIN HYDROCHLORIDE 1500 MG: 5 INJECTION, POWDER, LYOPHILIZED, FOR SOLUTION INTRAVENOUS at 21:13

## 2017-11-09 RX ADMIN — ALBUTEROL SULFATE 2.5 MG: 2.5 SOLUTION RESPIRATORY (INHALATION) at 07:25

## 2017-11-09 RX ADMIN — WATER 2 G: 1 INJECTION INTRAMUSCULAR; INTRAVENOUS; SUBCUTANEOUS at 05:42

## 2017-11-09 RX ADMIN — DOXAZOSIN 4 MG: 4 TABLET ORAL at 21:20

## 2017-11-09 RX ADMIN — WATER 2 G: 1 INJECTION INTRAMUSCULAR; INTRAVENOUS; SUBCUTANEOUS at 18:17

## 2017-11-09 RX ADMIN — TAMSULOSIN HYDROCHLORIDE 0.4 MG: 0.4 CAPSULE ORAL at 08:50

## 2017-11-09 RX ADMIN — BENZOCAINE AND MENTHOL 1 LOZENGE: 15; 4 LOZENGE ORAL at 08:49

## 2017-11-09 RX ADMIN — ALBUTEROL SULFATE 2.5 MG: 2.5 SOLUTION RESPIRATORY (INHALATION) at 10:55

## 2017-11-09 RX ADMIN — OXYCODONE HYDROCHLORIDE 10 MG: 5 TABLET ORAL at 21:30

## 2017-11-09 RX ADMIN — TIOTROPIUM BROMIDE 18 MCG: 18 CAPSULE ORAL; RESPIRATORY (INHALATION) at 07:25

## 2017-11-09 RX ADMIN — HYDROMORPHONE HYDROCHLORIDE 0.5 MG: 1 INJECTION, SOLUTION INTRAMUSCULAR; INTRAVENOUS; SUBCUTANEOUS at 13:09

## 2017-11-09 RX ADMIN — OXYCODONE HYDROCHLORIDE 10 MG: 5 TABLET ORAL at 05:46

## 2017-11-09 RX ADMIN — CETIRIZINE HYDROCHLORIDE 10 MG: 10 TABLET, FILM COATED ORAL at 08:51

## 2017-11-09 ASSESSMENT — PAIN DESCRIPTION - DESCRIPTORS: DESCRIPTORS: BURNING

## 2017-11-09 ASSESSMENT — PAIN SCALES - GENERAL
PAINLEVEL_OUTOF10: 10
PAINLEVEL_OUTOF10: 6

## 2017-11-09 ASSESSMENT — PAIN DESCRIPTION - PAIN TYPE: TYPE: ACUTE PAIN

## 2017-11-09 ASSESSMENT — PAIN DESCRIPTION - FREQUENCY: FREQUENCY: INTERMITTENT

## 2017-11-09 NOTE — PROGRESS NOTES
sign.  No cords or calf tenderness. Additional exam:     Data Review  CBC:   Recent Labs      11/07/17   1552  11/08/17   0646  11/09/17   0707   WBC  9.2  7.4  7.5   HGB  9.9*  8.8*  9.5*   PLT  303  304  347     BMP:  Recent Labs      11/07/17   1552  11/08/17   0646  11/09/17   0707   NA  130*  133*  135   K  4.6  3.9  3.9   CL  91*  96*  99   CO2  25  27  26   BUN  11  7*  9   CREATININE  0.59*  0.54*  0.55*   GLUCOSE  130*  109*  118*     Hepatic: Recent Labs      11/07/17   1552  11/08/17   0646   AST  47*  24   ALT  53*  38   BILITOT  0.49  0.43   ALKPHOS  90  84     Troponin: No results for input(s): TROPONINI in the last 72 hours. BNP: No results for input(s): BNP in the last 72 hours. Lipids: No results for input(s): CHOL, HDL in the last 72 hours. Invalid input(s): LDLCALCU  INR:   Recent Labs      11/09/17   0738   INR  1.2         Assessment:   Allyson Jhaveri is unchanged from yesterday. Pain is well controlled. He has no nausea and no vomiting. Current activity is up with assistance  Incision:       Plan:      1:  Case discussed with patient and family at bedside. They are at this time not interested in proceeding with definitive surgery. Case was also discussed with infectious disease. Planning 6 more weeks of IV antibiotics. Should pain remain recalcitrant, abscess developed, neurological findings develop, he would benefit from surgery characterized by thoracotomy, anterior thoracic discectomy, autografting, anterior thoracic fusion.   2:  Continue Deep venous thrombosis prophylaxis  3:  Continue Pain Control        Electronically signed by Tena Law MD on 11/9/2017 at 3:49 PM

## 2017-11-09 NOTE — PROGRESS NOTES
[facility]   Section of Cardiology  Progress Note          Date:  11/9/2017  Patient: Aroldo Gaston  Admission:  11/7/2017  3:17 PM                           LOS: 2 days   Admit DX: Osteomyelitis ([address] [M86.9]  Age:  76 y. o., 1940          REASON OF EVALUATION: anticoagulation management      SUBJECTIVE     The patient was seen and examined. Notes and labs reviewed. There were not complications over night. Patient slept well last night and notes improvement in upper back pain. Plans for IR today with CT guided aspiration of thoracic spine. Patient's cardiac review of systems: negative. The patient is generally feeling stable. Patient denies any chest pain, sob, palpitations, dizziness or syncope. Current Inpatient Medications:   tiotropium  18 mcg Inhalation Daily    vancomycin  1,500 mg Intravenous Q12H    vancomycin (VANCOCIN) intermittent dosing (placeholder)   Other RX Placeholder    cefepime  2 g Intravenous Q12H    albuterol  2.5 mg Nebulization 4x Daily    atorvastatin  20 mg Oral Daily    doxazosin  4 mg Oral Nightly    pantoprazole  40 mg Oral QAM AC    tamsulosin  0.4 mg Oral Daily    sodium chloride flush  10 mL Intravenous 2 times per day    cetirizine  10 mg Oral Daily    guaiFENesin  400 mg Oral Q6H       IV Infusions (if any):       OBJECTIVE    Vitals:    Vitals:    11/08/17 1927 11/09/17 0015 11/09/17 0728 11/09/17 0805   BP: (!) 155/78 137/64  (!) 144/63   Pulse: 92 102  78   Resp: 24 20 18 22   Temp: 98.4 °F (36.9 °C) 98.1 °F (36.7 °C)  97.7 °F (36.5 °C)   TempSrc: Oral   Oral   SpO2: 94% 93% (!) 85% 94%   Weight:       Height:             Intake/Output Summary (Last 24 hours) at 11/09/17 0841  Last data filed at 11/09/17 0513   Gross per 24 hour   Intake              370 ml   Output              200 ml   Net              170 ml       Exam:  CONSTITUTIONAL:  awake, alert, cooperative, no apparent distress, and appears stated age.   HEENT: Normal jugular venous pulsations, no carotid bruits. Head is atraumatic, normocephalic. Eyes and oral mucosa are normal.  LUNGS: Good respiratory effort On auscultation: clear to auscultation bilaterally, with fine end-expiratory wheezing  CARDIOVASCULAR:  Normal apical impulse, regular rate and rhythm, normal S1 and S2, no S3 or S4, and no murmur or rub noted. ABDOMEN: Soft, nontender, nondistended. Bowel sounds present. No masses or tenderness. No bruit. SKIN: Warm and dry. EXTREMITIES: Trace bilateral lower extremity edema. Motor movement grossly intact. No cyanosis or clubbing.       DIAGNOSTICS  Telemetry: SR  EKG: SR  Echocardiogram: EF 55%, LVH (m), LA mildly dilated       LABS   Lab Results   Component Value Date     11/09/2017     (L) 11/08/2017     (L) 11/07/2017    K 3.9 11/09/2017    K 3.9 11/08/2017    K 4.6 11/07/2017    CL 99 11/09/2017    CL 96 (L) 11/08/2017    CL 91 (L) 11/07/2017    CO2 26 11/09/2017    CO2 27 11/08/2017    CO2 25 11/07/2017    BUN 9 11/09/2017    BUN 7 (L) 11/08/2017    BUN 11 11/07/2017    CREATININE 0.55 (L) 11/09/2017    CREATININE 0.54 (L) 11/08/2017    CREATININE 0.59 (L) 11/07/2017    CALCIUM 8.6 11/09/2017    CALCIUM 8.4 (L) 11/08/2017    CALCIUM 8.8 11/07/2017    LABALBU 2.5 (L) 11/08/2017    LABALBU 2.9 (L) 11/07/2017    LABALBU 2.4 (L) 09/26/2017    PROT 6.6 11/08/2017    PROT 7.6 11/07/2017    PROT 6.0 (L) 09/26/2017    BILITOT 0.43 11/08/2017    BILITOT 0.49 11/07/2017    BILITOT 1.35 (H) 09/26/2017    ALKPHOS 84 11/08/2017    ALKPHOS 90 11/07/2017    ALKPHOS 48 09/26/2017    ALT 38 11/08/2017    ALT 53 (H) 11/07/2017    ALT 48 (H) 09/26/2017    AST 24 11/08/2017    AST 47 (H) 11/07/2017    AST 23 09/26/2017    GLUCOSE 118 (H) 11/09/2017    GLUCOSE 109 (H) 11/08/2017    GLUCOSE 130 (H) 11/07/2017     Lab Results   Component Value Date    WBC 7.5 11/09/2017    WBC 7.4 11/08/2017    WBC 9.2 11/07/2017    HGB 9.5 (L) 11/09/2017    HGB 8.8 (L) 11/08/2017    HGB 9.9 (L)

## 2017-11-09 NOTE — PROGRESS NOTES
[address] [id number] Continence Nurse  Consult Note       NAME:  Juan Manuel Lane  MEDICAL RECORD NUMBER:  [id number]  AGE: 76 y.o. GENDER: male  : 1940  TODAY'S DATE:  2017    Subjective:     Reason for WOCN Evaluation and Assessment: left buttock unstageable pressure injury present upon admission. Juan Manuel Lane is a 76 y.o. male referred by:   [] Physician  [] Nursing  [] Other:     Wound Identification:  Wound Type: pressure  Contributing Factors: chronic pressure, decreased mobility, shear force and obesity        PAST MEDICAL HISTORY        Diagnosis Date    Arthritis     Left knee     Chronic back pain     and knee pain/ sees chiropractor    COPD (chronic obstructive pulmonary disease) (HCC)     Hyperlipidemia     Lumbar stenosis     L4-L5    MSSA (methicillin susceptible Staphylococcus aureus)     Prostate enlargement     Sleep apnea     wears c-pap    Thoracic back pain     T4-T5        PAST SURGICAL HISTORY    Past Surgical History:   Procedure Laterality Date    APPENDECTOMY      KNEE ARTHROSCOPY Left 2017    LEFT KNEE ARTHROSCOPIC LAVAGE, synovectomy; insertion drain    PILONIDAL CYST EXCISION      [place] KNEE SCOPE,DIAGNOSTIC Left 2017    LEFT KNEE ARTHROSCOPIC LAVAGE performed by Efren Cruz MD at [address] [place] [address]    History reviewed. No pertinent family history. SOCIAL HISTORY    Social History   Substance Use Topics    Smoking status: Former Smoker    Smokeless tobacco: Never Used    Alcohol use 4.2 oz/week     7 Cans of beer per week       ALLERGIES    Allergies   Allergen Reactions    Latex Rash    Ibuprofen      Causes ankle swelling    Penicillins Other (See Comments)     Reaction as a child.     Cephalosporins Rash           Objective:      BP (!) 144/63   Pulse 78   Temp 97.7 °F (36.5 °C) (Oral)   Resp 22   Ht 6' 1\" (1.854 m)   Wt 243 lb 9.6 oz (110.5 kg)   SpO2 94%   BMI 32.14 kg/m²   Jose Risk Score: Jose Scale Score: Necrotic Type Black Eschar   Necrotic Amount Large: %   Drainage Amount Moderate   Drainage Description Sanguinous; Yellow   Odor None   Pink%Wound Bed 30   Black%Wound Bed 70       Response to treatment:  Well tolerated by patient. Plan:     Plan of Care: Turn every 2 hours  Float heels of of bed with pillows under calves    Paint left buttock intact rayshawn wound skin with Bard wipe, apply 1/2 of the Medihoney alginate piece to the wound, cover with a 4x4 Mepilex foam dressing. Change every 48 hours. Routine incontinence care with incontinence barrier cloths and zinc oxide cream. Apply zinc oxide cream BID and prn incontinence. Covidikelly moisture wicking under pads vs cloth backed briefs    Static air overlay. Check inflation each shift by sliding hand under the air overlay. Feel for the patient's heaviest/ most dependant body part. Ideally 1/2 to 1\" of air will be between your hand and the patient's body. Add air prn. Specialty Bed Required : Yes   [] Low Air Loss   [x] Pressure Redistribution  [] Fluid Immersion  [] Bariatric  [] Total Pressure Relief  [] Other:     Discharge Plan:  TBD    Patient/Caregiver Teaching:   patient informed of extent of his wound and cause. Encouraged to stay off of left side and turn from back to right side every 2 hours. He has difficulties due to his back pain.   [] Indicates understanding       [x] Needs reinforcement  [] Unsuccessful      [] Verbal Understanding  [] Demonstrated understanding       [] No evidence of learning  [] Refused teaching         [] N/A       Electronically signed by Alena Trotter RN, [address] [place] [address] on 11/9/2017 at 8:28 AM

## 2017-11-09 NOTE — PLAN OF CARE
Problem: Risk for Impaired Skin Integrity  Goal: Tissue integrity - skin and mucous membranes  Structural intactness and normal physiological function of skin and  mucous membranes. Outcome: Ongoing  Turn and reposition in bed, pressure reducing mattress, monitoring skin with every assessment and prn.

## 2017-11-09 NOTE — H&P
History and Physical/admit note      CHIEF COMPLAINT:  Back pain    History of Present Illness: Patient with known history of staph sepsis and osteomyelitis of back was treated with IV antibiotic for 6 weeks finished about a week ago started having severe back pain with back sharp continuous times few days rated 10 over 10 without any radiation worse with movement and better with pain pills anemia denies any fevers chills or shortness of breath, no cough no chest pains        Past Medical History:   Diagnosis Date    Arthritis     Left knee     Chronic back pain     and knee pain/ sees chiropractor    COPD (chronic obstructive pulmonary disease) (HCC)     Hyperlipidemia     Lumbar stenosis     L4-L5    MSSA (methicillin susceptible Staphylococcus aureus)     Prostate enlargement     Sleep apnea     wears c-pap    Thoracic back pain     T4-T5          Past Surgical History:   Procedure Laterality Date    APPENDECTOMY      KNEE ARTHROSCOPY Left 09/22/2017    LEFT KNEE ARTHROSCOPIC LAVAGE, synovectomy; insertion drain    PILONIDAL CYST EXCISION      [place] KNEE SCOPE,DIAGNOSTIC Left 9/22/2017    LEFT KNEE ARTHROSCOPIC LAVAGE performed by Yong Anthony MD at [address] [place] [address]       Medications Prior to Admission:    Prescriptions Prior to Admission: calcium-vitamin D (OSCAL-500) 500-200 MG-UNIT per tablet, Take 1 tablet by mouth 2 times daily  magnesium hydroxide (MILK OF MAGNESIA) 400 MG/5ML suspension, Take 30 mLs by mouth daily as needed for Constipation  Menthol, Topical Analgesic, (BIOFREEZE EX), Apply topically as needed (To knee and back for pain)  levofloxacin (LEVAQUIN) 500 MG tablet, Take 500 mg by mouth daily  omeprazole (PRILOSEC) 20 MG delayed release capsule, Take 20 mg by mouth daily  tamsulosin (FLOMAX) 0.4 MG capsule, Take 0.4 mg by mouth daily  cyclobenzaprine (FLEXERIL) 5 MG tablet, Take 5 mg by mouth 2 times daily as needed for Muscle spasms   oxyCODONE HCl (OXY-IR) 10 MG immediate release tablet, Take 10 mg by mouth every 6 hours as needed for Pain  .  loratadine (CLARITIN) 10 MG capsule, Take 10 mg by mouth daily  albuterol (PROVENTIL) (2.5 MG/3ML) 0.083% nebulizer solution, Take 3 mLs by nebulization 4 times daily  tiotropium (SPIRIVA RESPIMAT) 2.5 MCG/ACT AERS inhaler, Inhale 2 puffs into the lungs daily  apixaban (ELIQUIS) 5 MG TABS tablet, Take 1 tablet by mouth 2 times daily  docusate sodium (COLACE, DULCOLAX) 100 MG CAPS, Take 100 mg by mouth 2 times daily  aspirin 81 MG tablet, Take 81 mg by mouth daily  atorvastatin (LIPITOR) 20 MG tablet, Take 20 mg by mouth daily  doxazosin (CARDURA) 4 MG tablet, Take 4 mg by mouth nightly  magnesium oxide (MAG-OX) 400 MG tablet, Take 400 mg by mouth daily  vitamin B-12 (CYANOCOBALAMIN) 500 MCG tablet, Take 500 mcg by mouth daily   [DISCONTINUED] omeprazole (PRILOSEC OTC) 20 MG tablet, Take 20 mg by mouth daily    Allergies:    Latex; Ibuprofen; Penicillins; and Cephalosporins    Social History:    reports that he has quit smoking. He has never used smokeless tobacco. He reports that he drinks about 4.2 oz of alcohol per week . He reports that he does not use drugs. Family History:   family history is not on file.     REVIEW OF SYSTEMS:  Constitutional: negative  Eyes: negative  Ears, nose, mouth, throat, and face: negative  Respiratory: negative, Occasional shortness of breath with exertion  Cardiovascular: negative  Gastrointestinal: negative  Genitourinary:negative  Integument/breast: negative  Hematologic/lymphatic: negative  Musculoskeletal:negative, See HPI  Neurological: negative  Behavioral/Psych: negative  Endocrine: negative  Allergic/Immunologic: negative  PHYSICAL EXAM:  General Appearance: alert and oriented to person, place and time and in no acute distress  Skin: warm and dry, no rash or erythema,   decubitus stage 2 sacral area   Head: normocephalic and atraumatic  Eyes: pupils equal, round, and reactive to light, conjunctivae normal and sclera anicteric    Neck: neck supple and non tender without mass   Pulmonary/Chest: clear to auscultation bilaterally- no wheezes, rales or rhonchi, normal air movement, no respiratory distress  Cardiovascular: normal rate, regular rhythm, normal S1 and S2, no gallops, intact distal pulses and no carotid bruits  Abdomen: soft, non-tender, non-distended, normal bowel sounds, no masses or organomegaly  Extremities: no edema and good pulses no Kingston sign   Musculoskeletal: Right    Dorsal spine swelling and tenderness   Neurologic: Alert and oriented ×3 with no focal deficits  Vitals:  BP (!) 155/78   Pulse 92   Temp 98.4 °F (36.9 °C) (Oral)   Resp 24   Ht 6' 1\" (1.854 m)   Wt 243 lb 9.6 oz (110.5 kg)   SpO2 94%   BMI 32.14 kg/m²         LABS:  CBC:   Lab Results   Component Value Date    WBC 7.4 11/08/2017    RBC 3.43 11/08/2017    HGB 8.8 11/08/2017    HCT 27.8 11/08/2017    MCV 81.2 11/08/2017    MCH 25.8 11/08/2017    MCHC 31.8 11/08/2017    RDW 15.3 11/08/2017     11/08/2017    MPV NOT REPORTED 11/08/2017     CMP:    Lab Results   Component Value Date     11/08/2017    K 3.9 11/08/2017    CL 96 11/08/2017    CO2 27 11/08/2017    BUN 7 11/08/2017    CREATININE 0.54 11/08/2017    GFRAA >60 11/08/2017    LABGLOM >60 11/08/2017    GLUCOSE 109 11/08/2017    PROT 6.6 11/08/2017    LABALBU 2.5 11/08/2017    CALCIUM 8.4 11/08/2017    BILITOT 0.43 11/08/2017    ALKPHOS 84 11/08/2017    AST 24 11/08/2017    ALT 38 11/08/2017         ASSESSMENT:        Osteomyelitis of the spine   AAA without rupture   COPD   Severe malnutrition   Degenerative disc disease thoracolumbar   Paroxysmal atrial fibrillation   Sacral decubitus   Patient Active Problem List   Diagnosis    AAA (abdominal aortic aneurysm) without rupture (HCC)    Primary osteoarthritis of left knee    Knee effusion, left    Staphylococcal septicemia (HCC)    Lumbar and sacral osteoarthritis    Lumbar stenosis with neurogenic claudication    Degenerative spondylolisthesis    DDD (degenerative disc disease), thoracolumbar    COPD exacerbation (HCC)    UTI (urinary tract infection)    Elevated PSA    Staphylococcal arthritis of left knee (HCC)    Atrial fibrillation (HCC)    Acute cystitis without hematuria    Thrombocytopenia (HCC)    Severe malnutrition (HCC)    Prediabetes    Acute osteomyelitis of thoracic spine (HCC)    Thoracic discitis    Osteomyelitis of thoracic region ([address]    Decubitus ulcer of left buttock, stage 3 ([address]       PLAN:        Progressive monitor  Pain  control physical therapy and occupational therapy, hold anticoagulation template biopsies done cultures antibiotics.   Infectious disease resume all meds see orders consult infectious disease spine unremarkable and cardiology        Angela Marroquin MD  8:53 PM  11/8/2017

## 2017-11-09 NOTE — PROGRESS NOTES
Physical Therapy    Facility/Department: [facility] PROGRESSIVE CARE  Initial Assessment    NAME: Jessi Lopez  : 1940  MRN: [id number]  D/c Recommendation - SNF. Date of Service: 2017  RN reports patient is medically stable for therapy treatment this date. Chart reviewed prior to treatment and patient is agreeable for therapy. All lines intact and patient positioned comfortably at end of treatment. All patient needs addressed prior to ending therapy session. Patient Diagnosis(es): The primary encounter diagnosis was Osteomyelitis of thoracic region [facility]). A diagnosis of Pathologic compression fracture of spine, initial encounter [facility]) was also pertinent to this visit. has a past medical history of Arthritis; Chronic back pain; COPD (chronic obstructive pulmonary disease) ([address] Hyperlipidemia; Lumbar stenosis; MSSA (methicillin susceptible Staphylococcus aureus); Prostate enlargement; Sleep apnea; and Thoracic back pain. has a past surgical history that includes Pilonidal cyst excision; Appendectomy; Knee arthroscopy (Left, 2017); and knee scope,diagnostic (Left, 2017). Restrictions  Position Activity Restriction  Other position/activity restrictions: Noted osteomyelitis in spine; noted possible need for TLSO. At this time - activity order as tolerated. Will progress mobility cautiously. Vision/Hearing        Subjective  General  Chart Reviewed: Yes  Patient assessed for rehabilitation services?: Yes  Response To Previous Treatment: Not applicable  Family / Caregiver Present: Yes (Daughter )  Follows Commands: Within Functional Limits  Pain Screening  Patient Currently in Pain: Yes  Pain Assessment  Pain Assessment: 0-10  Pain Level: 6  Pain Type: Acute pain  Pain Descriptors: Burning  Pain Frequency: Intermittent  Vital Signs  Patient Currently in Pain: Yes  Pre Treatment Pain Screening  Comments / Details: Pain increased with activity.   RN informed and RN following up on

## 2017-11-09 NOTE — PROGRESS NOTES
physical therapy as the back pain was so severe that it was limiting his ability to ambulate. He did not have any associated fevers or chills and his daughter ended up consisting on them being brought into the emergency room for evaluation and the workup there does show multilevel discitis and osteomyelitis as well as possible pathologic fractures. There was a moderate right lower lobe consolidation and effusion noted and the patient was apparently being treated for pneumonia at the [facility]. I was asked to evaluate and help with antibiotic choice. Current evaluation: 2017  Patient seen and evaluated at bedside with daughter. Patient states he feels better, though continues to complain of low back pain. No fevers or chills. No nausea or vomiting. No diarrhea. Daughter expressed that she would ultimately prefer to have IV antibiotic therapy completed at home once patient is strong enough. Physical Examination :   BP (!) 144/63   Pulse 78   Temp 97.7 °F (36.5 °C) (Oral)   Resp 22   Ht 6' 1\" (1.854 m)   Wt 243 lb 9.6 oz (110.5 kg)   SpO2 96%   BMI 32.14 kg/m²     Temperature Range: Temp: 97.7 °F (36.5 °C) Temp  Av °F (36.7 °C)  Min: 97.7 °F (36.5 °C)  Max: 98.4 °F (36.9 °C)  Physical Exam   Constitutional: He is oriented to person, place, and time. He appears well-developed and well-nourished. HENT:   Head: Normocephalic and atraumatic. Neck: Normal range of motion. Neck supple. Cardiovascular: Normal rate and regular rhythm. Pulmonary/Chest: Effort normal and breath sounds normal.   Abdominal: Soft. Bowel sounds are normal.   Musculoskeletal:   Small left knee effusion, warmth, no erythema. Neurological: He is alert and oriented to person, place, and time. Skin: Skin is warm and dry.          Laboratory data:   I have independently reviewed the following labs:  CBC with Differential:   Recent Labs      17   0646  17   0707   WBC  7.4  7.5   HGB  8.8*  9.5*   HCT  27.8* PRESUMPTIVE JEAN-CLAUDE ALBICANS HEAVY GROWTH (A)    Culture NORMAL SKIN JOB (A)    Culture Performed at [facility] [address] [facility], [address] [place] [address] [phone]    Status FINAL 11/09/2017   MRSA SCREENING CULTURE ONLY [[id number]] Collected: 11/08/17 2228   Order Status: Completed Specimen: Nares Updated: 11/09/17 0027    Specimen Description . NARES Performed at [address] [place] [address] [address] [place]    Specimen Description  [id number] [phone]    Special Requests NOT REPORTED    Culture DUE TO THE SPECIMEN TYPE, THE ORDER WAS CANCELED AND REORDERED. PLEASE REFER TO:    Culture [id number]    Culture Performed at [facility] [address] [facility], [address] [place] [address] [phone]    Status FINAL 11/09/2017   Urine culture [[id number]] Collected: 11/07/17 2126   Order Status: Completed Specimen: Urine voided Updated: 11/08/17 2313    Specimen Description . CLEAN CATCH URINE Performed at [address] [place] [address] [facility]    Specimen Description [place], [address] [facility] [phone]    Special Requests NOT REPORTED    Culture NO SIGNIFICANT GROWTH    Culture Performed at [facility] [address] [facility], [address] [place] [address] [phone]    Status FINAL 11/08/2017         Medications:      tiotropium  18 mcg Inhalation Daily    vancomycin  1,500 mg Intravenous Q12H    vancomycin (VANCOCIN) intermittent dosing (placeholder)   Other RX Placeholder    cefepime  2 g Intravenous Q12H    albuterol  2.5 mg Nebulization 4x Daily    atorvastatin  20 mg Oral Daily    doxazosin  4 mg Oral Nightly    pantoprazole  40 mg Oral QAM AC    tamsulosin  0.4 mg Oral Daily    sodium chloride flush  10 mL Intravenous 2 times per day    cetirizine  10 mg Oral Daily    guaiFENesin  400 mg Oral Q6H     Thank you for allowing us to participate in the care of this patient. Please call with questions.     Nidia Slater CNP  Perfect [employer] messaging: [phone]    I have discussed the care of Kwan Figueroa Radha Frazier including pertinent history and exam findings,  with the NP. I have seen and examined the patient and the key elements of all parts of the encounter have been performed by me. I agree with the assessment, plan and orders as documented by the NP. Prosper Sena M.D.   Perfect [employer] messaging [phone]

## 2017-11-10 ENCOUNTER — APPOINTMENT (OUTPATIENT)
Dept: CT IMAGING | Age: 77
DRG: 477 | End: 2017-11-10
Payer: MEDICARE

## 2017-11-10 LAB
ABSOLUTE EOS #: 0.6 K/UL (ref 0–0.4)
ABSOLUTE IMMATURE GRANULOCYTE: ABNORMAL K/UL (ref 0–0.3)
ABSOLUTE LYMPH #: 0.7 K/UL (ref 1–4.8)
ABSOLUTE MONO #: 0.7 K/UL (ref 0.2–0.8)
ANION GAP SERPL CALCULATED.3IONS-SCNC: 13 MMOL/L (ref 9–17)
BASOPHILS # BLD: 0 %
BASOPHILS ABSOLUTE: 0 K/UL (ref 0–0.2)
BUN BLDV-MCNC: 8 MG/DL (ref 8–23)
BUN/CREAT BLD: 15 (ref 9–20)
CALCIUM SERPL-MCNC: 8.5 MG/DL (ref 8.6–10.4)
CHLORIDE BLD-SCNC: 97 MMOL/L (ref 98–107)
CO2: 26 MMOL/L (ref 20–31)
CREAT SERPL-MCNC: 0.52 MG/DL (ref 0.7–1.2)
DIFFERENTIAL TYPE: ABNORMAL
EOSINOPHILS RELATIVE PERCENT: 8 %
FERRITIN: 1153 UG/L (ref 30–400)
FOLATE: 6.8 NG/ML
GFR AFRICAN AMERICAN: >60 ML/MIN
GFR NON-AFRICAN AMERICAN: >60 ML/MIN
GFR SERPL CREATININE-BSD FRML MDRD: ABNORMAL ML/MIN/{1.73_M2}
GFR SERPL CREATININE-BSD FRML MDRD: ABNORMAL ML/MIN/{1.73_M2}
GLUCOSE BLD-MCNC: 116 MG/DL (ref 70–99)
HCT VFR BLD CALC: 30.8 % (ref 41–53)
HEMOGLOBIN: 10.1 G/DL (ref 13.5–17.5)
IMMATURE GRANULOCYTES: ABNORMAL %
IRON: 17 UG/DL (ref 59–158)
LYMPHOCYTES # BLD: 10 %
MCH RBC QN AUTO: 26.6 PG (ref 26–34)
MCHC RBC AUTO-ENTMCNC: 32.9 G/DL (ref 31–37)
MCV RBC AUTO: 80.9 FL (ref 80–100)
MONOCYTES # BLD: 9 %
PDW BLD-RTO: 16.2 % (ref 11.5–14.5)
PLATELET # BLD: 342 K/UL (ref 130–400)
PLATELET ESTIMATE: ABNORMAL
PMV BLD AUTO: 7.7 FL (ref 6–12)
POTASSIUM SERPL-SCNC: 3.9 MMOL/L (ref 3.7–5.3)
RBC # BLD: 3.8 M/UL (ref 4.5–5.9)
RBC # BLD: ABNORMAL 10*6/UL
SEG NEUTROPHILS: 73 %
SEGMENTED NEUTROPHILS ABSOLUTE COUNT: 5.8 K/UL (ref 1.8–7.7)
SODIUM BLD-SCNC: 136 MMOL/L (ref 135–144)
VANCOMYCIN TROUGH DATE LAST DOSE: NORMAL
VANCOMYCIN TROUGH DOSE AMOUNT: NORMAL
VANCOMYCIN TROUGH TIME LAST DOSE: NORMAL
VANCOMYCIN TROUGH: 17.3 UG/ML (ref 10–20)
VITAMIN B-12: 1342 PG/ML (ref 211–946)
WBC # BLD: 7.8 K/UL (ref 3.5–11)
WBC # BLD: ABNORMAL 10*3/UL

## 2017-11-10 PROCEDURE — G8987 SELF CARE CURRENT STATUS: HCPCS

## 2017-11-10 PROCEDURE — 6360000002 HC RX W HCPCS: Performed by: INTERNAL MEDICINE

## 2017-11-10 PROCEDURE — 6370000000 HC RX 637 (ALT 250 FOR IP): Performed by: INTERNAL MEDICINE

## 2017-11-10 PROCEDURE — 86403 PARTICLE AGGLUT ANTBDY SCRN: CPT

## 2017-11-10 PROCEDURE — 6360000002 HC RX W HCPCS: Performed by: RADIOLOGY

## 2017-11-10 PROCEDURE — 88311 DECALCIFY TISSUE: CPT

## 2017-11-10 PROCEDURE — 87186 SC STD MICRODIL/AGAR DIL: CPT

## 2017-11-10 PROCEDURE — 85025 COMPLETE CBC W/AUTO DIFF WBC: CPT

## 2017-11-10 PROCEDURE — 94640 AIRWAY INHALATION TREATMENT: CPT

## 2017-11-10 PROCEDURE — 36415 COLL VENOUS BLD VENIPUNCTURE: CPT

## 2017-11-10 PROCEDURE — 97166 OT EVAL MOD COMPLEX 45 MIN: CPT

## 2017-11-10 PROCEDURE — 97530 THERAPEUTIC ACTIVITIES: CPT

## 2017-11-10 PROCEDURE — 87075 CULTR BACTERIA EXCEPT BLOOD: CPT

## 2017-11-10 PROCEDURE — 2580000003 HC RX 258: Performed by: INTERNAL MEDICINE

## 2017-11-10 PROCEDURE — 6360000002 HC RX W HCPCS

## 2017-11-10 PROCEDURE — 99232 SBSQ HOSP IP/OBS MODERATE 35: CPT | Performed by: INTERNAL MEDICINE

## 2017-11-10 PROCEDURE — 88307 TISSUE EXAM BY PATHOLOGIST: CPT

## 2017-11-10 PROCEDURE — 87176 TISSUE HOMOGENIZATION CULTR: CPT

## 2017-11-10 PROCEDURE — 83540 ASSAY OF IRON: CPT

## 2017-11-10 PROCEDURE — 87070 CULTURE OTHR SPECIMN AEROBIC: CPT

## 2017-11-10 PROCEDURE — 0PB43ZX EXCISION OF THORACIC VERTEBRA, PERCUTANEOUS APPROACH, DIAGNOSTIC: ICD-10-PCS | Performed by: RADIOLOGY

## 2017-11-10 PROCEDURE — 1200000000 HC SEMI PRIVATE

## 2017-11-10 PROCEDURE — 87102 FUNGUS ISOLATION CULTURE: CPT

## 2017-11-10 PROCEDURE — 97535 SELF CARE MNGMENT TRAINING: CPT

## 2017-11-10 PROCEDURE — 82728 ASSAY OF FERRITIN: CPT

## 2017-11-10 PROCEDURE — 82746 ASSAY OF FOLIC ACID SERUM: CPT

## 2017-11-10 PROCEDURE — G8988 SELF CARE GOAL STATUS: HCPCS

## 2017-11-10 PROCEDURE — 64999 UNLISTED PX NERVOUS SYSTEM: CPT

## 2017-11-10 PROCEDURE — 80048 BASIC METABOLIC PNL TOTAL CA: CPT

## 2017-11-10 PROCEDURE — 82607 VITAMIN B-12: CPT

## 2017-11-10 PROCEDURE — 80202 ASSAY OF VANCOMYCIN: CPT

## 2017-11-10 PROCEDURE — 2580000003 HC RX 258

## 2017-11-10 PROCEDURE — 97116 GAIT TRAINING THERAPY: CPT

## 2017-11-10 PROCEDURE — 77012 CT SCAN FOR NEEDLE BIOPSY: CPT

## 2017-11-10 PROCEDURE — 70486 CT MAXILLOFACIAL W/O DYE: CPT

## 2017-11-10 PROCEDURE — 87205 SMEAR GRAM STAIN: CPT

## 2017-11-10 PROCEDURE — 87206 SMEAR FLUORESCENT/ACID STAI: CPT

## 2017-11-10 RX ORDER — MIDAZOLAM HYDROCHLORIDE 1 MG/ML
INJECTION INTRAMUSCULAR; INTRAVENOUS
Status: COMPLETED | OUTPATIENT
Start: 2017-11-10 | End: 2017-11-10

## 2017-11-10 RX ORDER — FENTANYL CITRATE 50 UG/ML
INJECTION, SOLUTION INTRAMUSCULAR; INTRAVENOUS
Status: COMPLETED | OUTPATIENT
Start: 2017-11-10 | End: 2017-11-10

## 2017-11-10 RX ADMIN — ALBUTEROL SULFATE 2.5 MG: 2.5 SOLUTION RESPIRATORY (INHALATION) at 15:57

## 2017-11-10 RX ADMIN — ALBUTEROL SULFATE 2.5 MG: 2.5 SOLUTION RESPIRATORY (INHALATION) at 20:05

## 2017-11-10 RX ADMIN — VANCOMYCIN HYDROCHLORIDE 1500 MG: 5 INJECTION, POWDER, LYOPHILIZED, FOR SOLUTION INTRAVENOUS at 20:38

## 2017-11-10 RX ADMIN — MIDAZOLAM HYDROCHLORIDE 1 MG: 1 INJECTION, SOLUTION INTRAMUSCULAR; INTRAVENOUS at 14:24

## 2017-11-10 RX ADMIN — MIDAZOLAM HYDROCHLORIDE 1 MG: 1 INJECTION, SOLUTION INTRAMUSCULAR; INTRAVENOUS at 14:43

## 2017-11-10 RX ADMIN — WATER 2 G: 1 INJECTION INTRAMUSCULAR; INTRAVENOUS; SUBCUTANEOUS at 05:39

## 2017-11-10 RX ADMIN — FENTANYL CITRATE 50 MCG: 50 INJECTION, SOLUTION INTRAMUSCULAR; INTRAVENOUS at 14:24

## 2017-11-10 RX ADMIN — VANCOMYCIN HYDROCHLORIDE 1500 MG: 5 INJECTION, POWDER, LYOPHILIZED, FOR SOLUTION INTRAVENOUS at 11:10

## 2017-11-10 RX ADMIN — FENTANYL CITRATE 50 MCG: 50 INJECTION, SOLUTION INTRAMUSCULAR; INTRAVENOUS at 14:44

## 2017-11-10 RX ADMIN — GUAIFENESIN 400 MG: 100 SOLUTION ORAL at 00:03

## 2017-11-10 RX ADMIN — WATER 2 G: 1 INJECTION INTRAMUSCULAR; INTRAVENOUS; SUBCUTANEOUS at 17:30

## 2017-11-10 RX ADMIN — HYDROMORPHONE HYDROCHLORIDE 0.5 MG: 1 INJECTION, SOLUTION INTRAMUSCULAR; INTRAVENOUS; SUBCUTANEOUS at 13:28

## 2017-11-10 RX ADMIN — ALBUTEROL SULFATE 2.5 MG: 2.5 SOLUTION RESPIRATORY (INHALATION) at 06:13

## 2017-11-10 RX ADMIN — DOXAZOSIN 4 MG: 4 TABLET ORAL at 20:38

## 2017-11-10 RX ADMIN — ALBUTEROL SULFATE 2.5 MG: 2.5 SOLUTION RESPIRATORY (INHALATION) at 10:36

## 2017-11-10 RX ADMIN — HYDROMORPHONE HYDROCHLORIDE 0.5 MG: 1 INJECTION, SOLUTION INTRAMUSCULAR; INTRAVENOUS; SUBCUTANEOUS at 08:15

## 2017-11-10 ASSESSMENT — PAIN DESCRIPTION - DESCRIPTORS: DESCRIPTORS: ACHING

## 2017-11-10 ASSESSMENT — PAIN SCALES - GENERAL
PAINLEVEL_OUTOF10: 10
PAINLEVEL_OUTOF10: 5
PAINLEVEL_OUTOF10: 10

## 2017-11-10 ASSESSMENT — PAIN DESCRIPTION - LOCATION
LOCATION: BACK

## 2017-11-10 ASSESSMENT — PAIN DESCRIPTION - FREQUENCY: FREQUENCY: CONTINUOUS

## 2017-11-10 ASSESSMENT — PAIN DESCRIPTION - ONSET: ONSET: ON-GOING

## 2017-11-10 ASSESSMENT — PAIN DESCRIPTION - PAIN TYPE: TYPE: ACUTE PAIN

## 2017-11-10 NOTE — BRIEF OP NOTE
Brief Postoperative Note    Geovanna Puckett  YOB: 1940  [id number]    Pre-operative Diagnosis: Discitis osteomyelitis      Post-operative Diagnosis: Same    Procedure: CT guided biopsy of T12-L1 Disc    Anesthesia: Moderate Sedation    Surgeons/Assistants: Дмитрий Willis MD    Estimated Blood Loss: less than 50     Complications: None    Specimens: Was Obtained: Single 11 gauge core from T12-L1 divided into two for histology and microbiology     Findings: Single 11 gauge core from T12-L1 divided into two for histology and microbiology     Electronically signed by Дмитрий Willis MD on 11/10/2017 at 3:28 PM

## 2017-11-10 NOTE — PROGRESS NOTES
[facility]   Section of Cardiology  Progress Note          Date:  11/10/2017  Patient: Corinne Doe  Admission:  11/7/2017  3:17 PM                           LOS: 3 days   Admit DX: Osteomyelitis ([address] [M86.9]  Age:  76 y. o., 1940          REASON OF EVALUATION: anticoagulation management      SUBJECTIVE     The patient was seen and examined. Notes and labs reviewed. There were not complications over night. Patient is working with PT presently. He did not go to IR yesterday due to anticoagulation and it was held until today. He denies any chest pain or SOB. Patient's cardiac review of systems: negative. The patient is generally feeling unchanged. Patient denies any chest pain, sob, palpitations, dizziness or syncope. Current Inpatient Medications:   tiotropium  18 mcg Inhalation Daily    vancomycin  1,500 mg Intravenous Q12H    vancomycin (VANCOCIN) intermittent dosing (placeholder)   Other RX Placeholder    cefepime  2 g Intravenous Q12H    albuterol  2.5 mg Nebulization 4x Daily    atorvastatin  20 mg Oral Daily    doxazosin  4 mg Oral Nightly    pantoprazole  40 mg Oral QAM AC    tamsulosin  0.4 mg Oral Daily    sodium chloride flush  10 mL Intravenous 2 times per day    cetirizine  10 mg Oral Daily    guaiFENesin  400 mg Oral Q6H       IV Infusions (if any):       OBJECTIVE    Vitals:    Vitals:    11/09/17 2041 11/09/17 2313 11/09/17 2347 11/10/17 0750   BP:   (!) 145/80 (!) 154/75   Pulse:   83 79   Resp: 18 16 20 20   Temp:   98.1 °F (36.7 °C) 98.6 °F (37 °C)   TempSrc:   Axillary Axillary   SpO2: 96%  93% 95%   Weight:       Height:             Intake/Output Summary (Last 24 hours) at 11/10/17 0944  Last data filed at 11/10/17 0820   Gross per 24 hour   Intake              250 ml   Output              475 ml   Net             -225 ml       Exam:  CONSTITUTIONAL:  awake, alert, cooperative, no apparent distress, and appears stated age.   HEENT: Normal jugular venous CPAP  · Moderate right ICA stenosis and 30% left ICA stenosis  · COPD  · Right lung nodule 1.5cm subpleural  · Hyponatremia  · Severe malnutrition  · Anemia without overt bleeding; Hgb stable  · Former smoker     Continue Lipitor 20mg  Okay to hold Eliquis today for IR procedure today and will await okay to resume  Continue supportive care  Will discuss with Dr. Jonathan Prieto    Discussed with the patient and RN; all questioned were fully answered. The note was completed by using EMR. However, inadvertent computerized transcription errors may be present. Although every effort was made to ensure accuracy, no guarantees can be provided that every mistake has been identified and corrected by editing.     Yolis Lilly, CNP

## 2017-11-10 NOTE — PLAN OF CARE
Problem: Pain:  Goal: Pain level will decrease  Pain level will decrease   Outcome: Ongoing  Pt has Roxicodone and Dilaudid ordered. Pt has only received Roxicodone on my shift and hadsbeen sleeping all night. Will continue to monitor.

## 2017-11-10 NOTE — PRE SEDATION
Sedation Pre-Procedure Note    Patient Name: Irina Tilley   YOB: 1940  Room/Bed: 1012/1012-02  Medical Record Number: [id number]  Date: 11/10/2017   Time: 3:27 PM       Indication:  Discitis osteomyelitis    Consent: I have discussed with the patient and/or the patient representative the indication, alternatives, and the possible risks and/or complications of the planned procedure and the anesthesia methods. The patient and/or patient representative appear to understand and agree to proceed. Vital Signs:   Vitals:    11/10/17 1505   BP: (!) 151/77   Pulse: 92   Resp: 18   Temp:    SpO2: 96%       Past Medical History:   has a past medical history of Arthritis; Chronic back pain; COPD (chronic obstructive pulmonary disease) ([address] Hyperlipidemia; Lumbar stenosis; MSSA (methicillin susceptible Staphylococcus aureus); Prostate enlargement; Sleep apnea; and Thoracic back pain. Past Surgical History:   has a past surgical history that includes Pilonidal cyst excision; Appendectomy; Knee arthroscopy (Left, 09/22/2017); and knee scope,diagnostic (Left, 9/22/2017).     Medications:   Scheduled Meds:    tiotropium  18 mcg Inhalation Daily    vancomycin  1,500 mg Intravenous Q12H    vancomycin (VANCOCIN) intermittent dosing (placeholder)   Other RX Placeholder    cefepime  2 g Intravenous Q12H    albuterol  2.5 mg Nebulization 4x Daily    atorvastatin  20 mg Oral Daily    doxazosin  4 mg Oral Nightly    pantoprazole  40 mg Oral QAM AC    tamsulosin  0.4 mg Oral Daily    sodium chloride flush  10 mL Intravenous 2 times per day    cetirizine  10 mg Oral Daily    guaiFENesin  400 mg Oral Q6H     Continuous Infusions:    PRN Meds: HYDROmorphone, oxyCODONE HCl, sodium chloride flush, acetaminophen, magnesium hydroxide, ondansetron, potassium chloride **OR** potassium chloride **OR** potassium chloride, benzocaine-menthol  Home Meds:   Prior to Admission medications    Medication Sig Start Date End Date Taking? Authorizing Provider   calcium-vitamin D (OSCAL-500) 500-200 MG-UNIT per tablet Take 1 tablet by mouth 2 times daily   Yes Historical Provider, MD   magnesium hydroxide (MILK OF MAGNESIA) 400 MG/5ML suspension Take 30 mLs by mouth daily as needed for Constipation   Yes Historical Provider, MD   Menthol, Topical Analgesic, (BIOFREEZE EX) Apply topically as needed (To knee and back for pain)   Yes Historical Provider, MD   levofloxacin (LEVAQUIN) 500 MG tablet Take 500 mg by mouth daily 11/4/17 11/14/17 Yes Historical Provider, MD   omeprazole (PRILOSEC) 20 MG delayed release capsule Take 20 mg by mouth daily   Yes Historical Provider, MD   tamsulosin (FLOMAX) 0.4 MG capsule Take 0.4 mg by mouth daily   Yes Historical Provider, MD   cyclobenzaprine (FLEXERIL) 5 MG tablet Take 5 mg by mouth 2 times daily as needed for Muscle spasms    Yes Historical Provider, MD   oxyCODONE HCl (OXY-IR) 10 MG immediate release tablet Take 10 mg by mouth every 6 hours as needed for Pain  .    Yes Historical Provider, MD   loratadine (CLARITIN) 10 MG capsule Take 10 mg by mouth daily   Yes Historical Provider, MD   albuterol (PROVENTIL) (2.5 MG/3ML) 0.083% nebulizer solution Take 3 mLs by nebulization 4 times daily 9/26/17  Yes Ignacio Bingham MD   tiotropium (SPIRIVA RESPIMAT) 2.5 MCG/ACT AERS inhaler Inhale 2 puffs into the lungs daily 9/26/17  Yes Ignacio Bingham MD   apixaban (ELIQUIS) 5 MG TABS tablet Take 1 tablet by mouth 2 times daily 9/26/17  Yes Ignacio Bingham MD   docusate sodium (COLACE, DULCOLAX) 100 MG CAPS Take 100 mg by mouth 2 times daily 9/26/17  Yes Ignacio Bingham MD   aspirin 81 MG tablet Take 81 mg by mouth daily   Yes Historical Provider, MD   atorvastatin (LIPITOR) 20 MG tablet Take 20 mg by mouth daily   Yes Historical Provider, MD   doxazosin (CARDURA) 4 MG tablet Take 4 mg by mouth nightly   Yes Historical Provider, MD   magnesium oxide (MAG-OX) 400 MG tablet Take 400 mg by mouth daily   Yes Historical Provider, MD   vitamin B-12 (CYANOCOBALAMIN) 500 MCG tablet Take 500 mcg by mouth daily    Yes Historical Provider, MD     Coumadin Use Last 7 Days:  no  Antiplatelet drug therapy use last 7 days: no  Other anticoagulant use last 7 days: no  Additional Medication Information:        Pre-Sedation Documentation and Exam:   I have reviewed the patient's history and review of systems.     Mallampati Airway Assessment:  Mallampati Class II - (soft palate, fauces & uvula are visible)    Prior History of Anesthesia Complications:   none    ASA Classification:  Class 3 - A patient with severe systemic disease that limits activity but is not incapacitating    Sedation/ Anesthesia Plan:   intravenous sedation    Medications Planned:   midazolam (Versed) intravenously and Fentanyl intravenously    Patient is an appropriate candidate for plan of sedation: yes    Electronically signed by Laura Graham MD on 11/10/2017 at 3:27 PM

## 2017-11-10 NOTE — PROGRESS NOTES
Occupational Therapy   Occupational Therapy Initial Assessment  Date: 11/10/2017   Patient Name: Tiffany Bro  MRN: [id number]     : 1940    D/C rec: SNF      RN reports patient is medically stable for therapy treatment this date. Chart reviewed prior to treatment and patient is agreeable for therapy. All lines intact and patient positioned comfortably at end of treatment. All patient needs addressed prior to ending therapy session. Patient Diagnosis(es): The primary encounter diagnosis was Osteomyelitis of thoracic region [facility]). A diagnosis of Pathologic compression fracture of spine, initial encounter [facility]) was also pertinent to this visit. has a past medical history of Arthritis; Chronic back pain; COPD (chronic obstructive pulmonary disease) ([address] Hyperlipidemia; Lumbar stenosis; MSSA (methicillin susceptible Staphylococcus aureus); Prostate enlargement; Sleep apnea; and Thoracic back pain. has a past surgical history that includes Pilonidal cyst excision; Appendectomy; Knee arthroscopy (Left, 2017); and knee scope,diagnostic (Left, 2017). Restrictions  Restrictions/Precautions  Restrictions/Precautions: General Precautions, Fall Risk  Position Activity Restriction  Other position/activity restrictions: Noted osteomyelitis in spine; noted possible need for TLSO. At this time - activity order as tolerated. Will progress mobility cautiously.      Subjective   General  Chart Reviewed: Yes  Patient assessed for rehabilitation services?: Yes  Family / Caregiver Present: Yes  Pain Assessment  Patient Currently in Pain: Yes  Pain Assessment: 0-10  Pain Level: 10  Pain Type: Acute pain  Pain Location: Back  Pain Orientation: Right  Pain Descriptors: Burning  Pain Frequency: Intermittent  Pain Intervention(s): Medication (see eMar)  Response to Pain Intervention: Asleep with RR greater than 10     Social/Functional History  Social/Functional History  Lives With: Spouse  Type of

## 2017-11-10 NOTE — POST SEDATION
Sedation Post Procedure Note    Patient Name: Richie Kanner   YOB: 1940  Room/Bed: 1012/1012-02  Medical Record Number: [id number]  Date: 11/10/2017   Time: 3:29 PM         Physicians/Assistants: Ania Frye MD    Procedure Performed:  CT guided biopsy of T12-L1 Disc      Post-Sedation Vital Signs:  Vitals:    11/10/17 1505   BP: (!) 151/77   Pulse: 92   Resp: 18   Temp:    SpO2: 96%      Vital signs were reviewed and were stable after the procedure (see flow sheet for vitals)            Post-Sedation Exam: Stable            Complications: none    Electronically signed by Ania Frye MD on 11/10/2017 at 3:29 PM

## 2017-11-10 NOTE — PROGRESS NOTES
that it was limiting his ability to ambulate. He did not have any associated fevers or chills and his daughter ended up consisting on them being brought into the emergency room for evaluation and the workup there does show multilevel discitis and osteomyelitis as well as possible pathologic fractures. There was a moderate right lower lobe consolidation and effusion noted and the patient was apparently being treated for pneumonia at the [facility]. I was asked to evaluate and help with antibiotic choice. Current evaluation: 11/10/2017  The patient is seen and evaluated at bedside and he is awake and alert and his wife as well as 2 daughters are at bedside with him. He does report pain especially with moving and it is controlled when he lays down in bed. He does not have any fevers or chills. He is rather disappointed that the IR guided CT drainage cannot be done today. Physical Examination :   BP (!) 154/75   Pulse 79   Temp 98.6 °F (37 °C) (Axillary)   Resp 20   Ht 6' 1\" (1.854 m)   Wt 243 lb 9.6 oz (110.5 kg)   SpO2 95%   BMI 32.14 kg/m²     Temperature Range: Temp: 98.6 °F (37 °C) Temp  Av °F (36.7 °C)  Min: 97.5 °F (36.4 °C)  Max: 98.6 °F (37 °C)     Physical Exam   Constitutional: He is oriented to person, place, and time. He appears well-developed and well-nourished. HENT:   Head: Normocephalic and atraumatic. Neck: Normal range of motion. Neck supple. Cardiovascular: Normal rate and regular rhythm. Pulmonary/Chest: Effort normal and breath sounds normal.   Abdominal: Soft. Bowel sounds are normal.   Neurological: He is alert and oriented to person, place, and time. Skin: Skin is warm and dry.          Laboratory data:   I have independently reviewed the following labs:  CBC with Differential:   Recent Labs      17   0707  11/10/17   0612   WBC  7.5  7.8   HGB  9.5*  10.1*   HCT  29.6*  30.8*   PLT  347  342   LYMPHOPCT  9  10   MONOPCT  11  9     BMP:   Recent Labs 11/09/17   0707  11/10/17   0612   NA  135  136   K  3.9  3.9   CL  99  97*   CO2  26  26   BUN  9  8   CREATININE  0.55*  0.52*     Hepatic Function Panel:   Recent Labs      11/07/17   1552  11/08/17   0646   PROT  7.6  6.6   LABALBU  2.9*  2.5*   BILIDIR  0.11   --    IBILI  0.38   --    BILITOT  0.49  0.43   ALKPHOS  90  84   ALT  53*  38   AST  47*  24     Recent Labs      11/10/17   0612   [facility]  17.3      Lab Results   Component Value Date    .6 (H) 09/21/2017     Lab Results   Component Value Date    SEDRATE 66 (H) 09/21/2017       Imaging Studies:   No new imaging    Cultures:     Culture Blood #1 [[id number]] Collected: 11/07/17 1840   Order Status: Completed Specimen: Blood Updated: 11/10/17 0002    Specimen Description . BLOOD 12ML RFA JK Performed at [address] [place] [address]    Specimen Description [address] [phone]    Special Requests NOT REPORTED    Culture NO GROWTH 3 DAYS    Culture Performed at [facility] [address], [address] [phone]    Status Pending   Culture Blood #1 [[id number]] Collected: 11/07/17 1842   Order Status: Completed Specimen: Blood Updated: 11/10/17 0002    Specimen Description . BLOOD 12ML RAC JK Performed at [address] [place] [address]    Specimen Description [address] [phone]    Special Requests NOT REPORTED    Culture NO GROWTH 3 DAYS    Culture Performed at [facility] [address], [address] [phone]    Status Pending     Cult,Wound [[id number]] (Abnormal) Collected: 11/07/17 2001   Order Status: Completed Specimen: Buttocks Updated: 11/09/17 0723    Specimen Description . BUTTOCK, LEFT    Special Requests NOT REPORTED    Direct Exam FEW NEUTROPHILS (A)    Direct Exam MODERATE BUDDING YEAST (A)    Direct Exam FEW GRAM POSITIVE COCCI IN PAIRS (A)    Culture PRESUMPTIVE CANDIDA ALBICANS HEAVY GROWTH (A)    Culture NORMAL SKIN JOB (A)    Culture Performed at [facility] [address] [facility], [address] [place] [address] [phone]    Status FINAL 11/09/2017     MRSA SCREENING CULTURE ONLY [[id number]] Collected: 11/08/17 2228   Order Status: Completed Specimen: Nares Updated: 11/09/17 0027    Specimen Description . NARES Performed at [address], [address]    Specimen Description  [id number] [phone]    Special Requests NOT REPORTED    Culture DUE TO THE SPECIMEN TYPE, THE ORDER WAS CANCELED AND REORDERED. PLEASE REFER TO:    Culture [id number]    Culture Performed at [facility] [address] [facility], [address] [place] [address] [phone]    Status FINAL 11/09/2017   Urine culture [[id number]] Collected: 11/07/17 2126   Order Status: Completed Specimen: Urine voided Updated: 11/08/17 2313    Specimen Description . CLEAN CATCH URINE Performed at [address] [place] [address] [address] [facility]    Specimen Description [place] [id number] [facility] [phone]    Special Requests NOT REPORTED    Culture NO SIGNIFICANT GROWTH    Culture Performed at [facility] [address] [facility], [address] [place] [address] [phone]    Status FINAL 11/08/2017     Medications:      tiotropium  18 mcg Inhalation Daily    vancomycin  1,500 mg Intravenous Q12H    vancomycin (VANCOCIN) intermittent dosing (placeholder)   Other RX Placeholder    cefepime  2 g Intravenous Q12H    albuterol  2.5 mg Nebulization 4x Daily    atorvastatin  20 mg Oral Daily    doxazosin  4 mg Oral Nightly    pantoprazole  40 mg Oral QAM AC    tamsulosin  0.4 mg Oral Daily    sodium chloride flush  10 mL Intravenous 2 times per day    cetirizine  10 mg Oral Daily    guaiFENesin  400 mg Oral Q6H     Thank you for allowing us to participate in the care of this patient. Please call with questions. Radha Amador M.D.   [employer] messaging [phone]

## 2017-11-10 NOTE — PROGRESS NOTES
room pt's family cmae out of room requesting pt be assisted back to bed due to increased pain levels, writer returned to room from [phone] to assist pt back to bed and assist pt finding position of comfort and pressure reduction on back     Plan   Times per week 1-2x/day  5-6x/ week    Current Treatment Recommendations Strengthening;Balance Training;Functional Mobility Training;Transfer Training;Gait Training;Neuromuscular Re-education; Endurance Training;Patient/Caregiver Education & Training; Safety Education & Training;Positioning   Jeramie Ball PTA     Time in; 839 Time out: 900

## 2017-11-11 ENCOUNTER — APPOINTMENT (OUTPATIENT)
Dept: GENERAL RADIOLOGY | Age: 77
DRG: 477 | End: 2017-11-11
Payer: MEDICARE

## 2017-11-11 PROBLEM — D50.9 IRON DEFICIENCY ANEMIA: Status: ACTIVE | Noted: 2017-11-11

## 2017-11-11 LAB
ABSOLUTE EOS #: 0.6 K/UL (ref 0–0.4)
ABSOLUTE IMMATURE GRANULOCYTE: ABNORMAL K/UL (ref 0–0.3)
ABSOLUTE LYMPH #: 0.7 K/UL (ref 1–4.8)
ABSOLUTE MONO #: 0.6 K/UL (ref 0.2–0.8)
ANION GAP SERPL CALCULATED.3IONS-SCNC: 13 MMOL/L (ref 9–17)
BASOPHILS # BLD: 0 %
BASOPHILS ABSOLUTE: 0 K/UL (ref 0–0.2)
BUN BLDV-MCNC: 9 MG/DL (ref 8–23)
BUN/CREAT BLD: 20 (ref 9–20)
CALCIUM SERPL-MCNC: 8.4 MG/DL (ref 8.6–10.4)
CHLORIDE BLD-SCNC: 99 MMOL/L (ref 98–107)
CO2: 24 MMOL/L (ref 20–31)
CREAT SERPL-MCNC: 0.45 MG/DL (ref 0.7–1.2)
DIFFERENTIAL TYPE: ABNORMAL
EOSINOPHILS RELATIVE PERCENT: 8 %
GFR AFRICAN AMERICAN: >60 ML/MIN
GFR NON-AFRICAN AMERICAN: >60 ML/MIN
GFR SERPL CREATININE-BSD FRML MDRD: ABNORMAL ML/MIN/{1.73_M2}
GFR SERPL CREATININE-BSD FRML MDRD: ABNORMAL ML/MIN/{1.73_M2}
GLUCOSE BLD-MCNC: 115 MG/DL (ref 70–99)
HCT VFR BLD CALC: 30.2 % (ref 41–53)
HEMOGLOBIN: 9.8 G/DL (ref 13.5–17.5)
IMMATURE GRANULOCYTES: ABNORMAL %
LYMPHOCYTES # BLD: 9 %
MCH RBC QN AUTO: 26.2 PG (ref 26–34)
MCHC RBC AUTO-ENTMCNC: 32.5 G/DL (ref 31–37)
MCV RBC AUTO: 80.6 FL (ref 80–100)
MONOCYTES # BLD: 8 %
PDW BLD-RTO: 16.1 % (ref 11.5–14.5)
PLATELET # BLD: 320 K/UL (ref 130–400)
PLATELET ESTIMATE: ABNORMAL
PMV BLD AUTO: 7.7 FL (ref 6–12)
POTASSIUM SERPL-SCNC: 3.7 MMOL/L (ref 3.7–5.3)
RBC # BLD: 3.75 M/UL (ref 4.5–5.9)
RBC # BLD: ABNORMAL 10*6/UL
SEG NEUTROPHILS: 75 %
SEGMENTED NEUTROPHILS ABSOLUTE COUNT: 5.7 K/UL (ref 1.8–7.7)
SODIUM BLD-SCNC: 136 MMOL/L (ref 135–144)
WBC # BLD: 7.7 K/UL (ref 3.5–11)
WBC # BLD: ABNORMAL 10*3/UL

## 2017-11-11 PROCEDURE — 6370000000 HC RX 637 (ALT 250 FOR IP): Performed by: INTERNAL MEDICINE

## 2017-11-11 PROCEDURE — 94640 AIRWAY INHALATION TREATMENT: CPT

## 2017-11-11 PROCEDURE — 97530 THERAPEUTIC ACTIVITIES: CPT

## 2017-11-11 PROCEDURE — 2580000003 HC RX 258: Performed by: INTERNAL MEDICINE

## 2017-11-11 PROCEDURE — 85025 COMPLETE CBC W/AUTO DIFF WBC: CPT

## 2017-11-11 PROCEDURE — 6360000002 HC RX W HCPCS: Performed by: INTERNAL MEDICINE

## 2017-11-11 PROCEDURE — 97116 GAIT TRAINING THERAPY: CPT

## 2017-11-11 PROCEDURE — 6360000002 HC RX W HCPCS

## 2017-11-11 PROCEDURE — 97535 SELF CARE MNGMENT TRAINING: CPT

## 2017-11-11 PROCEDURE — 99222 1ST HOSP IP/OBS MODERATE 55: CPT | Performed by: INTERNAL MEDICINE

## 2017-11-11 PROCEDURE — 94760 N-INVAS EAR/PLS OXIMETRY 1: CPT

## 2017-11-11 PROCEDURE — 2700000000 HC OXYGEN THERAPY PER DAY

## 2017-11-11 PROCEDURE — 99232 SBSQ HOSP IP/OBS MODERATE 35: CPT | Performed by: INTERNAL MEDICINE

## 2017-11-11 PROCEDURE — 36415 COLL VENOUS BLD VENIPUNCTURE: CPT

## 2017-11-11 PROCEDURE — 1200000000 HC SEMI PRIVATE

## 2017-11-11 PROCEDURE — 80048 BASIC METABOLIC PNL TOTAL CA: CPT

## 2017-11-11 PROCEDURE — 2580000003 HC RX 258

## 2017-11-11 RX ORDER — LANOLIN ALCOHOL/MO/W.PET/CERES
325 CREAM (GRAM) TOPICAL 2 TIMES DAILY WITH MEALS
Status: DISCONTINUED | OUTPATIENT
Start: 2017-11-11 | End: 2017-11-15 | Stop reason: HOSPADM

## 2017-11-11 RX ADMIN — WATER 2 G: 1 INJECTION INTRAMUSCULAR; INTRAVENOUS; SUBCUTANEOUS at 18:00

## 2017-11-11 RX ADMIN — ATORVASTATIN CALCIUM 20 MG: 20 TABLET, FILM COATED ORAL at 08:33

## 2017-11-11 RX ADMIN — APIXABAN 5 MG: 5 TABLET, FILM COATED ORAL at 13:24

## 2017-11-11 RX ADMIN — HYDROMORPHONE HYDROCHLORIDE 0.5 MG: 1 INJECTION, SOLUTION INTRAMUSCULAR; INTRAVENOUS; SUBCUTANEOUS at 17:56

## 2017-11-11 RX ADMIN — HYDROMORPHONE HYDROCHLORIDE 0.5 MG: 1 INJECTION, SOLUTION INTRAMUSCULAR; INTRAVENOUS; SUBCUTANEOUS at 09:22

## 2017-11-11 RX ADMIN — ALBUTEROL SULFATE 2.5 MG: 2.5 SOLUTION RESPIRATORY (INHALATION) at 16:08

## 2017-11-11 RX ADMIN — OXYCODONE HYDROCHLORIDE 10 MG: 5 TABLET ORAL at 15:41

## 2017-11-11 RX ADMIN — WATER 2 G: 1 INJECTION INTRAMUSCULAR; INTRAVENOUS; SUBCUTANEOUS at 07:01

## 2017-11-11 RX ADMIN — CETIRIZINE HYDROCHLORIDE 10 MG: 10 TABLET, FILM COATED ORAL at 08:33

## 2017-11-11 RX ADMIN — ALBUTEROL SULFATE 2.5 MG: 2.5 SOLUTION RESPIRATORY (INHALATION) at 12:24

## 2017-11-11 RX ADMIN — GUAIFENESIN 400 MG: 100 SOLUTION ORAL at 00:06

## 2017-11-11 RX ADMIN — ALBUTEROL SULFATE 2.5 MG: 2.5 SOLUTION RESPIRATORY (INHALATION) at 07:21

## 2017-11-11 RX ADMIN — FERROUS SULFATE TAB EC 325 MG (65 MG FE EQUIVALENT) 325 MG: 325 (65 FE) TABLET DELAYED RESPONSE at 18:00

## 2017-11-11 RX ADMIN — Medication 10 ML: at 20:22

## 2017-11-11 RX ADMIN — DOXAZOSIN 4 MG: 4 TABLET ORAL at 20:22

## 2017-11-11 RX ADMIN — Medication 10 ML: at 08:33

## 2017-11-11 RX ADMIN — PANTOPRAZOLE SODIUM 40 MG: 40 TABLET, DELAYED RELEASE ORAL at 08:33

## 2017-11-11 RX ADMIN — TIOTROPIUM BROMIDE 18 MCG: 18 CAPSULE ORAL; RESPIRATORY (INHALATION) at 07:21

## 2017-11-11 RX ADMIN — VANCOMYCIN HYDROCHLORIDE 1500 MG: 5 INJECTION, POWDER, LYOPHILIZED, FOR SOLUTION INTRAVENOUS at 06:59

## 2017-11-11 RX ADMIN — APIXABAN 5 MG: 5 TABLET, FILM COATED ORAL at 20:22

## 2017-11-11 RX ADMIN — TAMSULOSIN HYDROCHLORIDE 0.4 MG: 0.4 CAPSULE ORAL at 08:33

## 2017-11-11 RX ADMIN — GUAIFENESIN 400 MG: 100 SOLUTION ORAL at 07:07

## 2017-11-11 RX ADMIN — VANCOMYCIN HYDROCHLORIDE 1500 MG: 5 INJECTION, POWDER, LYOPHILIZED, FOR SOLUTION INTRAVENOUS at 20:15

## 2017-11-11 RX ADMIN — GUAIFENESIN 400 MG: 100 SOLUTION ORAL at 13:20

## 2017-11-11 RX ADMIN — GUAIFENESIN 400 MG: 100 SOLUTION ORAL at 17:56

## 2017-11-11 RX ADMIN — HYDROMORPHONE HYDROCHLORIDE 0.5 MG: 1 INJECTION, SOLUTION INTRAMUSCULAR; INTRAVENOUS; SUBCUTANEOUS at 13:39

## 2017-11-11 ASSESSMENT — PAIN DESCRIPTION - DESCRIPTORS
DESCRIPTORS: BURNING;SHARP

## 2017-11-11 ASSESSMENT — PAIN DESCRIPTION - LOCATION
LOCATION: OTHER (COMMENT)
LOCATION: OTHER (COMMENT)
LOCATION: ARM
LOCATION: ARM
LOCATION: OTHER (COMMENT)

## 2017-11-11 ASSESSMENT — PAIN DESCRIPTION - PAIN TYPE
TYPE: ACUTE PAIN

## 2017-11-11 ASSESSMENT — PAIN DESCRIPTION - FREQUENCY
FREQUENCY: INTERMITTENT

## 2017-11-11 ASSESSMENT — PAIN SCALES - GENERAL
PAINLEVEL_OUTOF10: 10
PAINLEVEL_OUTOF10: 10
PAINLEVEL_OUTOF10: 6
PAINLEVEL_OUTOF10: 10
PAINLEVEL_OUTOF10: 4

## 2017-11-11 ASSESSMENT — PAIN DESCRIPTION - ORIENTATION
ORIENTATION: RIGHT
ORIENTATION: RIGHT;UPPER

## 2017-11-11 NOTE — PROGRESS NOTES
Hepatic Function Panel:   No results for input(s): PROT, LABALBU, BILIDIR, IBILI, BILITOT, ALKPHOS, ALT, AST in the last 72 hours. Recent Labs      11/10/17   0612   [facility]  17.3      Lab Results   Component Value Date    .6 (H) 09/21/2017     Lab Results   Component Value Date    SEDRATE 66 (H) 09/21/2017       Imaging Studies:   No new imaging    Cultures:     AFB STAIN [[id number]] Collected: 11/10/17 1731   Order Status: Completed Specimen: Spine Updated: 11/11/17 1039    Specimen Description . SPINE Performed at [address]    Specimen Description  [id number] [phone]    Special Requests NOT REPORTED    Direct Exam NO ACID FAST BACILLI SEEN (DIRECT SMEAR)    Direct Exam Performed at [facility] [address], [address] [phone]    Status Pending   Culture Blood #1 [[id number]] Collected: 11/07/17 1840   Order Status: Completed Specimen: Blood Updated: 11/11/17 0807    Specimen Description . BLOOD 12ML RFA JK Performed at [address]    Specimen Description [address] [phone]    Special Requests NOT REPORTED    Culture NO GROWTH 4 DAYS    Culture Performed at [facility] [address], [address] [phone]    Status Pending   Culture Blood #1 [[id number]] Collected: 11/07/17 1842   Order Status: Completed Specimen: Blood Updated: 11/11/17 0807    Specimen Description . BLOOD 12ML RAC JK Performed at [address]    Specimen Description [address] [phone]    Special Requests NOT REPORTED    Culture NO GROWTH 4 DAYS    Culture Performed at [facility] [address], [address] [phone]    Status Pending   Cult,Aerobe/Anaerobe [[id number]] Collected: 11/10/17 1728   Order Status: Completed Specimen: Spine Updated: 11/10/17 2146    Specimen Description . SPINE Performed at [facility] allowing us to participate in the care of this patient. Please call with questions. Angelo Marin M.D.   [employer] messaging [phone]

## 2017-11-11 NOTE — PROGRESS NOTES
Spoke w/Dr. Audi Cabral (on-call IR) to check if ok to resume patient's eliquis- Dr. Audi Cabral states yes it is ok to resume today.

## 2017-11-11 NOTE — PROGRESS NOTES
(w/lower legs,feet, bottom)  UE Dressing: Minimal assistance (w/gown)  LE Dressing: Maximum assistance (w/footies)  Toileting: Stand by assistance (using urinal)     Balance  Sitting Balance: Stand by assistance  Standing Balance: Contact guard assistance  Standing Balance  Time: Pt stood for approx 2 min for transfers and dyn mob from eob to chair  Sit to stand: Contact guard assistance  Stand to sit: Contact guard assistance  Functional Mobility  Functional - Mobility Device: Rolling Walker  Assist Level: Contact guard assistance  Functional Mobility Comments: pt completed functional CGA; cues for safety and pacing. Bed mobility  Rolling to Left: Contact guard assistance  Supine to Sit: Contact guard assistance  Sit to supine: Stand by assistance  Scooting: Stand by assistance  Transfers  Stand Step Transfers: Contact guard assistance  Sit to stand: Contact guard assistance  Stand to sit: Contact guard assistance  Transfer Comments: w/RW      Assessment   Performance deficits / Impairments: Decreased functional mobility ; Decreased ADL status; Decreased strength;Decreased safe awareness;Decreased endurance  Treatment Diagnosis: Osteomyelitis  Prognosis: Good  Patient Education: OT POC, AE/DME, EC/WS Tech, Fall Prev, Safety with func mob and adls. Issued written info and pt verbalized understanding. Discharge Recommendations: Subacute/Skilled Nursing Facility  REQUIRES OT FOLLOW UP: Yes  Activity Tolerance  Activity Tolerance: Patient limited by pain  Safety Devices  Safety Devices in place: Yes  Type of devices: Call light within reach;Nurse notified; Left in chair;Gait belt;Patient at risk for falls       Discharge Recommendations:  [address]  Times per week: 4-5x/week, 1-2x/day  Current Treatment Recommendations: Strengthening, Balance Training, Functional Mobility Training, Endurance Training, Self-Care / ADL, Safety Education & Training, Patient/Caregiver Education & Training, Equipment Evaluation, Education, & procurement    Goals  Short term goals  Time Frame for Short term goals: by discharge, pt will  Short term goal 1: demo S/SBA with ADL tranfers with good safety  Short term goal 2: demo S/SBA with functional mob for ADL completion with good safety  Short term goal 3: demo min A toileting routine  Short term goal 4: demo SBA UB AdLs and min A LB with AE as needed and good pacing  Short term goal 5: verb good understanding of EC/WS techs, fall prevention techs, and possible equip needs to use at home  Patient Goals   Patient goals : dec. pain     Therapy Time   Individual Concurrent Group Co-treatment   Time In 0840         Time Out  1040         Minutes                 Pt in bed upon arrival. [id number] Kath Cordon per RN for OT tx and shower. Shower set up, supine to sit to eob CGA. Sit<>stand to RW and dyn mob in room with RW. Pt suddendly had severe pain in Rt under arm axillary area 25/10 pain level. RN notified and pain meds given. Pain subsided and transferred to chair next to bed. Pt set up at tray table for self care using Hibiclens wash (see above for LOF). Pt states \"exhausted and wanted to get back into bed\". Educ given (see above). Sit<>stand and transfer to eob and pt retired in supine. Bed alarm activated, call light and phone in reach. Pt was on RA throughout tx and needed increased time to complete d/t \"Im afraid to death to move my right arm because of that pain\". Pt lives with spouse whom pt states assists with some of wifes care.      [address] Sandor TANG, MELLO/L

## 2017-11-11 NOTE — PROGRESS NOTES
Follow-up back pain  Biopsy today complaining of back pain better with the pain medications  Review of system  No fever no chills no GI or  complaints no TIA no bleeding no headaches no polyuria nor polydipsia no hypoglycemia and sore throat no cardiopulmonary complaints  Physical exam vitals stable blood pressure systolically up  Lungs air entry equal decrease at the bases no crackles  Heart regular rate and rhythm no  Abdomen soft plus bowel sounds without any tenderness without any medical  Extremities good pulses with trace edema no Kingston sign  Neuro alert and oriented ×3 with no focal deficit  Assessment and plan  Spine osteomyelitis continue with IV antibiotics continue with oral and IV pain medications  COPD stable continue with bronchodilators  Paroxysmal atrial fibrillation  Obesity  Normocytic anemia  Severe malnutrition  Meds reviewed on protein supplements will restart several total in the morning if okay with cardiology see orders

## 2017-11-11 NOTE — PROGRESS NOTES
Follow-up back pain  Less back pain complaining of right sided chest pain sharp pain lasting minutes tender to touch no trauma denies shortness of breath  Review of system  No fever no chills no GI/ complaints no cardiac complaints, no TIA no bleeding, no headache, no polyuria nor polydipsia no hypoglycemia, no sore throat  Physical exam vitals stable  Eyes mild pallor no jaundice  Neck supple no JVD  Lungs clear to auscultation and percussion air entry equal  Heart regular rate and rhythm no gallop  Abdomen soft plus bowel sounds without any tenderness without any megalyExtremities good pulses without edema negative Kingston sign  Assessment and plan  Upper back pain: Continue with pain control pain medicine  Spinal osteomyelitis biopsy done continue IV antibiotics likely PICC line on Monday and may need to take 6 more weeks of IV antibiotics and possible rehab  Right sided chest pain tender to touch will check x-ray chest and ribs  Severe malnutrition and protein supplements  Iron deficiency anemia we'll start iron tablets as well as GI to see you may have GI workup as an outpatient COPD stable continue with bronchodilators  Paroxysmal history of fibrillation is stable controlled  Obesity  Meds labs reviewed see orders

## 2017-11-11 NOTE — CONSULTS
Faustina Guevara MD at [address] [place] [address]        Medications Prior to Admission:       Prior to Admission medications    Medication Sig Start Date End Date Taking? Authorizing Provider   calcium-vitamin D (OSCAL-500) 500-200 MG-UNIT per tablet Take 1 tablet by mouth 2 times daily   Yes Historical Provider, MD   magnesium hydroxide (MILK OF MAGNESIA) 400 MG/5ML suspension Take 30 mLs by mouth daily as needed for Constipation   Yes Historical Provider, MD   Menthol, Topical Analgesic, (BIOFREEZE EX) Apply topically as needed (To knee and back for pain)   Yes Historical Provider, MD   levofloxacin (LEVAQUIN) 500 MG tablet Take 500 mg by mouth daily 11/4/17 11/14/17 Yes Historical Provider, MD   omeprazole (PRILOSEC) 20 MG delayed release capsule Take 20 mg by mouth daily   Yes Historical Provider, MD   tamsulosin (FLOMAX) 0.4 MG capsule Take 0.4 mg by mouth daily   Yes Historical Provider, MD   cyclobenzaprine (FLEXERIL) 5 MG tablet Take 5 mg by mouth 2 times daily as needed for Muscle spasms    Yes Historical Provider, MD   oxyCODONE HCl (OXY-IR) 10 MG immediate release tablet Take 10 mg by mouth every 6 hours as needed for Pain  .    Yes Historical Provider, MD   loratadine (CLARITIN) 10 MG capsule Take 10 mg by mouth daily   Yes Historical Provider, MD   albuterol (PROVENTIL) (2.5 MG/3ML) 0.083% nebulizer solution Take 3 mLs by nebulization 4 times daily 9/26/17  Yes Velma Hernandez MD   tiotropium (SPIRIVA RESPIMAT) 2.5 MCG/ACT AERS inhaler Inhale 2 puffs into the lungs daily 9/26/17  Yes Velma Hernandez MD   apixaban (ELIQUIS) 5 MG TABS tablet Take 1 tablet by mouth 2 times daily 9/26/17  Yes Velma Hernandez MD   docusate sodium (COLACE, DULCOLAX) 100 MG CAPS Take 100 mg by mouth 2 times daily 9/26/17  Yes Velma Hernandez MD   aspirin 81 MG tablet Take 81 mg by mouth daily   Yes Historical Provider, MD   atorvastatin (LIPITOR) 20 MG tablet Take 20 mg by mouth daily   Yes Historical Provider, MD   doxazosin (CARDURA) 4 MG tablet Take 4 mg by mouth nightly   Yes Historical Provider, MD   magnesium oxide (MAG-OX) 400 MG tablet Take 400 mg by mouth daily   Yes Historical Provider, MD   vitamin B-12 (CYANOCOBALAMIN) 500 MCG tablet Take 500 mcg by mouth daily    Yes Historical Provider, MD        Allergies:       Latex; Ibuprofen; Penicillins; and Cephalosporins    Social History:     Tobacco:    reports that he has quit smoking. He has never used smokeless tobacco.  Alcohol:      reports that he drinks about 4.2 oz of alcohol per week . Drug Use:  reports that he does not use drugs. Family History:     History reviewed. No pertinent family history.     Review of Systems:     Positive and Negative as described in HPI    Constitutional:  negative for  fevers, chills, sweats, + fatigue, and weight loss  HEENT:  negative for vision or hearing changes,   Respiratory:  negative for shortness of breath, cough, or congestion  Cardiovascular:  negative for  chest pain, palpitations  Gastrointestinal:  negative for nausea, vomiting, diarrhea, constipation, mild abdominal pain  Genitourinary:  negative for frequency, dysuria  Integument:  negative for rash, skin lesions  Musculoskeletal: + for muscle aches or joint pain  Neurological: + headaches, dizziness, lightheadedness, numbness, pain and tingling extrimities  Behavior/Psych:  negative for depression and + anxiety    Code Status:  Full Code    Physical Exam:     Vitals:  BP (!) 146/66   Pulse 104   Temp 98.2 °F (36.8 °C) (Oral)   Resp 20   Ht 6' 1\" (1.854 m)   Wt 243 lb 9.6 oz (110.5 kg)   SpO2 95%   BMI 32.14 kg/m²   Temp (24hrs), Av.1 °F (36.7 °C), Min:97.9 °F (36.6 °C), Max:98.2 °F (36.8 °C)      General appearance - Obesity  alert, sick appearing, and in no acute distress  Mental status - oriented to person, place, and time with anxious affect  Head - normocephalic and atraumatic  Eyes - pupils equal and reactive, extraocular eye movements intact, conjunctiva clear  Ears - hearing appears to be intact  Nose - no drainage noted  Mouth - mucous membranes dry  Neck - supple, no carotid bruits, thyroid not palpable  Chest - Rales to auscultation, normal effort  Heart - normal rate, regular rhythm, no murmurs  Abdomen - soft, mild  tenderness, Bloated and mild distended, bowel sounds present all four quadrants, no masses, hepatomegaly or splenomegaly.  No hernias  Neurological - normal speech, no focal findings or movement disorder noted, cranial nerves II through XII grossly intact  Extremities - peripheral pulses palpable, no pedal edema or calf pain with palpation  Skin - no gross lesions, rashes, + induration noted  Cranial Nerves : grossly intact  Lymph nodes: not palpable    Data:   CBC:   Lab Results   Component Value Date    WBC 7.7 11/11/2017    RBC 3.75 11/11/2017    HGB 9.8 11/11/2017    HCT 30.2 11/11/2017    MCV 80.6 11/11/2017    MCH 26.2 11/11/2017    MCHC 32.5 11/11/2017    RDW 16.1 11/11/2017     11/11/2017    MPV 7.7 11/11/2017     CBC with Differential:    Lab Results   Component Value Date    WBC 7.7 11/11/2017    RBC 3.75 11/11/2017    HGB 9.8 11/11/2017    HCT 30.2 11/11/2017     11/11/2017    MCV 80.6 11/11/2017    MCH 26.2 11/11/2017    MCHC 32.5 11/11/2017    RDW 16.1 11/11/2017    LYMPHOPCT 9 11/11/2017    MONOPCT 8 11/11/2017    BASOPCT 0 11/11/2017    MONOSABS 0.60 11/11/2017    LYMPHSABS 0.70 11/11/2017    EOSABS 0.60 11/11/2017    BASOSABS 0.00 11/11/2017    DIFFTYPE NOT REPORTED 11/11/2017     Hemoglobin/Hematocrit:    Lab Results   Component Value Date    HGB 9.8 11/11/2017    HCT 30.2 11/11/2017     CMP:    Lab Results   Component Value Date     11/11/2017    K 3.7 11/11/2017    CL 99 11/11/2017    CO2 24 11/11/2017    BUN 9 11/11/2017    CREATININE 0.45 11/11/2017    GFRAA >60 11/11/2017    LABGLOM >60 11/11/2017    GLUCOSE 115 11/11/2017    PROT 6.6 11/08/2017    LABALBU 2.5 11/08/2017    CALCIUM 8.4 11/11/2017    BILITOT 0.43 11/08/2017

## 2017-11-11 NOTE — PROGRESS NOTES
[facility]   Section of Cardiology  Progress Note          Date:  11/11/2017  Patient: Ashleigh Osorio  Admission:  11/7/2017  3:17 PM                           LOS: 4 days   Admit DX: Osteomyelitis ([address] [M86.9]  Age:  76 y. o., 1940          REASON OF EVALUATION: anticoagulation management      SUBJECTIVE     The patient was seen and examined. Notes and labs reviewed. There were not complications over night. Patient is working with PT presently. He did not go to IR yesterday due to anticoagulation and it was held until today. He denies any chest pain or SOB. Patient's cardiac review of systems: negative. The patient is generally feeling unchanged. Patient denies any chest pain, sob, palpitations, dizziness or syncope.       Current Inpatient Medications:   tiotropium  18 mcg Inhalation Daily    vancomycin  1,500 mg Intravenous Q12H    vancomycin (VANCOCIN) intermittent dosing (placeholder)   Other RX Placeholder    cefepime  2 g Intravenous Q12H    albuterol  2.5 mg Nebulization 4x Daily    atorvastatin  20 mg Oral Daily    doxazosin  4 mg Oral Nightly    pantoprazole  40 mg Oral QAM AC    tamsulosin  0.4 mg Oral Daily    sodium chloride flush  10 mL Intravenous 2 times per day    cetirizine  10 mg Oral Daily    guaiFENesin  400 mg Oral Q6H       IV Infusions (if any):       OBJECTIVE    Vitals:    Vitals:    11/10/17 1945 11/11/17 0028 11/11/17 0725 11/11/17 0834   BP: (!) 144/66 (!) 164/64  (!) 144/69   Pulse: 102 84  91   Resp: 20 18  16   Temp: 98.1 °F (36.7 °C) 98.1 °F (36.7 °C)  97.9 °F (36.6 °C)   TempSrc: Oral Axillary  Oral   SpO2: 94% 91% 94% 93%   Weight:       Height:             Intake/Output Summary (Last 24 hours) at 11/11/17 1043  Last data filed at 11/11/17 0536   Gross per 24 hour   Intake             1608 ml   Output              450 ml   Net             1158 ml       Exam:  CONSTITUTIONAL:  awake, alert, cooperative, no apparent distress, and appears stated HCT 30.2 (L) 11/11/2017    HCT 30.8 (L) 11/10/2017    HCT 29.6 (L) 11/09/2017     11/11/2017     11/10/2017     11/09/2017     Lab Results   Component Value Date    MG 2.0 09/19/2017     Lab Results   Component Value Date    KHEWQOI 642 (H) 09/19/2017    CKMB 4.9 09/19/2017    TROPONINT <0.03 11/07/2017    TROPONINT <0.03 09/19/2017    TROPONINT <0.03 09/19/2017     Lab Results   Component Value Date    PROBNP 203 11/07/2017    PROBNP 2,309 (H) 09/19/2017     Lab Results   Component Value Date    INR 1.2 11/09/2017    INR 1.1 09/20/2017    INR 1.1 09/19/2017    APTT 33.0 (H) 11/09/2017    APTT 27.5 09/20/2017    APTT 28.8 09/19/2017    TSH 0.80 09/19/2017     Patient Active Problem List   Diagnosis    AAA (abdominal aortic aneurysm) without rupture (HCC)    Primary osteoarthritis of left knee    Knee effusion, left    Staphylococcal septicemia (HCC)    Lumbar and sacral osteoarthritis    Lumbar stenosis with neurogenic claudication    Degenerative spondylolisthesis    DDD (degenerative disc disease), thoracolumbar    COPD exacerbation (HCC)    UTI (urinary tract infection)    Elevated PSA    Staphylococcal arthritis of left knee (HCC)    Atrial fibrillation (HCC)    Acute cystitis without hematuria    Thrombocytopenia (HCC)    Severe malnutrition (HCC)    Prediabetes    Acute osteomyelitis of thoracic spine (HCC)    Thoracic discitis    Osteomyelitis of thoracic region ([address]    Decubitus ulcer of left buttock, stage 3 ([facility])     ASSESSMENT and PLAN    · Paroxysmal atrial fibrillation, currently SR  · Chronic anticoagulation, with Eliquis- currently on hold for IR CT-guided aspiration of thoracic spine  · Acute osteomyelitis of thoracic spine  · Severe spinal stenosis at L4-L5 with thoracic discitis  · S/P recent hospitalization with septic arthritis and UTI  · Abdominal aortic aneurysm, newly discovered at 3.9cm saccular and infrarenal-plan for outpatient AAA repair  · Hyperlipidemia  · KELLY, on CPAP  · Moderate right ICA stenosis and 30% left ICA stenosis  · COPD  · Right lung nodule 1.5cm subpleural  · Hyponatremia  · Severe malnutrition  · Anemia without overt bleeding; Hgb stable  · Former smoker     Continue Lipitor 20mg  Will resume anticoagulation with eliquis if ok with interventional radiology. Continue supportive care    Discussed with the patient and RN; all questioned were fully answered. The note was completed by using EMR. However, inadvertent computerized transcription errors may be present. Although every effort was made to ensure accuracy, no guarantees can be provided that every mistake has been identified and corrected by editing.     Rick Silva MD.

## 2017-11-11 NOTE — PROGRESS NOTES
Physical Therapy  Facility/Department: [facility] CARE  Daily Treatment Note  NAME: Anna Higgins  : 1940  MRN: [id number]    Date of Service: 2017    Patient Diagnosis(es):   Patient Active Problem List    Diagnosis Date Noted    Acute osteomyelitis of thoracic spine ([address] 2017    Thoracic discitis 2017    Osteomyelitis of thoracic region [facility])     Decubitus ulcer of left buttock, stage 3 ([facility])     Severe malnutrition ([address] 2017    Prediabetes 2017    Atrial fibrillation ([address] 2017    Acute cystitis without hematuria 2017    Thrombocytopenia ([address] 2017    Staphylococcal arthritis of left knee ([facility]) 2017    Primary osteoarthritis of left knee 2017    Knee effusion, left 2017    Staphylococcal septicemia ([address] 2017    Lumbar and sacral osteoarthritis 2017    Lumbar stenosis with neurogenic claudication 2017    Degenerative spondylolisthesis 2017    DDD (degenerative disc disease), thoracolumbar 2017    COPD exacerbation ([address] 2017    UTI (urinary tract infection) 2017    Elevated PSA 2017    AAA (abdominal aortic aneurysm) without rupture ([facility]) 2017       Past Medical History:   Diagnosis Date    Arthritis     Left knee     Chronic back pain     and knee pain/ sees chiropractor    COPD (chronic obstructive pulmonary disease) ([facility])     Hyperlipidemia     Lumbar stenosis     L4-L5    MSSA (methicillin susceptible Staphylococcus aureus)     Prostate enlargement     Sleep apnea     wears c-pap    Thoracic back pain     T4-T5      Past Surgical History:   Procedure Laterality Date    APPENDECTOMY      KNEE ARTHROSCOPY Left 2017    LEFT KNEE ARTHROSCOPIC LAVAGE, synovectomy; insertion drain    PILONIDAL CYST EXCISION      NH KNEE SCOPE,DIAGNOSTIC Left 2017    LEFT KNEE ARTHROSCOPIC LAVAGE performed by Deanna Lopez MD at [address] [place] [address] Restrictions  Restrictions/Precautions  Restrictions/Precautions: (P) General Precautions, Fall Risk  Position Activity Restriction  Other position/activity restrictions: Noted osteomyelitis in spine; noted possible need for TLSO. At this time - activity order as tolerated. Will progress mobility cautiously. Subjective   General  Chart Reviewed: Yes  Response To Previous Treatment: Patient with no complaints from previous session. Family / Caregiver Present: No  Subjective  Subjective: pt stated he's very fatigued today  General Comment  Comments: pt had c/o increased pain in R axillary area once sitting. Pain Screening  Patient Currently in Pain: Yes  Pain Assessment  Pain Assessment: 0-10  Pain Level: 10  Pain Type: Acute pain  Pain Location: Arm  Pain Orientation: Right;Upper  Vital Signs  Patient Currently in Pain: Yes       Orientation     Objective   Bed mobility  Bridging: Contact guard assistance  Scooting: Stand by assistance  Transfers  Sit to Stand: Contact guard assistance  Stand to sit: Contact guard assistance  Ambulation  Ambulation?: Yes  More Ambulation?: No  Ambulation 1  Surface: level tile  Device: Rolling Walker  Assistance: Contact guard assistance  Quality of Gait: VCs required uprigth posture, increased step length, and maintaining AD in close proximity to ROBB. Distance: 20'x1, 15'x1  Comments: required extended seated rest break  Stairs/Curb  Stairs?: No     Balance  Posture: Good  Sitting - Static: Good  Sitting - Dynamic: Good;-  Standing - Static: Fair  Standing - Dynamic: Fair;-                           Assessment   Body structures, Functions, Activity limitations: Decreased functional mobility ; Decreased strength;Decreased endurance;Decreased balance  Assessment: pain limits pt progress with mobility   Prognosis: Good  Patient Education: Safety, pressure reduction   REQUIRES PT FOLLOW UP: Yes  Activity Tolerance  Activity Tolerance: Patient limited by pain       Discharge Recommendations:  Subacute/Skilled Nursing Facility                            Goals  Short term goals  Time Frame for Short term goals: 12 tx's  Short term goal 1: Bed mobility independnet  Short term goal 2: Transfers independent  Short term goal 3: Amb x 25 ft with r.walker independnet  Short term goal 4: Progress mobility as medically able to tolerate. Patient Goals   Patient goals : Pt goal is to be able to mobilize. Plan    Plan  Times per week: 1-2x/day  5-6x/ week   Current Treatment Recommendations: Strengthening, Balance Training, Functional Mobility Training, Transfer Training, Gait Training, Neuromuscular Re-education, Endurance Training, Patient/Caregiver Education & Training, Safety Education & Training, Positioning  Safety Devices  Type of devices:  All fall risk precautions in place, Call light within reach, Left in bed, Patient at risk for falls, Bed alarm in place     Therapy Time   Individual Concurrent Group Co-treatment   Time In  0845         Time Out  0914         Minutes                   Art MARCH

## 2017-11-11 NOTE — PROGRESS NOTES
Nutrition Assessment    Type and Reason for Visit: Reassess    Nutrition Recommendations: 1. Modify general diet with cut up meats. 2. Adjust ONS to Ensure Enlive 2x/day and Chapo at lunches. Malnutrition Assessment:  · Malnutrition Status: Meets the criteria for severe malnutrition  · Context: Chronic illness  · Findings of the 6 clinical characteristics of malnutrition (Minimum of 2 out of 6 clinical characteristics is required to make the diagnosis of moderate or severe Protein Calorie Malnutrition based on AND/ASPEN Guidelines):  1. Energy Intake-Less than or equal to 75%, greater than or equal to 1 month    2. Weight Loss-10% loss or greater,  (in 2 months)  3. Fat Loss-No significant subcutaneous fat loss, Orbital  4. Muscle Loss-No significant muscle mass loss, Temples (temporalis muscle)  5. Fluid Accumulation-Mild fluid accumulation, Extremities  6.  Strength-Not measured    Nutrition Diagnosis:   · Problem: Severe malnutrition  · Etiology: related to Lack of self-feeding ability     Signs and symptoms:  as evidenced by Presence of wounds (wt loss 11.7% since 1/19/17)    Nutrition Assessment:  · Subjective Assessment: Pt is sitting up at time of visit. He reports poor oral intakes related to inability to feed self without pain (in his arm). He was able to complete >50% of his breakfast, agrees with cut up foods as it will be easier to eat. Note pt had two oral nutrition supplements on table tray. Agree with keeping one, however, discussed modifying second supplement to Chapo. Pt is receptive.    · Nutrition-Focused Physical Findings: GI:  +rotund, +soft, + Soft stool (11/10), +passing flatus; PV:  +RLE, trace, +LLE, +1, pitting; Skin:  +PU on (L) buttocks, stage I (clean/dry/intact)  · Wound Type: Pressure Ulcer  · Current Nutrition Therapies:  · Oral Diet Orders: General   · Oral Diet intake: 51-75%  · Anthropometric Measures:  · Ht: 6' 1\" (185.4 cm)   · Admission Body Wt: 243 lb 9.7 oz (110.5 kg)  · Usual Body Wt: 276 lb 0.3 oz (125.2 kg) (9/19/17)  · % Weight Change: 11.7%,  x 2 months  · Ideal Body Wt: 171 lb 15.3 oz (78 kg), % Ideal Body 142%  · BMI Classification: BMI 30.0 - 34.9 Obese Class I  · Comparative Standards (Estimated Nutrition Needs):  · Estimated Daily Total Kcal: 2,500-2,650 kcal  · Estimated Daily Protein (g):  g    Estimated Intake vs Estimated Needs: Intake Less Than Needs    Nutrition Risk Level: High    Nutrition Interventions:   Modify current diet, Modify current ONS  Continued Inpatient Monitoring, Coordination of Care    Nutrition Evaluation:   · Evaluation: Goals set   · Goals: PO intake to meet >75% of estimated kcal/protein needs    · Monitoring: Meal Intake, Supplement Intake, Diet Tolerance, Skin Integrity, Wound Healing, Weight, Pertinent Labs, Ascites/Edema, Nausea or Vomiting    See Adult Nutrition Doc Flowsheet for more detail.      Electronically signed by Michelle GREENE, AMINA, LD on 11/11/2017 at 3:39 PM    Contact Number:  [phone]

## 2017-11-12 ENCOUNTER — APPOINTMENT (OUTPATIENT)
Dept: GENERAL RADIOLOGY | Age: 77
DRG: 477 | End: 2017-11-12
Payer: MEDICARE

## 2017-11-12 PROBLEM — L89.153 DECUBITUS ULCER OF SACRAL REGION, STAGE 3 (HCC): Status: ACTIVE | Noted: 2017-11-12

## 2017-11-12 PROBLEM — J44.9 COPD (CHRONIC OBSTRUCTIVE PULMONARY DISEASE) (HCC): Status: ACTIVE | Noted: 2017-09-21

## 2017-11-12 LAB
ABSOLUTE EOS #: 0.7 K/UL (ref 0–0.4)
ABSOLUTE IMMATURE GRANULOCYTE: ABNORMAL K/UL (ref 0–0.3)
ABSOLUTE LYMPH #: 0.7 K/UL (ref 1–4.8)
ABSOLUTE MONO #: 0.7 K/UL (ref 0.2–0.8)
ALBUMIN SERPL-MCNC: 2.3 G/DL (ref 3.5–5.2)
ALBUMIN/GLOBULIN RATIO: ABNORMAL (ref 1–2.5)
ALP BLD-CCNC: 74 U/L (ref 40–129)
ALT SERPL-CCNC: 24 U/L (ref 5–41)
ANION GAP SERPL CALCULATED.3IONS-SCNC: 12 MMOL/L (ref 9–17)
AST SERPL-CCNC: 20 U/L
BASOPHILS # BLD: 0 %
BASOPHILS ABSOLUTE: 0 K/UL (ref 0–0.2)
BILIRUB SERPL-MCNC: 0.5 MG/DL (ref 0.3–1.2)
BUN BLDV-MCNC: 9 MG/DL (ref 8–23)
BUN/CREAT BLD: 19 (ref 9–20)
C-REACTIVE PROTEIN: 63.5 MG/L (ref 0–5)
CALCIUM SERPL-MCNC: 8.3 MG/DL (ref 8.6–10.4)
CHLORIDE BLD-SCNC: 99 MMOL/L (ref 98–107)
CO2: 24 MMOL/L (ref 20–31)
CREAT SERPL-MCNC: 0.47 MG/DL (ref 0.7–1.2)
DIFFERENTIAL TYPE: ABNORMAL
EOSINOPHILS RELATIVE PERCENT: 9 %
GFR AFRICAN AMERICAN: >60 ML/MIN
GFR NON-AFRICAN AMERICAN: >60 ML/MIN
GFR SERPL CREATININE-BSD FRML MDRD: ABNORMAL ML/MIN/{1.73_M2}
GFR SERPL CREATININE-BSD FRML MDRD: ABNORMAL ML/MIN/{1.73_M2}
GLUCOSE BLD-MCNC: 123 MG/DL (ref 70–99)
HCT VFR BLD CALC: 29.8 % (ref 41–53)
HEMOGLOBIN: 9.8 G/DL (ref 13.5–17.5)
IMMATURE GRANULOCYTES: ABNORMAL %
IRON SATURATION: 18 % (ref 20–55)
IRON: 26 UG/DL (ref 59–158)
LYMPHOCYTES # BLD: 9 %
MCH RBC QN AUTO: 26.6 PG (ref 26–34)
MCHC RBC AUTO-ENTMCNC: 33 G/DL (ref 31–37)
MCV RBC AUTO: 80.7 FL (ref 80–100)
MONOCYTES # BLD: 9 %
PDW BLD-RTO: 16.6 % (ref 11.5–14.5)
PLATELET # BLD: 322 K/UL (ref 130–400)
PLATELET ESTIMATE: ABNORMAL
PMV BLD AUTO: 7.3 FL (ref 6–12)
POTASSIUM SERPL-SCNC: 3.4 MMOL/L (ref 3.7–5.3)
RBC # BLD: 3.69 M/UL (ref 4.5–5.9)
RBC # BLD: ABNORMAL 10*6/UL
SEDIMENTATION RATE, ERYTHROCYTE: 97 MM (ref 0–15)
SEG NEUTROPHILS: 73 %
SEGMENTED NEUTROPHILS ABSOLUTE COUNT: 5.5 K/UL (ref 1.8–7.7)
SODIUM BLD-SCNC: 135 MMOL/L (ref 135–144)
TOTAL IRON BINDING CAPACITY: 144 UG/DL (ref 250–450)
TOTAL PROTEIN: 6.3 G/DL (ref 6.4–8.3)
UNSATURATED IRON BINDING CAPACITY: 118 UG/DL (ref 112–347)
WBC # BLD: 7.5 K/UL (ref 3.5–11)
WBC # BLD: ABNORMAL 10*3/UL

## 2017-11-12 PROCEDURE — 6360000002 HC RX W HCPCS: Performed by: INTERNAL MEDICINE

## 2017-11-12 PROCEDURE — 85025 COMPLETE CBC W/AUTO DIFF WBC: CPT

## 2017-11-12 PROCEDURE — 83540 ASSAY OF IRON: CPT

## 2017-11-12 PROCEDURE — 94760 N-INVAS EAR/PLS OXIMETRY 1: CPT

## 2017-11-12 PROCEDURE — 2700000000 HC OXYGEN THERAPY PER DAY

## 2017-11-12 PROCEDURE — 6370000000 HC RX 637 (ALT 250 FOR IP): Performed by: INTERNAL MEDICINE

## 2017-11-12 PROCEDURE — 83550 IRON BINDING TEST: CPT

## 2017-11-12 PROCEDURE — 36415 COLL VENOUS BLD VENIPUNCTURE: CPT

## 2017-11-12 PROCEDURE — 2580000003 HC RX 258: Performed by: INTERNAL MEDICINE

## 2017-11-12 PROCEDURE — 94640 AIRWAY INHALATION TREATMENT: CPT

## 2017-11-12 PROCEDURE — 85651 RBC SED RATE NONAUTOMATED: CPT

## 2017-11-12 PROCEDURE — 71101 X-RAY EXAM UNILAT RIBS/CHEST: CPT

## 2017-11-12 PROCEDURE — 80053 COMPREHEN METABOLIC PANEL: CPT

## 2017-11-12 PROCEDURE — 86140 C-REACTIVE PROTEIN: CPT

## 2017-11-12 PROCEDURE — 99232 SBSQ HOSP IP/OBS MODERATE 35: CPT | Performed by: INTERNAL MEDICINE

## 2017-11-12 PROCEDURE — 2060000000 HC ICU INTERMEDIATE R&B

## 2017-11-12 PROCEDURE — 2580000003 HC RX 258

## 2017-11-12 PROCEDURE — 6360000002 HC RX W HCPCS

## 2017-11-12 RX ORDER — FENTANYL 25 UG/H
1 PATCH TRANSDERMAL
Status: DISCONTINUED | OUTPATIENT
Start: 2017-11-12 | End: 2017-11-15 | Stop reason: HOSPADM

## 2017-11-12 RX ADMIN — ALBUTEROL SULFATE 2.5 MG: 2.5 SOLUTION RESPIRATORY (INHALATION) at 11:12

## 2017-11-12 RX ADMIN — Medication 10 ML: at 08:19

## 2017-11-12 RX ADMIN — TIOTROPIUM BROMIDE 18 MCG: 18 CAPSULE ORAL; RESPIRATORY (INHALATION) at 07:42

## 2017-11-12 RX ADMIN — FERROUS SULFATE TAB EC 325 MG (65 MG FE EQUIVALENT) 325 MG: 325 (65 FE) TABLET DELAYED RESPONSE at 17:19

## 2017-11-12 RX ADMIN — TAMSULOSIN HYDROCHLORIDE 0.4 MG: 0.4 CAPSULE ORAL at 08:17

## 2017-11-12 RX ADMIN — GUAIFENESIN 400 MG: 100 SOLUTION ORAL at 17:19

## 2017-11-12 RX ADMIN — WATER 2 G: 1 INJECTION INTRAMUSCULAR; INTRAVENOUS; SUBCUTANEOUS at 17:18

## 2017-11-12 RX ADMIN — APIXABAN 5 MG: 5 TABLET, FILM COATED ORAL at 08:18

## 2017-11-12 RX ADMIN — GUAIFENESIN 400 MG: 100 SOLUTION ORAL at 11:23

## 2017-11-12 RX ADMIN — POTASSIUM CHLORIDE 40 MEQ: 20 TABLET, EXTENDED RELEASE ORAL at 11:23

## 2017-11-12 RX ADMIN — ALBUTEROL SULFATE 2.5 MG: 2.5 SOLUTION RESPIRATORY (INHALATION) at 21:17

## 2017-11-12 RX ADMIN — ALBUTEROL SULFATE 2.5 MG: 2.5 SOLUTION RESPIRATORY (INHALATION) at 15:24

## 2017-11-12 RX ADMIN — HYDROMORPHONE HYDROCHLORIDE 0.5 MG: 1 INJECTION, SOLUTION INTRAMUSCULAR; INTRAVENOUS; SUBCUTANEOUS at 13:14

## 2017-11-12 RX ADMIN — WATER 2 G: 1 INJECTION INTRAMUSCULAR; INTRAVENOUS; SUBCUTANEOUS at 06:20

## 2017-11-12 RX ADMIN — HYDROMORPHONE HYDROCHLORIDE 0.5 MG: 1 INJECTION, SOLUTION INTRAMUSCULAR; INTRAVENOUS; SUBCUTANEOUS at 08:18

## 2017-11-12 RX ADMIN — ALBUTEROL SULFATE 2.5 MG: 2.5 SOLUTION RESPIRATORY (INHALATION) at 07:42

## 2017-11-12 RX ADMIN — DOXAZOSIN 4 MG: 4 TABLET ORAL at 20:08

## 2017-11-12 RX ADMIN — FERROUS SULFATE TAB EC 325 MG (65 MG FE EQUIVALENT) 325 MG: 325 (65 FE) TABLET DELAYED RESPONSE at 08:18

## 2017-11-12 RX ADMIN — HYDROMORPHONE HYDROCHLORIDE 0.5 MG: 1 INJECTION, SOLUTION INTRAMUSCULAR; INTRAVENOUS; SUBCUTANEOUS at 18:32

## 2017-11-12 RX ADMIN — ATORVASTATIN CALCIUM 20 MG: 20 TABLET, FILM COATED ORAL at 08:17

## 2017-11-12 RX ADMIN — CETIRIZINE HYDROCHLORIDE 10 MG: 10 TABLET, FILM COATED ORAL at 08:17

## 2017-11-12 RX ADMIN — OXYCODONE HYDROCHLORIDE 10 MG: 5 TABLET ORAL at 10:25

## 2017-11-12 RX ADMIN — VANCOMYCIN HYDROCHLORIDE 1500 MG: 5 INJECTION, POWDER, LYOPHILIZED, FOR SOLUTION INTRAVENOUS at 20:08

## 2017-11-12 RX ADMIN — VANCOMYCIN HYDROCHLORIDE 1500 MG: 5 INJECTION, POWDER, LYOPHILIZED, FOR SOLUTION INTRAVENOUS at 08:26

## 2017-11-12 RX ADMIN — APIXABAN 5 MG: 5 TABLET, FILM COATED ORAL at 20:08

## 2017-11-12 ASSESSMENT — PAIN SCALES - GENERAL
PAINLEVEL_OUTOF10: 4
PAINLEVEL_OUTOF10: 6
PAINLEVEL_OUTOF10: 4
PAINLEVEL_OUTOF10: 5
PAINLEVEL_OUTOF10: 4

## 2017-11-12 ASSESSMENT — PAIN DESCRIPTION - LOCATION: LOCATION: RIB CAGE;OTHER (COMMENT)

## 2017-11-12 ASSESSMENT — PAIN DESCRIPTION - ORIENTATION: ORIENTATION: RIGHT

## 2017-11-12 ASSESSMENT — PAIN DESCRIPTION - DESCRIPTORS: DESCRIPTORS: BURNING;SHARP

## 2017-11-12 ASSESSMENT — PAIN DESCRIPTION - PAIN TYPE: TYPE: ACUTE PAIN

## 2017-11-12 NOTE — PLAN OF CARE
Problem: Pain:  Goal: Control of acute pain  Control of acute pain   Outcome: Ongoing  Pt fall risk, fall band present, falling star, safety alarm activated and in use as needed. Hourly rounding performed. Pt encouraged to use call light. See Jerry Wallace fall risk assessment.

## 2017-11-12 NOTE — PROGRESS NOTES
Follow up back pain  Still back pain needing IV medication worse with movement  Review of system  No fever no chills no GI/ complaints no cardiopulmonary complaints, no TIA no bleeding, no polyuria nor polydipsia no hypoglycemia, no headache no sore throat  Physical exam vitals stable for acute  Eyes mild pallor no jaundice  Neck supple no JVD  Lungs air entry equal clear to auscultation percussion  Heart regular rate and rhythm no gallop  Abdomen soft no spinal sounds without any tenderness without organomegaly  Extremities good pulses without edema and negative Homans sign  Neuro alert and oriented ×3 with no focal deficits  Assessment and plan  Spinal osteomyelitis continue with IV antibiotics PICC line tomorrow  Severe malnutrition on protein supplements  Iron deficiency anemia on iron supplements GI saw the patient outpatient testing  Paroxysmal atrial fibrillation controlled  Obesity  We will add Duragesic patch to his pain management see orders

## 2017-11-12 NOTE — PROGRESS NOTES
GI Progress notes    11/12/2017   2:46 PM    Name:  Wil Morales  MRN:    [id number]     Corettalyside:     [de-identified]   Room:  [address] [place] [address] Day: 5     Admit Date: 11/7/2017  3:17 PM  PCP: Zoya Finn MD    Subjective:     C/C:   Chief Complaint   Patient presents with    Back Pain       Interval History: Status: improved. Overall stable    HGB is stable   Await OB from Stools  Has mild abd bloating  No overt bleeding or any melena  Denies any sig nausea or any vomiting      ROS:  Constitutional: negative for chills, fevers and sweats    Gastrointestinal: negative for abdominal pain, constipation, diarrhea, nausea and vomiting      Medications: Allergies: Allergies   Allergen Reactions    Latex Rash    Ibuprofen      Causes ankle swelling    Penicillins Other (See Comments)     Reaction as a child.     Cephalosporins Rash       Current Meds:   apixaban (ELIQUIS) tablet 5 mg BID   ferrous sulfate EC tablet 325 mg BID WC   HYDROmorphone (DILAUDID) injection 0.5 mg Q4H PRN   tiotropium (SPIRIVA) inhalation capsule 18 mcg Daily   vancomycin (VANCOCIN) 1,500 mg in dextrose 5 % 250 mL IVPB Q12H   vancomycin (VANCOCIN) intermittent dosing (placeholder) RX Placeholder   ceFEPIme (MAXIPIME) 2 g in sterile water 20 mL IV syringe Q12H   albuterol (PROVENTIL) nebulizer solution 2.5 mg 4x Daily   atorvastatin (LIPITOR) tablet 20 mg Daily   doxazosin (CARDURA) tablet 4 mg Nightly   oxyCODONE (ROXICODONE) immediate release tablet 10 mg Q8H PRN   pantoprazole (PROTONIX) tablet 40 mg QAM AC   tamsulosin (FLOMAX) capsule 0.4 mg Daily   sodium chloride flush 0.9 % injection 10 mL 2 times per day   sodium chloride flush 0.9 % injection 10 mL PRN   acetaminophen (TYLENOL) tablet 650 mg Q4H PRN   magnesium hydroxide (MILK OF MAGNESIA) 400 MG/5ML suspension 30 mL Daily PRN   ondansetron (ZOFRAN) injection 4 mg Q6H PRN   potassium chloride (KLOR-CON M) extended release tablet 40 mEq PRN   Or    potassium chloride 20 MEQ/15ML (10%) oral solution 40 mEq PRN   Or    potassium chloride 10 mEq/100 mL IVPB (Peripheral Line) PRN   cetirizine (ZYRTEC) tablet 10 mg Daily   guaiFENesin (ROBITUSSIN) 100 MG/5ML oral solution 400 mg Q6H   benzocaine-menthol (CEPACOL) 1 lozenge Q2H PRN       Data:     Code Status:  Full Code    History reviewed. No pertinent family history. Social History     Social History    Marital status:      Spouse name: N/A    Number of children: N/A    Years of education: N/A     Occupational History    Not on file. Social History Main Topics    Smoking status: Former Smoker    Smokeless tobacco: Never Used    Alcohol use 4.2 oz/week     7 Cans of beer per week    Drug use: No    Sexual activity: Not Currently     Other Topics Concern    Not on file     Social History Narrative    No narrative on file       Vitals:  BP (!) 150/71   Pulse 89   Temp 97.9 °F (36.6 °C) (Oral)   Resp 16   Ht 6' 1\" (1.854 m)   Wt 243 lb 9.6 oz (110.5 kg)   SpO2 98%   BMI 32.14 kg/m²   Temp (24hrs), Av.2 °F (36.8 °C), Min:97.9 °F (36.6 °C), Max:98.4 °F (36.9 °C)    No results for input(s): POCGLU in the last 72 hours. I/O (24Hr):     Intake/Output Summary (Last 24 hours) at 17 1446  Last data filed at 17 0623   Gross per 24 hour   Intake                0 ml   Output              575 ml   Net             -575 ml       Labs:      CBC: Lab Results   Component Value Date    WBC 7.5 2017    RBC 3.69 2017    HGB 9.8 2017    HCT 29.8 2017    MCV 80.7 2017    MCH 26.6 2017    MCHC 33.0 2017    RDW 16.6 2017     2017    MPV 7.3 2017     CBC with Differential:  Lab Results   Component Value Date    WBC 7.5 2017    RBC 3.69 2017    HGB 9.8 2017    HCT 29.8 2017     2017    MCV 80.7 2017    MCH 26.6 2017    MCHC 33.0 2017    RDW 16.6 2017    LYMPHOPCT 9 2017    MONOPCT 9 [E43] 09/24/2017    Staphylococcal arthritis of left knee ([facility] [M00.062] 09/22/2017    Primary osteoarthritis of left knee [M17.12] 09/21/2017    Staphylococcal septicemia ([facility] [A41.2] 09/21/2017    Lumbar and sacral osteoarthritis [M47.817] 09/21/2017    Lumbar stenosis with neurogenic claudication [M48.062] 09/21/2017    DDD (degenerative disc disease), thoracolumbar [M51.35] 09/21/2017    Elevated PSA [R97.20] 09/21/2017    AAA (abdominal aortic aneurysm) without rupture ([facility] [I71.4] 09/20/2017     Past Medical History:   Diagnosis Date    Arthritis     Left knee     Chronic back pain     and knee pain/ sees chiropractor    COPD (chronic obstructive pulmonary disease) (HCC)     Hyperlipidemia     Lumbar stenosis     L4-L5    MSSA (methicillin susceptible Staphylococcus aureus)     Prostate enlargement     Sleep apnea     wears c-pap    Thoracic back pain     T4-T5         Plan:        1. Cont PPI  2. F/U HGB  3. Check stools for OB   4.  Will re evaluate in am    Explained to the patient and d/W Nursing Staff  Will F/U with you  Please call or Page for any issues or change in status  Thanks    Electronically signed by Lurdes Palomino MD on 11/12/2017 at 2:46 PM

## 2017-11-12 NOTE — PROGRESS NOTES
[facility]   Section of Cardiology  Progress Note          Date:  11/12/2017  Patient: Anna Higgins  Admission:  11/7/2017  3:17 PM                           LOS: 5 days   Admit DX: Osteomyelitis ([address] [M86.9]  Age:  76 y. o., 1940          REASON OF EVALUATION: anticoagulation management      SUBJECTIVE     The patient was seen and examined. Notes and labs reviewed. There were not complications over night. Patient is working with PT presently. He did not go to IR yesterday due to anticoagulation and it was held until today. He denies any chest pain or SOB. Patient's cardiac review of systems: negative. The patient is generally feeling unchanged. Patient denies any chest pain, sob, palpitations, dizziness or syncope.       Current Inpatient Medications:   apixaban  5 mg Oral BID    ferrous sulfate  325 mg Oral BID WC    tiotropium  18 mcg Inhalation Daily    vancomycin  1,500 mg Intravenous Q12H    vancomycin (VANCOCIN) intermittent dosing (placeholder)   Other RX Placeholder    cefepime  2 g Intravenous Q12H    albuterol  2.5 mg Nebulization 4x Daily    atorvastatin  20 mg Oral Daily    doxazosin  4 mg Oral Nightly    pantoprazole  40 mg Oral QAM AC    tamsulosin  0.4 mg Oral Daily    sodium chloride flush  10 mL Intravenous 2 times per day    cetirizine  10 mg Oral Daily    guaiFENesin  400 mg Oral Q6H       IV Infusions (if any):       OBJECTIVE    Vitals:    Vitals:    11/12/17 0102 11/12/17 0742 11/12/17 0842 11/12/17 1123   BP: (!) 146/69  (!) 140/60 (!) 150/71   Pulse: 90  95 89   Resp: 18 20 20 16   Temp: 98.1 °F (36.7 °C)  98.4 °F (36.9 °C) 97.9 °F (36.6 °C)   TempSrc: Oral  Oral Oral   SpO2: 91% 95% 95% 98%   Weight:       Height:             Intake/Output Summary (Last 24 hours) at 11/12/17 1258  Last data filed at 11/12/17 5373   Gross per 24 hour   Intake                0 ml   Output              575 ml   Net             -575 ml       Exam:  CONSTITUTIONAL:  awake, alert, HGB 9.8 (L) 11/11/2017    HGB 10.1 (L) 11/10/2017    HCT 29.8 (L) 11/12/2017    HCT 30.2 (L) 11/11/2017    HCT 30.8 (L) 11/10/2017     11/12/2017     11/11/2017     11/10/2017     Lab Results   Component Value Date    MG 2.0 09/19/2017     Lab Results   Component Value Date    CKTOTAL 539 (H) 09/19/2017    CKMB 4.9 09/19/2017    TROPONINT <0.03 11/07/2017    TROPONINT <0.03 09/19/2017    TROPONINT <0.03 09/19/2017     Lab Results   Component Value Date    PROBNP 203 11/07/2017    PROBNP 2,309 (H) 09/19/2017     Lab Results   Component Value Date    INR 1.2 11/09/2017    INR 1.1 09/20/2017    INR 1.1 09/19/2017    APTT 33.0 (H) 11/09/2017    APTT 27.5 09/20/2017    APTT 28.8 09/19/2017    TSH 0.80 09/19/2017     Patient Active Problem List   Diagnosis    AAA (abdominal aortic aneurysm) without rupture (HCC)    Primary osteoarthritis of left knee    Knee effusion, left    Staphylococcal septicemia (HCC)    Lumbar and sacral osteoarthritis    Lumbar stenosis with neurogenic claudication    Degenerative spondylolisthesis    DDD (degenerative disc disease), thoracolumbar    COPD exacerbation (HCC)    UTI (urinary tract infection)    Elevated PSA    Staphylococcal arthritis of left knee (HCC)    Atrial fibrillation (HCC)    Acute cystitis without hematuria    Thrombocytopenia (HCC)    Severe malnutrition (HCC)    Prediabetes    Acute osteomyelitis of thoracic spine (HCC)    Thoracic discitis    Osteomyelitis of thoracic region ([address]    Decubitus ulcer of left buttock, stage 3 (HCC)    Iron deficiency anemia    Class 2 obesity due to excess calories with serious comorbidity in adult     ASSESSMENT and PLAN    · Paroxysmal atrial fibrillation, currently SR  · Chronic anticoagulation, with Eliquis- currently on hold for IR CT-guided aspiration of thoracic spine  · Acute osteomyelitis of thoracic spine  · Severe spinal stenosis at L4-L5 with thoracic discitis  · S/P recent

## 2017-11-12 NOTE — PROGRESS NOTES
Component Value Date    CRP 63.5 (H) 11/12/2017     Lab Results   Component Value Date    SEDRATE 97 (H) 11/12/2017       Imaging Studies:   No new imaging    Cultures:     Culture Blood #1 [[id number]] Collected: 11/07/17 1840   Order Status: Completed Specimen: Blood Updated: 11/12/17 0706    Specimen Description . BLOOD 12ML RFA JK Performed at [address] [place], [place] [id number] [facility]    Specimen Description [address] [phone]    Special Requests NOT REPORTED    Culture NO GROWTH 5 DAYS    Culture Performed at [facility] [address] [address] [phone]    Status Pending   Culture Blood #1 [[id number]] Collected: 11/07/17 1842   Order Status: Completed Specimen: Blood Updated: 11/12/17 0706    Specimen Description . BLOOD 12ML RAC JK Performed at [address] [place] [address]    Specimen Description [address] [phone]    Special Requests NOT REPORTED    Culture NO GROWTH 5 DAYS    Culture Performed at [facility] [address] [facility], [address] [place] [address] [phone]    Status Pending     Cult,Aerobe/Anaerobe [[id number]] (Abnormal) Collected: 11/10/17 1728   Order Status: Completed Specimen: Spine Updated: 11/11/17 1744    Specimen Description . SPINE Performed at [address] [place] [address] [address] [place], [place]    Specimen Description  [id number] [phone]    Special Requests NOT REPORTED    Direct Exam NO NEUTROPHILS SEEN    Direct Exam NO BACTERIA SEEN    Culture STAPHYLOCOCCUS AUREUS SCANT GROWTH (A)    Culture Performed at [facility] [address] [facility], [address] [place] [address] [phone]    Status Pending     Urine culture [[id number]] Collected: 11/07/17 2126   Order Status: Completed Specimen: Urine voided Updated: 11/08/17 2313    Specimen Description . CLEAN CATCH URINE Performed at [address] [place] [address]    Specimen Description [address] [phone]    Special Requests NOT REPORTED

## 2017-11-13 ENCOUNTER — APPOINTMENT (OUTPATIENT)
Dept: INTERVENTIONAL RADIOLOGY/VASCULAR | Age: 77
DRG: 477 | End: 2017-11-13
Payer: MEDICARE

## 2017-11-13 LAB
ANION GAP SERPL CALCULATED.3IONS-SCNC: 11 MMOL/L (ref 9–17)
BUN BLDV-MCNC: 9 MG/DL (ref 8–23)
BUN/CREAT BLD: 17 (ref 9–20)
CALCIUM SERPL-MCNC: 8.6 MG/DL (ref 8.6–10.4)
CASE NUMBER:: NORMAL
CHLORIDE BLD-SCNC: 96 MMOL/L (ref 98–107)
CO2: 28 MMOL/L (ref 20–31)
CREAT SERPL-MCNC: 0.52 MG/DL (ref 0.7–1.2)
CULTURE: NORMAL
DIRECT EXAM: ABNORMAL
GFR AFRICAN AMERICAN: >60 ML/MIN
GFR NON-AFRICAN AMERICAN: >60 ML/MIN
GFR SERPL CREATININE-BSD FRML MDRD: ABNORMAL ML/MIN/{1.73_M2}
GFR SERPL CREATININE-BSD FRML MDRD: ABNORMAL ML/MIN/{1.73_M2}
GLUCOSE BLD-MCNC: 115 MG/DL (ref 70–99)
Lab: ABNORMAL
Lab: NORMAL
Lab: NORMAL
POTASSIUM SERPL-SCNC: 4 MMOL/L (ref 3.7–5.3)
SODIUM BLD-SCNC: 135 MMOL/L (ref 135–144)
SPECIMEN DESCRIPTION: ABNORMAL
SPECIMEN DESCRIPTION: ABNORMAL
SPECIMEN DESCRIPTION: NORMAL
STATUS: ABNORMAL
STATUS: NORMAL
STATUS: NORMAL

## 2017-11-13 PROCEDURE — 6360000002 HC RX W HCPCS: Performed by: INTERNAL MEDICINE

## 2017-11-13 PROCEDURE — 2580000003 HC RX 258

## 2017-11-13 PROCEDURE — 6370000000 HC RX 637 (ALT 250 FOR IP): Performed by: INTERNAL MEDICINE

## 2017-11-13 PROCEDURE — 94640 AIRWAY INHALATION TREATMENT: CPT

## 2017-11-13 PROCEDURE — 36569 INSJ PICC 5 YR+ W/O IMAGING: CPT

## 2017-11-13 PROCEDURE — 2580000003 HC RX 258: Performed by: INTERNAL MEDICINE

## 2017-11-13 PROCEDURE — 2500000003 HC RX 250 WO HCPCS: Performed by: SURGERY

## 2017-11-13 PROCEDURE — 76937 US GUIDE VASCULAR ACCESS: CPT

## 2017-11-13 PROCEDURE — 36415 COLL VENOUS BLD VENIPUNCTURE: CPT

## 2017-11-13 PROCEDURE — 6360000002 HC RX W HCPCS

## 2017-11-13 PROCEDURE — 99232 SBSQ HOSP IP/OBS MODERATE 35: CPT | Performed by: INTERNAL MEDICINE

## 2017-11-13 PROCEDURE — 2580000003 HC RX 258: Performed by: RADIOLOGY

## 2017-11-13 PROCEDURE — 97530 THERAPEUTIC ACTIVITIES: CPT

## 2017-11-13 PROCEDURE — 02HV33Z INSERTION OF INFUSION DEVICE INTO SUPERIOR VENA CAVA, PERCUTANEOUS APPROACH: ICD-10-PCS | Performed by: RADIOLOGY

## 2017-11-13 PROCEDURE — 2060000000 HC ICU INTERMEDIATE R&B

## 2017-11-13 PROCEDURE — 77001 FLUOROGUIDE FOR VEIN DEVICE: CPT

## 2017-11-13 PROCEDURE — 97110 THERAPEUTIC EXERCISES: CPT

## 2017-11-13 PROCEDURE — C1751 CATH, INF, PER/CENT/MIDLINE: HCPCS

## 2017-11-13 PROCEDURE — 80048 BASIC METABOLIC PNL TOTAL CA: CPT

## 2017-11-13 RX ORDER — SODIUM CHLORIDE 0.9 % (FLUSH) 0.9 %
10 SYRINGE (ML) INJECTION EVERY 12 HOURS SCHEDULED
Status: DISCONTINUED | OUTPATIENT
Start: 2017-11-13 | End: 2017-11-15 | Stop reason: HOSPADM

## 2017-11-13 RX ORDER — LIDOCAINE HYDROCHLORIDE 10 MG/ML
5 INJECTION, SOLUTION INFILTRATION; PERINEURAL ONCE
Status: COMPLETED | OUTPATIENT
Start: 2017-11-13 | End: 2017-11-13

## 2017-11-13 RX ORDER — SODIUM CHLORIDE 0.9 % (FLUSH) 0.9 %
10 SYRINGE (ML) INJECTION PRN
Status: DISCONTINUED | OUTPATIENT
Start: 2017-11-13 | End: 2017-11-15 | Stop reason: HOSPADM

## 2017-11-13 RX ADMIN — PANTOPRAZOLE SODIUM 40 MG: 40 TABLET, DELAYED RELEASE ORAL at 05:41

## 2017-11-13 RX ADMIN — Medication 10 ML: at 21:19

## 2017-11-13 RX ADMIN — GUAIFENESIN 400 MG: 100 SOLUTION ORAL at 06:10

## 2017-11-13 RX ADMIN — TIOTROPIUM BROMIDE 18 MCG: 18 CAPSULE ORAL; RESPIRATORY (INHALATION) at 07:56

## 2017-11-13 RX ADMIN — APIXABAN 5 MG: 5 TABLET, FILM COATED ORAL at 09:54

## 2017-11-13 RX ADMIN — ALBUTEROL SULFATE 2.5 MG: 2.5 SOLUTION RESPIRATORY (INHALATION) at 12:00

## 2017-11-13 RX ADMIN — VANCOMYCIN HYDROCHLORIDE 1500 MG: 5 INJECTION, POWDER, LYOPHILIZED, FOR SOLUTION INTRAVENOUS at 09:51

## 2017-11-13 RX ADMIN — LIDOCAINE HYDROCHLORIDE 5 ML: 10 INJECTION, SOLUTION INFILTRATION; PERINEURAL at 06:15

## 2017-11-13 RX ADMIN — GUAIFENESIN 400 MG: 100 SOLUTION ORAL at 00:06

## 2017-11-13 RX ADMIN — HYDROMORPHONE HYDROCHLORIDE 0.5 MG: 1 INJECTION, SOLUTION INTRAMUSCULAR; INTRAVENOUS; SUBCUTANEOUS at 21:11

## 2017-11-13 RX ADMIN — HYDROMORPHONE HYDROCHLORIDE 0.5 MG: 1 INJECTION, SOLUTION INTRAMUSCULAR; INTRAVENOUS; SUBCUTANEOUS at 10:00

## 2017-11-13 RX ADMIN — FERROUS SULFATE TAB EC 325 MG (65 MG FE EQUIVALENT) 325 MG: 325 (65 FE) TABLET DELAYED RESPONSE at 16:59

## 2017-11-13 RX ADMIN — Medication 10 ML: at 21:12

## 2017-11-13 RX ADMIN — FERROUS SULFATE TAB EC 325 MG (65 MG FE EQUIVALENT) 325 MG: 325 (65 FE) TABLET DELAYED RESPONSE at 09:54

## 2017-11-13 RX ADMIN — TAMSULOSIN HYDROCHLORIDE 0.4 MG: 0.4 CAPSULE ORAL at 09:54

## 2017-11-13 RX ADMIN — ALBUTEROL SULFATE 2.5 MG: 2.5 SOLUTION RESPIRATORY (INHALATION) at 07:56

## 2017-11-13 RX ADMIN — ALBUTEROL SULFATE 2.5 MG: 2.5 SOLUTION RESPIRATORY (INHALATION) at 21:17

## 2017-11-13 RX ADMIN — GUAIFENESIN 400 MG: 100 SOLUTION ORAL at 16:59

## 2017-11-13 RX ADMIN — WATER 2 G: 1 INJECTION INTRAMUSCULAR; INTRAVENOUS; SUBCUTANEOUS at 18:34

## 2017-11-13 RX ADMIN — DOXAZOSIN 4 MG: 4 TABLET ORAL at 21:12

## 2017-11-13 RX ADMIN — HYDROMORPHONE HYDROCHLORIDE 0.5 MG: 1 INJECTION, SOLUTION INTRAMUSCULAR; INTRAVENOUS; SUBCUTANEOUS at 15:53

## 2017-11-13 RX ADMIN — WATER 2 G: 1 INJECTION INTRAMUSCULAR; INTRAVENOUS; SUBCUTANEOUS at 05:41

## 2017-11-13 RX ADMIN — ALBUTEROL SULFATE 2.5 MG: 2.5 SOLUTION RESPIRATORY (INHALATION) at 16:17

## 2017-11-13 RX ADMIN — OXYCODONE HYDROCHLORIDE 10 MG: 5 TABLET ORAL at 11:03

## 2017-11-13 RX ADMIN — VANCOMYCIN HYDROCHLORIDE 1500 MG: 5 INJECTION, POWDER, LYOPHILIZED, FOR SOLUTION INTRAVENOUS at 18:39

## 2017-11-13 RX ADMIN — APIXABAN 5 MG: 5 TABLET, FILM COATED ORAL at 21:12

## 2017-11-13 RX ADMIN — ATORVASTATIN CALCIUM 20 MG: 20 TABLET, FILM COATED ORAL at 09:54

## 2017-11-13 RX ADMIN — CETIRIZINE HYDROCHLORIDE 10 MG: 10 TABLET, FILM COATED ORAL at 09:54

## 2017-11-13 RX ADMIN — OXYCODONE HYDROCHLORIDE 10 MG: 5 TABLET ORAL at 18:55

## 2017-11-13 RX ADMIN — GUAIFENESIN 400 MG: 100 SOLUTION ORAL at 12:43

## 2017-11-13 ASSESSMENT — PAIN SCALES - GENERAL
PAINLEVEL_OUTOF10: 3
PAINLEVEL_OUTOF10: 2
PAINLEVEL_OUTOF10: 4
PAINLEVEL_OUTOF10: 6
PAINLEVEL_OUTOF10: 8
PAINLEVEL_OUTOF10: 8
PAINLEVEL_OUTOF10: 7

## 2017-11-13 NOTE — CARE COORDINATION
Discharge planning    Patient needing IV pain medication and IV atb for 6 weeks. Discussed with ID and patient is agreeable to discuss LTAC. Met with patient and family at the bedside. They would like to have [facility] and referral given to [facility] and notified ss.

## 2017-11-13 NOTE — CONSULTS
General Surgery:  Consult Note        PATIENT NAME: Lamonte Marte   YOB: 1940    ADMISSION DATE: 11/7/2017  3:17 PM     Admitting Provider: Dr. Amilcar Khan Physician: Dr. Freddy Wilson: 11/13/2017    Chief Complaint:  Spinal infection  Consult Regarding:  Sacral decubitus    HISTORY OF PRESENT ILLNESS:  The patient is a 76 y.o. male  who was admitted on 11/7 due to extreme back pain secondary to acute osteomyelitis of thoracic spine. Patient had recent history of staph sepsis with osteomyelitis that was treated with IV antibiotics for 6 weeks. He returned to hospital with back pain last week, complaining of not being able to move. General surgery consulted for evaluation of a sacral decubitus ulcer.       Past Medical History:        Diagnosis Date    Arthritis     Left knee     Chronic back pain     and knee pain/ sees chiropractor    COPD (chronic obstructive pulmonary disease) (HCC)     Hyperlipidemia     Lumbar stenosis     L4-L5    MSSA (methicillin susceptible Staphylococcus aureus)     Prostate enlargement     Sleep apnea     wears c-pap    Thoracic back pain     T4-T5        Past Surgical History:        Procedure Laterality Date    APPENDECTOMY      KNEE ARTHROSCOPY Left 09/22/2017    LEFT KNEE ARTHROSCOPIC LAVAGE, synovectomy; insertion drain    PILONIDAL CYST EXCISION      [place] KNEE SCOPE,DIAGNOSTIC Left 9/22/2017    LEFT KNEE ARTHROSCOPIC LAVAGE performed by Declan Colunga MD at [address] [place] [address]       Medications Prior to Admission:   Prescriptions Prior to Admission: calcium-vitamin D (OSCAL-500) 500-200 MG-UNIT per tablet, Take 1 tablet by mouth 2 times daily  magnesium hydroxide (MILK OF MAGNESIA) 400 MG/5ML suspension, Take 30 mLs by mouth daily as needed for Constipation  Menthol, Topical Analgesic, (BIOFREEZE EX), Apply topically as needed (To knee and back for pain)  levofloxacin (LEVAQUIN) 500 MG tablet, Take 500 mg by mouth daily  omeprazole (PRILOSEC) 20 MG delayed >60 11/12/2017    LABGLOM >60 11/12/2017    GLUCOSE 123 11/12/2017       Pertinent Radiology:   Xr Chest Standard (2 Vw)    Result Date: 11/7/2017  EXAMINATION: TWO VIEWS OF THE CHEST 11/7/2017 6:32 pm COMPARISON: 09/26/2017 HISTORY: ORDERING SYSTEM PROVIDED HISTORY: Chest Pain TECHNOLOGIST PROVIDED HISTORY: Reason for exam:->Chest Pain Ordering Physician Provided Reason for Exam: chest pain Acuity: Acute Type of Exam: Initial Relevant Medical/Surgical History: COPD FINDINGS: Frontal and lateral views of the chest are submitted for review. The cardiac silhouette is enlarged. Bilateral lower lobe opacities identified, likely a combination of pleural effusions and atelectasis. Moderate central vascular congestion noted without evidence for pneumothorax. Osseous structures and soft tissues are grossly stable. Multilevel degenerative endplate spurring of the thoracic spine. Cardiomegaly and pulmonary vascular congestion. Bilateral pleural effusions and adjacent airspace disease. Ct Thoracic Spine Wo Contrast    Result Date: 11/7/2017  EXAMINATION: CT OF THE THORACIC SPINE WITHOUT CONTRAST  11/7/2017 4:01 pm: TECHNIQUE: CT of the thoracic spine was performed without the administration of intravenous contrast. Multiplanar reformatted images are provided for review. Dose modulation, iterative reconstruction, and/or weight based adjustment of the mA/kV was utilized to reduce the radiation dose to as low as reasonably achievable. COMPARISON: MRI thoracic spine September 21, 2017. HISTORY: ORDERING SYSTEM PROVIDED HISTORY: Palpable soft tissue mass around the 7th 8th thoracic vertebrae with radicular symptoms on the right FINDINGS: BONES/ALIGNMENT: There is normal alignment of the spine except for dextroconvex scoliosis at T4-5. The vertebral body heights are maintained. Osteolytic changes are noted at the T4-5, T9-10, and T12-L1.   Endplate Associated pathologic fractures are not excluded as there appears to be cortical breakthrough. This bony central canal and neural foramina are grossly patent DEGENERATIVE CHANGES: There is multilevel disc space narrowing and moderate spondylosis. SOFT TISSUES: No gross paraspinal mass is seen. There is a moderate right pleural effusion and right lower lobe consolidation. Multilevel discitis and possible pathologic fractures. Osteomyelitis is suspected. Metastatic disease less likely. Probable pathologic fractures but no loss of height at this time or retropulsion. No involvement of the posterior elements. Follow-up MRI with gadolinium recommended to exclude paraspinal or epidural abscess. Moderate right effusion and right lower lobe consolidation. RECOMMENDATIONS: The findings were sent to the Radiology Results [address] at 4:37 pm on 11/7/2017to be communicated to a licensed caregiver. Case discussed with Alejandra Brown nurse practitioner at 4:42 p.m.          ASSESSMENT:  Active Hospital Problems    Diagnosis Date Noted    Decubitus ulcer of sacral region, stage 3 ([address] [L89.153] 11/12/2017    Iron deficiency anemia due to chronic blood loss [D50.0]     Iron deficiency anemia [D50.9] 11/11/2017    Class 2 obesity due to excess calories with serious comorbidity in adult [E66.09]     Acute osteomyelitis of thoracic spine ([address] [M46.24] 11/08/2017    Thoracic discitis [M46.44] 11/08/2017    Osteomyelitis of thoracic region [facility]) [M46.24]     Decubitus ulcer of left buttock, stage 3 ([address] [L89.323]     Severe malnutrition ([address] [E43] 09/24/2017    Staphylococcal arthritis of left knee ([address] [M00.062] 09/22/2017    Primary osteoarthritis of left knee [M17.12] 09/21/2017    Staphylococcal septicemia ([address] [A41.2] 09/21/2017    Lumbar and sacral osteoarthritis [M47.817] 09/21/2017    Lumbar stenosis with neurogenic claudication [M48.062] 09/21/2017    DDD (degenerative disc disease), thoracolumbar [M51.35] 09/21/2017    Elevated PSA [R97.20] 09/21/2017  AAA (abdominal aortic aneurysm) without rupture ([facility] [I71.4] 09/20/2017       1. Osteomyelitis of spine   2. Sacral decubitus - unable to stage    Plan:  1. Continue medical mgmt and supportive care per primary  2. Plan for bedside debridement of ulcer  3. Once cleaned will need wet to dry dressings and off loading weight to the area  4. Educated patient on not staying in one position for long periods of time  5. Consult to wound care  6. Medical management per primary tea      Electronically signed by Katerine Anderson DO  on 11/13/2017 at 5:04 AM   Attending Physician Statement  I have discussed the case, including pertinent history and exam findings with the resident. I agree with the assessment, plan and orders as documented by the resident.   Needs debridement  Recommend keeping pressure off the sacrum

## 2017-11-13 NOTE — PLAN OF CARE
Problem: Pain:  Goal: Control of acute pain  Control of acute pain   Patient states understanding of pain scale and interventions. Pain assessed with hourly rounding and PRN. Patient states pain level is 7 /10. Will continue to monitor.

## 2017-11-13 NOTE — PROGRESS NOTES
Physical Therapy  Facility/Department: [facility] CARE  Daily Treatment Note  NAME: Juan Manuel Lane  : 1940  MRN: [id number]    Date of Service: 2017    Patient Diagnosis(es):   Patient Active Problem List    Diagnosis Date Noted    Decubitus ulcer of sacral region, stage 3 ([address] 2017    Iron deficiency anemia due to chronic blood loss     Iron deficiency anemia 2017    Class 2 obesity due to excess calories with serious comorbidity in adult     Acute osteomyelitis of thoracic spine ([address] 2017    Thoracic discitis 2017    Osteomyelitis of thoracic region [facility])     Decubitus ulcer of left buttock, stage 3 (HCC)     Severe malnutrition ([address] 2017    Prediabetes 2017    Atrial fibrillation ([address] 2017    Acute cystitis without hematuria 2017    Thrombocytopenia ([address] 2017    Staphylococcal arthritis of left knee ([address] 2017    Primary osteoarthritis of left knee 2017    Knee effusion, left 2017    Staphylococcal septicemia ([address] 2017    Lumbar and sacral osteoarthritis 2017    Lumbar stenosis with neurogenic claudication 2017    Degenerative spondylolisthesis 2017    DDD (degenerative disc disease), thoracolumbar 2017    COPD (chronic obstructive pulmonary disease) ([address] 2017    UTI (urinary tract infection) 2017    Elevated PSA 2017    AAA (abdominal aortic aneurysm) without rupture ([facility]) 2017       Past Medical History:   Diagnosis Date    Arthritis     Left knee     Chronic back pain     and knee pain/ sees chiropractor    COPD (chronic obstructive pulmonary disease) (HCC)     Hyperlipidemia     Lumbar stenosis     L4-L5    MSSA (methicillin susceptible Staphylococcus aureus)     Prostate enlargement     Sleep apnea     wears c-pap    Thoracic back pain     T4-T5      Past Surgical History:   Procedure Laterality Date    APPENDECTOMY      KNEE ARTHROSCOPY Left 09/22/2017    LEFT KNEE ARTHROSCOPIC LAVAGE, synovectomy; insertion drain    PILONIDAL CYST EXCISION      [place] KNEE SCOPE,DIAGNOSTIC Left 9/22/2017    LEFT KNEE ARTHROSCOPIC LAVAGE performed by Lesvia Coppola MD at [facility]  Restrictions/Precautions  Restrictions/Precautions: General Precautions, Fall Risk  Position Activity Restriction  Other position/activity restrictions: Noted osteomyelitis in spine; noted possible need for TLSO. At this time - activity order as tolerated. Will progress mobility cautiously. Subjective   General  Response To Previous Treatment: Patient with no complaints from previous session. Family / Caregiver Present: Yes  Subjective  Subjective: Patient states that he will try to get up and sit at edge of bed. Reports significant pain in back that is limiting all movement at this time  Pain Screening  Patient Currently in Pain: Yes  Vital Signs  Patient Currently in Pain: Yes       Orientation  Orientation  Overall Orientation Status: Within Functional Limits  Objective   Bed mobility  Rolling to Left: Contact guard assistance  Supine to Sit: Minimal assistance  Sit to Supine: Minimal assistance  Scooting: Contact guard assistance  Transfers  Comment: Patient did not attempt standing today. He is very fearful of all movement because of intensity of pain. States that he was surprised that he did ok sitting up today and will try to stand next visit        Balance  Posture: Good  Sitting - Static: Good  Sitting - Dynamic: Good;-   Patient only required SBA once sitting at edge of bed this session. Exercises:  Patient performed 7-10 reps each of seated B LE exercises to all joints/available planes including hip flexion, hip abduction, long arc quads, and ankle pumps with verbal cues for exercise instruction. Patient with limited tolerance to sitting position but able to spend approximately 10 minutes sitting up at edge of bed while exercising. Assessment   Body structures, Functions, Activity limitations: Decreased functional mobility ; Decreased strength;Decreased endurance;Decreased balance    Patient tolerated session well with minimal deficits noted in bed mobility, balance, and endurance this session. At current level of function, patient will likely still require a SNF to improve IND and safety with all functional mobility after discharge from the hospital.     Prognosis: Good  Patient Education: PT POC, importance of bed mobility to reduce pressure on back and buttocks  REQUIRES PT FOLLOW UP: Yes  Activity Tolerance  Activity Tolerance: Patient Tolerated treatment well;Patient limited by endurance; Patient limited by pain       Discharge Recommendations:  Subacute/Skilled Nursing Facility       Goals  Short term goals  Time Frame for Short term goals: 12 tx's  Short term goal 1: Bed mobility independnet  Short term goal 2: Transfers independent  Short term goal 3: Amb x 25 ft with r.walker independnet  Short term goal 4: Progress mobility as medically able to tolerate. Patient Goals   Patient goals : Pt goal is to be able to mobilize. Plan    Plan  Times per week: 1-2x/day  5-6x/ week   Current Treatment Recommendations: Strengthening, Balance Training, Functional Mobility Training, Transfer Training, Gait Training, Neuromuscular Re-education, Endurance Training, Patient/Caregiver Education & Training, Safety Education & Training, Positioning  Safety Devices  Type of devices:  All fall risk precautions in place, Call light within reach, Left in bed, Nurse notified     Therapy Time   Individual Concurrent Group Co-treatment   Time In 1232         Time Out 1304         Minutes Barbi [address] [place] [address]

## 2017-11-13 NOTE — PROCEDURES
PROCEDURE NOTE - Incision and debridement    PATIENT NAME: Cristian Delatorre RECORD NO. [id number]  DATE: 11/13/2017  SURGEON: Dr. Riya Russell MD/ GORAN Rangel, PGY2  PRIMARY CARE PHYSICIAN: Norris Scruggs MD    PREOPERATIVE DIAGNOSIS: Sacral decubitus ulcer, left buttock    POSTOPERATIVE DIAGNOSIS:  Stage 3 sacral decubitus ulcer, left buttock. PROCEDURE PERFORMED:  Incision and Drainage  SURGEON: Dr. Riya Russell MD/ GORAN Rangel, PGY2  ANESTHESIA:  Local utilizing  Lidocaine 1% without epinephrine  ESTIMATED BLOOD LOSS:  Less than 5 ml. COMPLICATIONS:  None immediately appreciated. OPERATIVE NOTE PREPARED BY: Makayla Gill DO     DISCUSSION:  Aroldo Gaston is a 76y.o.-year-old male. The history and physical examination were reviewed and confirmed. The diagnoses, proposed procedure, risks, possible complications, benefits and alternatives were discussed with the patient. He was given the opportunity to ask questions, and once answered, verbal consent was obtained. The patient was then prepared for the procedure. PROCEDURE:  A timeout was initiated and the procedure and patient were confirmed by those present. Patient was placed in right lateral decubitus position and there was noted to be a 2 x 3 cm pressure ulcer noted in the left, inferior buttock. The area was prepped with betadine solution. A total of 7 cc 1% lidocaine without epinephrine was used to locally anesthetize the area. Sharp debridement of the wound with a 15 blade was used to remove the black eschar and dead tissue down to healthy and bleeding tissue. Subcutaneous tissue was encountered making the ulcer stage 3. The ulcer was covered with a dry fluff and ABD pad as dressing. The patient was educated on keeping pressure off the ulcerated area. This concluded the procedure. No immediate complication was evident. All sponge, instrument and needle counts were correct at the completion of the procedure. Dr. Riya Russell MD, was present for entire procedure. Sada Rangel DO  11/13/17, 6:45 AM

## 2017-11-13 NOTE — PROGRESS NOTES
98%   BMI 32.14 kg/m²   Jose Risk Score: Jose Scale Score: 17    LABS    CBC:   Lab Results   Component Value Date    WBC 7.5 11/12/2017    RBC 3.69 11/12/2017    HGB 9.8 11/12/2017     CMP:  Albumin:    Lab Results   Component Value Date    LABALBU 2.3 11/12/2017     PT/INR:    Lab Results   Component Value Date    PROTIME 12.2 11/09/2017    INR 1.2 11/09/2017     HgBA1c:    Lab Results   Component Value Date    LABA1C 5.9 09/19/2017     PTT: No components found for: LABPTT      Assessment:     Patient Active Problem List   Diagnosis    AAA (abdominal aortic aneurysm) without rupture (HCC)    Primary osteoarthritis of left knee    Knee effusion, left    Staphylococcal septicemia (HCC)    Lumbar and sacral osteoarthritis    Lumbar stenosis with neurogenic claudication    Degenerative spondylolisthesis    DDD (degenerative disc disease), thoracolumbar    COPD (chronic obstructive pulmonary disease) (HCC)    UTI (urinary tract infection)    Elevated PSA    Staphylococcal arthritis of left knee (HCC)    Atrial fibrillation (HCC)    Acute cystitis without hematuria    Thrombocytopenia (HCC)    Severe malnutrition (HCC)    Prediabetes    Acute osteomyelitis of thoracic spine (HCC)    Thoracic discitis    Osteomyelitis of thoracic region ([address]    Decubitus ulcer of left buttock, stage 3 (HCC)    Iron deficiency anemia    Class 2 obesity due to excess calories with serious comorbidity in adult    Iron deficiency anemia due to chronic blood loss    Decubitus ulcer of sacral region, stage 3 ([address]       Measurements:  Surgical bedside debridement completed this morning by general surgeons. Reinforce dressing as needed today and begin BID dressings in AM.          Plan:     Turn every 2 hours  Float heels of of bed with pillows under calves     NS moist to moist dressings to the left buttock wound.  Begin BID dressings 11/14/17 in AM     Routine incontinence care with incontinence barrier cloths and zinc oxide cream. Apply zinc oxide cream BID and prn incontinence. Covidien moisture wicking under pads vs cloth backed briefs     Static air overlay. Check inflation each shift by sliding hand under the air overlay. Feel for the patient's heaviest/ most dependant body part. Ideally 1/2 to 1\" of air will be between your hand and the patient's body.  Add air prn.     Specialty Bed Required : Yes   [] Low Air Loss   [x] Pressure Redistribution  [] Fluid Immersion  [] Bariatric  [] Total Pressure Relief  [] Other:      Discharge Plan:  TBD     Patient/Caregiver Teaching:    [] Indicates understanding        [x] Needs reinforcement  [] Unsuccessful                          [] Verbal Understanding  [] Demonstrated understanding            [] No evidence of learning  [] Refused teaching                               [] N/A       Electronically signed by Frank Gallegos RN, CWON on 11/13/2017 at 2:12 PM

## 2017-11-13 NOTE — PROGRESS NOTES
[facility] - Daily Progress Note  Today's Date and Time: 11/13/2017, 10:37 AM    Impression :   · T4 to 5, T9 to 10, T12 and L1 discitis and osteomyelitis with possible pathologic fractures - status post CT-guided biopsy of T12-L1 disc 11/10/17 and culture with MSSA  · Recent MSSA sepsis - completed 6 weeks of vancomycin on November 30. · Left gluteal, stage III, pressure related ulcer, currently not infected -status post bedside debridement 11/13/17  · Recent left knee septic arthritis -status post drainage. · Abdominal aortic aneurysm, 3.9 cm  · Underlying COPD    Recommendations:   · The patient at this point in time is not at all interested in a surgical approach, and the daughter reported that the patient rather die than have surgery. · Continue intravenous antimicrobial therapy with vancomycin through January 30, 2017 to complete 12 weeks of antimicrobial therapy  · I have discussed with the patient, and the nurse, and the care navigation team tried to see if we can get him evaluated by [facility]  · I will continue to follow his progress and adjust therapy accordingly    Interval History:   Allyson Reyes is a 76y.o.-year-old male who was initially admitted on 11/7/2017. Jeff Koo is known to me from his recent hospitalization when he had methicillin susceptible Staphylococcus aureus sepsis, MSSA UTI related to her left knee septic arthritis for which he underwent drainage. The patient also had left knee pseudogout and he had pretty significant back pain and I have a high clinical suspicion for discitis/osteomyelitis. The patient did receive vancomycin through October 31 to complete a six-week course. 5 days prior to admission the patient reported worsening back pain so severe that it was limiting his ability to ambulate.   He did not have any associated fevers or chills and workup in the ED with a CAT scan showed show multilevel discitis and osteomyelitis as well as possible pathologic fractures. The patient had IR guided biopsy done on November 10, 2017    Current evaluation: 2017  The patient is seen and evaluated at bedside he is awake and alert in no acute distress. He does still have significant pain in the back and just recently had some Dilaudid and has some relief. This morning he did have the gluteal ulceration debrided by the surgical team.  He is still very concerned about his pain control and I discussed with him potentially going to an LTAC for further care and he'll be able to continue IV pain medication. Physical Examination :   BP (!) 147/61   Pulse 103   Temp 97.7 °F (36.5 °C) (Oral)   Resp 22   Ht 6' 1\" (1.854 m)   Wt 243 lb 9.6 oz (110.5 kg)   SpO2 95%   BMI 32.14 kg/m²     Temperature Range: Temp: 97.7 °F (36.5 °C) Temp  Av.3 °F (36.8 °C)  Min: 97.7 °F (36.5 °C)  Max: 99 °F (37.2 °C)     Physical Exam   Constitutional: He is oriented to person, place, and time. HENT:   Head: Normocephalic and atraumatic. Cardiovascular: Normal rate and regular rhythm. Pulmonary/Chest: Effort normal and breath sounds normal.   Abdominal: Soft. Bowel sounds are normal.   Musculoskeletal: Normal range of motion. Neurological: He is alert and oriented to person, place, and time. Skin: Skin is warm and dry.    The dressing over the left gluteal ulceration was not removed       Laboratory data:   I have independently reviewed the following labs:  CBC with Differential:   Recent Labs      17   0654  17   0637   WBC  7.7  7.5   HGB  9.8*  9.8*   HCT  30.2*  29.8*   PLT  320  322   LYMPHOPCT  9  9   MONOPCT  8  9     BMP:   Recent Labs      17   0637  17   0631   NA  135  135   K  3.4*  4.0   CL  99  96*   CO2  24  28   BUN  9  9   CREATININE  0.47*  0.52*     Hepatic Function Panel:   Recent Labs      17   0637   PROT  6.3*   LABALBU  2.3*   BILITOT  0.50   ALKPHOS  74   ALT  24   AST  20        No results for Final   cefoxitin screen  NOT REPORTED Final   ciprofloxacin  NOT REPORTED Final   clindamycin Sensitive <=0.25 SUSCEPTIBLE Final   erythromycin Sensitive <=0.25 SUSCEPTIBLE Final   gentamicin Sensitive <=0.5 SUSCEPTIBLE Final   levofloxacin Sensitive <=0.12 SUSCEPTIBLE Final   linezolid  NOT REPORTED Final   moxifloxacin  NOT REPORTED Final   nitrofurantoin  NOT REPORTED Final   oxacillin Sensitive <=0.25 SUSCEPTIBLE Final   penicillin Resistant >=0.5 RESISTANT Final   rifampin  NOT REPORTED Final   tetracycline Sensitive <=1 SUSCEPTIBLE Final   tigecycline  NOT REPORTED Final   trimethoprim-sulfamethoxazole Sensitive <=10 SUSCEPTIBLE Final   vancomycin  NOT REPORTED Final     Cult,Wound [[id number]] (Abnormal) Collected: 11/07/17 2001   Order Status: Completed Specimen: Buttocks Updated: 11/09/17 8276    Specimen Description . BUTTOCK, LEFT    Special Requests NOT REPORTED    Direct Exam FEW NEUTROPHILS (A)    Direct Exam MODERATE BUDDING YEAST (A)    Direct Exam FEW GRAM POSITIVE COCCI IN PAIRS (A)    Culture PRESUMPTIVE CANDIDA ALBICANS HEAVY GROWTH (A)    Culture NORMAL SKIN JOB (A)    Culture Performed at [address] [phone]    Status FINAL 11/09/2017   MRSA SCREENING CULTURE ONLY [[id number]] Collected: 11/08/17 2228   Order Status: Completed Specimen: Nares Updated: 11/09/17 0027    Specimen Description . NARES Performed at [address]    Specimen Description  [id number] [phone]    Special Requests NOT REPORTED    Culture DUE TO THE SPECIMEN TYPE, THE ORDER WAS CANCELED AND REORDERED. PLEASE REFER TO:    Culture [id number]    Culture Performed at [address] [phone]    Status FINAL 11/09/2017   Urine culture [[id number]] Collected: 11/07/17 2126   Order Status: Completed Specimen: Urine voided Updated: 11/08/17 2313    Specimen Description . CLEAN CATCH URINE Performed at [address] [facility]    Specimen Description Cheryl Angel0 [facility] [phone]    Special Requests NOT REPORTED    Culture NO SIGNIFICANT GROWTH    Culture Performed at [facility] [address] [facility], [address] [place] [address] [phone]    Status FINAL 11/08/2017     Medications:      sodium chloride flush  10 mL Intravenous 2 times per day    fentaNYL  1 patch Transdermal Q72H    apixaban  5 mg Oral BID    ferrous sulfate  325 mg Oral BID WC    tiotropium  18 mcg Inhalation Daily    vancomycin  1,500 mg Intravenous Q12H    vancomycin (VANCOCIN) intermittent dosing (placeholder)   Other RX Placeholder    cefepime  2 g Intravenous Q12H    albuterol  2.5 mg Nebulization 4x Daily    atorvastatin  20 mg Oral Daily    doxazosin  4 mg Oral Nightly    pantoprazole  40 mg Oral QAM AC    tamsulosin  0.4 mg Oral Daily    sodium chloride flush  10 mL Intravenous 2 times per day    cetirizine  10 mg Oral Daily    guaiFENesin  400 mg Oral Q6H     Thank you for allowing us to participate in the care of this patient. Please call with questions. Sharyn Quinones M.D.   [employer] messaging [phone]

## 2017-11-13 NOTE — PROGRESS NOTES
GI Progress notes    11/13/2017   3:32 PM    Name:  Melita Streeter  MRN:    [id number]     [place]:     [de-identified]   Room:  [address] [place] [address] Day: 6     Admit Date: 11/7/2017  3:17 PM  PCP: Tali Pablo MD    Subjective:     C/C:   Chief Complaint   Patient presents with    Back Pain       Interval History: Status: improved. Seen with his wife in the room  Overall stable  Stool is pending for OB has been collected  Has mild abd bloating  Has no Bleeding or any melena  HGB is rel stable  Mild SOB    ROS:  Constitutional: negative for chills, fevers and sweats    Gastrointestinal: mild abdominal pain, constipation, diarrhea, nausea and vomiting      Medications: Allergies: Allergies   Allergen Reactions    Latex Rash    Ibuprofen      Causes ankle swelling    Penicillins Other (See Comments)     Reaction as a child.     Cephalosporins Rash       Current Meds:   sodium chloride flush 0.9 % injection 10 mL 2 times per day   sodium chloride flush 0.9 % injection 10 mL PRN   fentaNYL (DURAGESIC) 25 MCG/HR 1 patch Q72H   apixaban (ELIQUIS) tablet 5 mg BID   ferrous sulfate EC tablet 325 mg BID WC   HYDROmorphone (DILAUDID) injection 0.5 mg Q4H PRN   tiotropium (SPIRIVA) inhalation capsule 18 mcg Daily   vancomycin (VANCOCIN) 1,500 mg in dextrose 5 % 250 mL IVPB Q12H   vancomycin (VANCOCIN) intermittent dosing (placeholder) RX Placeholder   ceFEPIme (MAXIPIME) 2 g in sterile water 20 mL IV syringe Q12H   albuterol (PROVENTIL) nebulizer solution 2.5 mg 4x Daily   atorvastatin (LIPITOR) tablet 20 mg Daily   doxazosin (CARDURA) tablet 4 mg Nightly   oxyCODONE (ROXICODONE) immediate release tablet 10 mg Q8H PRN   pantoprazole (PROTONIX) tablet 40 mg QAM AC   tamsulosin (FLOMAX) capsule 0.4 mg Daily   sodium chloride flush 0.9 % injection 10 mL 2 times per day   sodium chloride flush 0.9 % injection 10 mL PRN   acetaminophen (TYLENOL) tablet 650 mg Q4H PRN   magnesium hydroxide (MILK OF MAGNESIA) 400 MG/5ML suspension 30 mL Daily PRN   ondansetron (ZOFRAN) injection 4 mg Q6H PRN   potassium chloride (KLOR-CON M) extended release tablet 40 mEq PRN   Or    potassium chloride 20 MEQ/15ML (10%) oral solution 40 mEq PRN   Or    potassium chloride 10 mEq/100 mL IVPB (Peripheral Line) PRN   cetirizine (ZYRTEC) tablet 10 mg Daily   guaiFENesin (ROBITUSSIN) 100 MG/5ML oral solution 400 mg Q6H   benzocaine-menthol (CEPACOL) 1 lozenge Q2H PRN       Data:     Code Status:  Full Code    History reviewed. No pertinent family history. Social History     Social History    Marital status:      Spouse name: N/A    Number of children: N/A    Years of education: N/A     Occupational History    Not on file. Social History Main Topics    Smoking status: Former Smoker    Smokeless tobacco: Never Used    Alcohol use 4.2 oz/week     7 Cans of beer per week    Drug use: No    Sexual activity: Not Currently     Other Topics Concern    Not on file     Social History Narrative    No narrative on file       Vitals:  BP (!) 154/63   Pulse 109   Temp 98.1 °F (36.7 °C) (Oral)   Resp 20   Ht 6' 1\" (1.854 m)   Wt 243 lb 9.6 oz (110.5 kg)   SpO2 98%   BMI 32.14 kg/m²   Temp (24hrs), Av.3 °F (36.8 °C), Min:97.7 °F (36.5 °C), Max:99 °F (37.2 °C)    No results for input(s): POCGLU in the last 72 hours. I/O (24Hr):   No intake or output data in the 24 hours ending 17 1532    Labs:      CBC: Lab Results   Component Value Date    WBC 7.5 2017    RBC 3.69 2017    HGB 9.8 2017    HCT 29.8 2017    MCV 80.7 2017    MCH 26.6 2017    MCHC 33.0 2017    RDW 16.6 2017     2017    MPV 7.3 2017     CBC with Differential:  Lab Results   Component Value Date    WBC 7.5 2017    RBC 3.69 2017    HGB 9.8 2017    HCT 29.8 2017     2017    MCV 80.7 2017    MCH 26.6 2017    MCHC 33.0 2017    RDW 16.6 2017 LYMPHOPCT 9 11/12/2017    MONOPCT 9 11/12/2017    BASOPCT 0 11/12/2017    MONOSABS 0.70 11/12/2017    LYMPHSABS 0.70 11/12/2017    EOSABS 0.70 11/12/2017    BASOSABS 0.00 11/12/2017    DIFFTYPE NOT REPORTED 11/12/2017     Hemoglobin/Hematocrit:  Lab Results   Component Value Date    HGB 9.8 11/12/2017    HCT 29.8 11/12/2017     CMP:  Lab Results   Component Value Date     11/13/2017    K 4.0 11/13/2017    CL 96 11/13/2017    CO2 28 11/13/2017    BUN 9 11/13/2017    CREATININE 0.52 11/13/2017    GFRAA >60 11/13/2017    LABGLOM >60 11/13/2017    GLUCOSE 115 11/13/2017    PROT 6.3 11/12/2017    LABALBU 2.3 11/12/2017    CALCIUM 8.6 11/13/2017    BILITOT 0.50 11/12/2017    ALKPHOS 74 11/12/2017    AST 20 11/12/2017    ALT 24 11/12/2017     BMP:  Lab Results   Component Value Date     11/13/2017    K 4.0 11/13/2017    CL 96 11/13/2017    CO2 28 11/13/2017    BUN 9 11/13/2017    LABALBU 2.3 11/12/2017    CREATININE 0.52 11/13/2017    CALCIUM 8.6 11/13/2017    GFRAA >60 11/13/2017    LABGLOM >60 11/13/2017    GLUCOSE 115 11/13/2017     PT/INR:    Lab Results   Component Value Date    PROTIME 12.2 11/09/2017    INR 1.2 11/09/2017     PTT:    Lab Results   Component Value Date    APTT 33.0 11/09/2017   [APTT}    Physical Examination:        General appearance: alert, cooperative and no distress  Mental Status: oriented to person, place and time and anxious affect    Abdomen: soft, bloated nontender, nondistended, bowel sounds present     Assessment:        Primary Problem  Acute osteomyelitis of thoracic spine [facility])     Active Hospital Problems    Diagnosis Date Noted    Decubitus ulcer of sacral region, stage 3 ([facility] [L89.153] 11/12/2017    Iron deficiency anemia due to chronic blood loss [D50.0]     Iron deficiency anemia [D50.9] 11/11/2017    Class 2 obesity due to excess calories with serious comorbidity in adult [E66.09]     [facility]el[facility] [M46.24] 11/08/2017    Thoracic

## 2017-11-13 NOTE — PROGRESS NOTES
Progress Note    11/13/2017 5:47 PM  Subjective:   Admit Date: 11/7/2017  PCP: Howard Michel MD  Date of Discharge: Unknown. Awaiting transfer to [facility]    Medications:   Scheduled Meds:   sodium chloride flush  10 mL Intravenous 2 times per day    fentaNYL  1 patch Transdermal Q72H    apixaban  5 mg Oral BID    ferrous sulfate  325 mg Oral BID WC    tiotropium  18 mcg Inhalation Daily    vancomycin  1,500 mg Intravenous Q12H    vancomycin (VANCOCIN) intermittent dosing (placeholder)   Other RX Placeholder    cefepime  2 g Intravenous Q12H    albuterol  2.5 mg Nebulization 4x Daily    atorvastatin  20 mg Oral Daily    doxazosin  4 mg Oral Nightly    pantoprazole  40 mg Oral QAM AC    tamsulosin  0.4 mg Oral Daily    sodium chloride flush  10 mL Intravenous 2 times per day    cetirizine  10 mg Oral Daily    guaiFENesin  400 mg Oral Q6H     Continuous Infusions:   PRN Meds:sodium chloride flush, HYDROmorphone, oxyCODONE HCl, sodium chloride flush, acetaminophen, magnesium hydroxide, ondansetron, potassium chloride **OR** potassium chloride **OR** potassium chloride, benzocaine-menthol    Diet:   Diet: Dietary Nutrition Supplements: Standard High Calorie Oral Supplement  DIET GENERAL;  Dietary Nutrition Supplements: Wound Healing Oral Supplement    Subjective:   Systemic or Specific Complaints:Pain Control    Objective:     Patient Vitals for the past 24 hrs:   BP Temp Temp src Pulse Resp SpO2   11/13/17 1606 (!) 171/68 98.1 °F (36.7 °C) Oral 104 20 94 %   11/13/17 1212 (!) 154/63 98.1 °F (36.7 °C) Oral 109 20 98 %   11/13/17 0823 (!) 147/61 97.7 °F (36.5 °C) Oral 103 22 95 %   11/12/17 2335 (!) 135/53 98.8 °F (37.1 °C) Oral 90 18 92 %   11/12/17 2001 (!) 147/57 98.1 °F (36.7 °C) Oral 95 18 93 %     No intake/output data recorded. No intake/output data recorded. General: alert, appears stated age, cooperative, moderate distress and morbidly obese   Wound:     Motion: Painful range of Motion in affected extremity   DVT Exam: No evidence of DVT seen on physical exam.  Negative Kingston's sign. No cords or calf tenderness. Additional exam: No evidence of radiculopathy or myopathy motor sensory reflex. Rate 3 decubitus debrided today at bedside by general surgery    Data Review  CBC:   Recent Labs      11/11/17   0654  11/12/17   0637   WBC  7.7  7.5   HGB  9.8*  9.8*   PLT  320  322     BMP:  Recent Labs      11/11/17   0654  11/12/17   0637  11/13/17   0631   NA  136  135  135   K  3.7  3.4*  4.0   CL  99  99  96*   CO2  24  24  28   BUN  9  9  9   CREATININE  0.45*  0.47*  0.52*   GLUCOSE  115*  123*  115*     Hepatic: Recent Labs      11/12/17   0637   AST  20   ALT  24   BILITOT  0.50   ALKPHOS  74     Troponin: No results for input(s): TROPONINI in the last 72 hours. BNP: No results for input(s): BNP in the last 72 hours. Lipids: No results for input(s): CHOL, HDL in the last 72 hours. Invalid input(s): LDLCALCU  INR: No results for input(s): INR in the last 72 hours. Assessment:   Debora Roldan has slightly improved from yesterday. Pain is well controlled. He has no nausea and no vomiting. Current activity is up with assistance  Incision:       Plan:      1:  Case discussed with the family at bedside. BONNY a positive on cultures with IV vancomycin planned 6 weeks. For pain control requires IV narcotics as well. Family and patient were counseled that if abscess develops with possible neurological involvement didn't certainly surgery would be appropriate. This would necessitate a thoracotomy, anterior thoracic discectomy, autograft interbody fusion  2:  Continue Deep venous thrombosis prophylaxis  3:  Continue Pain Control    4.   Office follow-up with thoracolumbar CT        Electronically signed by Marcelina Severance, MD on 11/13/2017 at 5:47 PM

## 2017-11-14 PROBLEM — M86.9 OSTEOMYELITIS (HCC): Status: ACTIVE | Noted: 2017-11-14

## 2017-11-14 LAB
ABSOLUTE EOS #: 0.7 K/UL (ref 0–0.4)
ABSOLUTE IMMATURE GRANULOCYTE: ABNORMAL K/UL (ref 0–0.3)
ABSOLUTE LYMPH #: 0.9 K/UL (ref 1–4.8)
ABSOLUTE MONO #: 0.8 K/UL (ref 0.2–0.8)
ANION GAP SERPL CALCULATED.3IONS-SCNC: 11 MMOL/L (ref 9–17)
BASOPHILS # BLD: 0 %
BASOPHILS ABSOLUTE: 0 K/UL (ref 0–0.2)
BUN BLDV-MCNC: 11 MG/DL (ref 8–23)
BUN/CREAT BLD: 20 (ref 9–20)
CALCIUM SERPL-MCNC: 8.5 MG/DL (ref 8.6–10.4)
CHLORIDE BLD-SCNC: 95 MMOL/L (ref 98–107)
CO2: 27 MMOL/L (ref 20–31)
CREAT SERPL-MCNC: 0.56 MG/DL (ref 0.7–1.2)
DIFFERENTIAL TYPE: ABNORMAL
EOSINOPHILS RELATIVE PERCENT: 8 %
GFR AFRICAN AMERICAN: >60 ML/MIN
GFR NON-AFRICAN AMERICAN: >60 ML/MIN
GFR SERPL CREATININE-BSD FRML MDRD: ABNORMAL ML/MIN/{1.73_M2}
GFR SERPL CREATININE-BSD FRML MDRD: ABNORMAL ML/MIN/{1.73_M2}
GLUCOSE BLD-MCNC: 128 MG/DL (ref 70–99)
HCT VFR BLD CALC: 28.9 % (ref 41–53)
HEMOGLOBIN: 9.5 G/DL (ref 13.5–17.5)
IMMATURE GRANULOCYTES: ABNORMAL %
LYMPHOCYTES # BLD: 9 %
MCH RBC QN AUTO: 26.3 PG (ref 26–34)
MCHC RBC AUTO-ENTMCNC: 32.8 G/DL (ref 31–37)
MCV RBC AUTO: 80.2 FL (ref 80–100)
MONOCYTES # BLD: 9 %
PDW BLD-RTO: 16.5 % (ref 11.5–14.5)
PLATELET # BLD: 300 K/UL (ref 130–400)
PLATELET ESTIMATE: ABNORMAL
PMV BLD AUTO: 7.4 FL (ref 6–12)
POTASSIUM SERPL-SCNC: 3.9 MMOL/L (ref 3.7–5.3)
RBC # BLD: 3.61 M/UL (ref 4.5–5.9)
RBC # BLD: ABNORMAL 10*6/UL
SEG NEUTROPHILS: 74 %
SEGMENTED NEUTROPHILS ABSOLUTE COUNT: 7.2 K/UL (ref 1.8–7.7)
SODIUM BLD-SCNC: 133 MMOL/L (ref 135–144)
SURGICAL PATHOLOGY REPORT: NORMAL
WBC # BLD: 9.7 K/UL (ref 3.5–11)
WBC # BLD: ABNORMAL 10*3/UL

## 2017-11-14 PROCEDURE — 94760 N-INVAS EAR/PLS OXIMETRY 1: CPT

## 2017-11-14 PROCEDURE — 85025 COMPLETE CBC W/AUTO DIFF WBC: CPT

## 2017-11-14 PROCEDURE — 80048 BASIC METABOLIC PNL TOTAL CA: CPT

## 2017-11-14 PROCEDURE — 2580000003 HC RX 258: Performed by: RADIOLOGY

## 2017-11-14 PROCEDURE — 94640 AIRWAY INHALATION TREATMENT: CPT

## 2017-11-14 PROCEDURE — 36415 COLL VENOUS BLD VENIPUNCTURE: CPT

## 2017-11-14 PROCEDURE — 97116 GAIT TRAINING THERAPY: CPT

## 2017-11-14 PROCEDURE — 2580000003 HC RX 258

## 2017-11-14 PROCEDURE — 6360000002 HC RX W HCPCS: Performed by: INTERNAL MEDICINE

## 2017-11-14 PROCEDURE — 2060000000 HC ICU INTERMEDIATE R&B

## 2017-11-14 PROCEDURE — 6360000002 HC RX W HCPCS

## 2017-11-14 PROCEDURE — 2580000003 HC RX 258: Performed by: INTERNAL MEDICINE

## 2017-11-14 PROCEDURE — 99232 SBSQ HOSP IP/OBS MODERATE 35: CPT | Performed by: INTERNAL MEDICINE

## 2017-11-14 PROCEDURE — 6370000000 HC RX 637 (ALT 250 FOR IP): Performed by: INTERNAL MEDICINE

## 2017-11-14 PROCEDURE — 97530 THERAPEUTIC ACTIVITIES: CPT

## 2017-11-14 PROCEDURE — 2700000000 HC OXYGEN THERAPY PER DAY

## 2017-11-14 RX ORDER — GUAIFENESIN 100 MG/5ML
400 SOLUTION ORAL EVERY 6 HOURS
Qty: 1200 ML | Refills: 0 | Status: SHIPPED | OUTPATIENT
Start: 2017-11-15 | End: 2018-01-22 | Stop reason: ALTCHOICE

## 2017-11-14 RX ORDER — LANOLIN ALCOHOL/MO/W.PET/CERES
325 CREAM (GRAM) TOPICAL 2 TIMES DAILY WITH MEALS
Qty: 90 TABLET | Refills: 3 | Status: SHIPPED | OUTPATIENT
Start: 2017-11-15 | End: 2018-02-07 | Stop reason: ALTCHOICE

## 2017-11-14 RX ORDER — FENTANYL 25 UG/H
1 PATCH TRANSDERMAL
Qty: 10 PATCH | Refills: 0 | Status: SHIPPED | OUTPATIENT
Start: 2017-11-15 | End: 2017-12-15

## 2017-11-14 RX ORDER — ONDANSETRON 2 MG/ML
4 INJECTION INTRAMUSCULAR; INTRAVENOUS EVERY 6 HOURS PRN
Qty: 15 ML | DISCHARGE
Start: 2017-11-14 | End: 2018-02-07 | Stop reason: ALTCHOICE

## 2017-11-14 RX ADMIN — HYDROMORPHONE HYDROCHLORIDE 0.5 MG: 1 INJECTION, SOLUTION INTRAMUSCULAR; INTRAVENOUS; SUBCUTANEOUS at 16:55

## 2017-11-14 RX ADMIN — ALBUTEROL SULFATE 2.5 MG: 2.5 SOLUTION RESPIRATORY (INHALATION) at 14:42

## 2017-11-14 RX ADMIN — TIOTROPIUM BROMIDE 18 MCG: 18 CAPSULE ORAL; RESPIRATORY (INHALATION) at 07:27

## 2017-11-14 RX ADMIN — OXYCODONE HYDROCHLORIDE 10 MG: 5 TABLET ORAL at 12:42

## 2017-11-14 RX ADMIN — HYDROMORPHONE HYDROCHLORIDE 0.5 MG: 1 INJECTION, SOLUTION INTRAMUSCULAR; INTRAVENOUS; SUBCUTANEOUS at 01:32

## 2017-11-14 RX ADMIN — VANCOMYCIN HYDROCHLORIDE 1500 MG: 5 INJECTION, POWDER, LYOPHILIZED, FOR SOLUTION INTRAVENOUS at 07:30

## 2017-11-14 RX ADMIN — GUAIFENESIN 400 MG: 100 SOLUTION ORAL at 12:44

## 2017-11-14 RX ADMIN — APIXABAN 5 MG: 5 TABLET, FILM COATED ORAL at 21:07

## 2017-11-14 RX ADMIN — PANTOPRAZOLE SODIUM 40 MG: 40 TABLET, DELAYED RELEASE ORAL at 05:57

## 2017-11-14 RX ADMIN — ALBUTEROL SULFATE 2.5 MG: 2.5 SOLUTION RESPIRATORY (INHALATION) at 19:53

## 2017-11-14 RX ADMIN — ATORVASTATIN CALCIUM 20 MG: 20 TABLET, FILM COATED ORAL at 08:15

## 2017-11-14 RX ADMIN — VANCOMYCIN HYDROCHLORIDE 1500 MG: 5 INJECTION, POWDER, LYOPHILIZED, FOR SOLUTION INTRAVENOUS at 20:18

## 2017-11-14 RX ADMIN — WATER 2 G: 1 INJECTION INTRAMUSCULAR; INTRAVENOUS; SUBCUTANEOUS at 17:03

## 2017-11-14 RX ADMIN — CETIRIZINE HYDROCHLORIDE 10 MG: 10 TABLET, FILM COATED ORAL at 08:15

## 2017-11-14 RX ADMIN — OXYCODONE HYDROCHLORIDE 10 MG: 5 TABLET ORAL at 22:37

## 2017-11-14 RX ADMIN — Medication 10 ML: at 08:18

## 2017-11-14 RX ADMIN — FERROUS SULFATE TAB EC 325 MG (65 MG FE EQUIVALENT) 325 MG: 325 (65 FE) TABLET DELAYED RESPONSE at 08:15

## 2017-11-14 RX ADMIN — HYDROMORPHONE HYDROCHLORIDE 0.5 MG: 1 INJECTION, SOLUTION INTRAMUSCULAR; INTRAVENOUS; SUBCUTANEOUS at 21:07

## 2017-11-14 RX ADMIN — FERROUS SULFATE TAB EC 325 MG (65 MG FE EQUIVALENT) 325 MG: 325 (65 FE) TABLET DELAYED RESPONSE at 16:56

## 2017-11-14 RX ADMIN — WATER 2 G: 1 INJECTION INTRAMUSCULAR; INTRAVENOUS; SUBCUTANEOUS at 05:57

## 2017-11-14 RX ADMIN — HYDROMORPHONE HYDROCHLORIDE 0.5 MG: 1 INJECTION, SOLUTION INTRAMUSCULAR; INTRAVENOUS; SUBCUTANEOUS at 08:11

## 2017-11-14 RX ADMIN — ALBUTEROL SULFATE 2.5 MG: 2.5 SOLUTION RESPIRATORY (INHALATION) at 07:27

## 2017-11-14 RX ADMIN — GUAIFENESIN 400 MG: 100 SOLUTION ORAL at 05:57

## 2017-11-14 RX ADMIN — DOXAZOSIN 4 MG: 4 TABLET ORAL at 21:07

## 2017-11-14 RX ADMIN — GUAIFENESIN 400 MG: 100 SOLUTION ORAL at 16:56

## 2017-11-14 RX ADMIN — TAMSULOSIN HYDROCHLORIDE 0.4 MG: 0.4 CAPSULE ORAL at 08:15

## 2017-11-14 RX ADMIN — OXYCODONE HYDROCHLORIDE 10 MG: 5 TABLET ORAL at 03:40

## 2017-11-14 RX ADMIN — ALBUTEROL SULFATE 2.5 MG: 2.5 SOLUTION RESPIRATORY (INHALATION) at 10:53

## 2017-11-14 RX ADMIN — APIXABAN 5 MG: 5 TABLET, FILM COATED ORAL at 08:15

## 2017-11-14 RX ADMIN — Medication 10 ML: at 21:07

## 2017-11-14 RX ADMIN — GUAIFENESIN 400 MG: 100 SOLUTION ORAL at 01:32

## 2017-11-14 ASSESSMENT — PAIN SCALES - GENERAL
PAINLEVEL_OUTOF10: 4
PAINLEVEL_OUTOF10: 4
PAINLEVEL_OUTOF10: 7
PAINLEVEL_OUTOF10: 4
PAINLEVEL_OUTOF10: 8
PAINLEVEL_OUTOF10: 5
PAINLEVEL_OUTOF10: 7
PAINLEVEL_OUTOF10: 8

## 2017-11-14 ASSESSMENT — PAIN DESCRIPTION - PAIN TYPE: TYPE: ACUTE PAIN

## 2017-11-14 ASSESSMENT — PAIN DESCRIPTION - ORIENTATION: ORIENTATION: RIGHT;UPPER

## 2017-11-14 ASSESSMENT — PAIN DESCRIPTION - LOCATION: LOCATION: BACK

## 2017-11-14 NOTE — PROGRESS NOTES
[address] [address] Continence Nurse  Consult Note       NAME:  Jeffy Lockhart  MEDICAL RECORD NUMBER:  [id number]  AGE: 76 y.o. GENDER: male  : 1940  TODAY'S DATE:  2017    Subjective:     Reason for WOCN Evaluation and Assessment: buttock ST 3 pressure injury present upon admission. Debrided 17. Jeffy Lockhart is a 76 y.o. male referred by:   [] Physician  [] Nursing  [] Other:     Wound Identification:  Wound Type: pressure  Contributing Factors: chronic pressure, decreased mobility, shear force and obesity        PAST MEDICAL HISTORY        Diagnosis Date    Arthritis     Left knee     Chronic back pain     and knee pain/ sees chiropractor    COPD (chronic obstructive pulmonary disease) (HCC)     Hyperlipidemia     Lumbar stenosis     L4-L5    MSSA (methicillin susceptible Staphylococcus aureus)     Prostate enlargement     Sleep apnea     wears c-pap    Thoracic back pain     T4-T5        PAST SURGICAL HISTORY    Past Surgical History:   Procedure Laterality Date    APPENDECTOMY      KNEE ARTHROSCOPY Left 2017    LEFT KNEE ARTHROSCOPIC LAVAGE, synovectomy; insertion drain    PILONIDAL CYST EXCISION      VT KNEE SCOPE,DIAGNOSTIC Left 2017    LEFT KNEE ARTHROSCOPIC LAVAGE performed by Misbah Robles MD at [address] [place] [address]    History reviewed. No pertinent family history. SOCIAL HISTORY    Social History   Substance Use Topics    Smoking status: Former Smoker    Smokeless tobacco: Never Used    Alcohol use 4.2 oz/week     7 Cans of beer per week       ALLERGIES    Allergies   Allergen Reactions    Latex Rash    Ibuprofen      Causes ankle swelling    Penicillins Other (See Comments)     Reaction as a child.     Cephalosporins Rash           Objective:      /67   Pulse 88   Temp 98.2 °F (36.8 °C) (Oral)   Resp 22   Ht 6' 1\" (1.854 m)   Wt 243 lb 9.6 oz (110.5 kg)   SpO2 100%   BMI 32.14 kg/m²   Jose Risk Score: Jose Scale Score: Pressure Relief  [] Other:     Discharge Plan:  Placement for patient upon discharge: skilled nursing: [facility]       Patient/Caregiver Teaching:  Patient continues to have difficulty maintaining position off of wound. Reports slept side lying last night and has increased intolerable back pain today. Reviewed frequent repositioning critical to wound healing and prevention of further wounds.    [] Indicates understanding       [x] Needs reinforcement  [] Unsuccessful      [] Verbal Understanding  [] Demonstrated understanding       [] No evidence of learning  [] Refused teaching         [] N/A       Electronically signed by Radha Roy RN, CWON on 11/14/2017 at 2:46 PM

## 2017-11-14 NOTE — PROGRESS NOTES
gown once in chair. Patient in so much pain assisted back to bed with RW. Pillow positioning for comfort and Air mattress filled back up to reduce risk of pressure sores. Patient Goals    Patient goals  Pt goal is to be able to mobilize. Short term goals   Time Frame for Short term goals 12 tx's   Short term goal 1 Bed mobility independnet   Short term goal 2 Transfers independent   Short term goal 3 Amb x 25 ft with r.walker independnet   Short term goal 4 Progress mobility as medically able to tolerate. Conditions Requiring Skilled Therapeutic Intervention   Body structures, Functions, Activity limitations Decreased functional mobility ; Decreased strength;Decreased endurance;Decreased balance   Assessment pain limits pt progress with mobility. Patient did not tolerate treatment well d/t back pain and is appropriate for SNF. Prognosis Good   REQUIRES PT FOLLOW UP Yes   Discharge Recommendations Subacute/Skilled Nursing Facility   Activity Tolerance   Activity Tolerance Patient limited by endurance; Patient limited by pain   Plan   Times per week 1-2x/day  5-6x/ week    Current Treatment Recommendations Strengthening;Balance Training;Functional Mobility Training;Transfer Training;Gait Training;Neuromuscular Re-education; EnduranceTraining;Patient/Caregiver Education & Training; Safety Education & Training;Positioning   Safety Devices   Type of devices All fall risk precautions in place;Call light within reach; Left in bed;Nurse notified     Estee Rutherford PTA  Time: 1150- 2033

## 2017-11-14 NOTE — PROGRESS NOTES
General Surgery:  Daily Progress Note          PATIENT NAME: Richie Kanner     TODAY'S DATE: 11/14/2017, 5:08 AM  CC:  Osteomyelitis of spine; stage III decubitus ulcer    SUBJECTIVE:     Pt seen and examined at bedside. Afebrile overnight. S/p incision and debridement of sacral ulcer yesterday. Moving around in bed much easier. Pain slightly better. No acute events overnight. OBJECTIVE:   VITALS:  BP (!) 142/49   Pulse 95   Temp 98.4 °F (36.9 °C) (Oral)   Resp 20   Ht 6' 1\" (1.854 m)   Wt 243 lb 9.6 oz (110.5 kg)   SpO2 93%   BMI 32.14 kg/m²      INTAKE/OUTPUT:    No intake or output data in the 24 hours ending 11/14/17 0508    PHYSICAL EXAM:  General Appearance: awake, alert, oriented, in no acute distress  HEENT:  Normocephalic, atraumatic, mucus membranes moist   Heart: Heart regular rate and rhythm  Lungs: No respiratory distress  Abdomen: Soft, non tender, non distended. Extremities: No cyanosis, pitting edema, rashes noted. Skin: 2 x 3 cm decubitus ulcer on left inferior buttock. Subcutaneous tissue noted, no drainage. Mild fibrinous exudate noted.        Data:  CBC:   Lab Results   Component Value Date    WBC 7.5 11/12/2017    RBC 3.69 11/12/2017    HGB 9.8 11/12/2017    HCT 29.8 11/12/2017    MCV 80.7 11/12/2017    MCH 26.6 11/12/2017    MCHC 33.0 11/12/2017    RDW 16.6 11/12/2017     11/12/2017    MPV 7.3 11/12/2017     BMP:    Lab Results   Component Value Date     11/13/2017    K 4.0 11/13/2017    CL 96 11/13/2017    CO2 28 11/13/2017    BUN 9 11/13/2017    LABALBU 2.3 11/12/2017    CREATININE 0.52 11/13/2017    CALCIUM 8.6 11/13/2017    GFRAA >60 11/13/2017    LABGLOM >60 11/13/2017    GLUCOSE 115 11/13/2017       Radiology Review:     ASSESSMENT:  Active Hospital Problems    Diagnosis Date Noted    Decubitus ulcer of sacral region, stage 3 ([address] [L89.153] 11/12/2017    Iron deficiency anemia due to chronic blood loss [D50.0]     Iron deficiency anemia [D50.9] 11/11/2017

## 2017-11-14 NOTE — PLAN OF CARE
Problem: Pain:  Goal: Pain level will decrease  Pain level will decrease   Outcome: Ongoing  Pt with complaints of back pain, pt medicated as ordered and pt repositioning self, pt noted resting at times with eyes closed  Goal: Control of acute pain  Control of acute pain   Outcome: Ongoing  Pt with complaints of back pain, pt medicated as ordered and pt repositioning self, pt noted resting at times with eyes closed    Problem: Falls - Risk of  Goal: Absence of falls  Outcome: Met This Shift  No attempts at unassisted ambulation, needs met, no injuries    Problem: Risk for Impaired Skin Integrity  Goal: Tissue integrity - skin and mucous membranes  Structural intactness and normal physiological function of skin and  mucous membranes.    Outcome: Ongoing  Pt with air mattress on bed, heels elevated, pt encouraged to turn every 2 hours, pt with no new areas of breakdown noted

## 2017-11-15 ENCOUNTER — HOSPITAL ENCOUNTER (OUTPATIENT)
Age: 77
Setting detail: SPECIMEN
Discharge: HOME | End: 2017-11-15
Payer: MEDICARE

## 2017-11-15 VITALS
HEART RATE: 107 BPM | OXYGEN SATURATION: 91 % | SYSTOLIC BLOOD PRESSURE: 146 MMHG | BODY MASS INDEX: 32.29 KG/M2 | DIASTOLIC BLOOD PRESSURE: 64 MMHG | WEIGHT: 243.6 LBS | TEMPERATURE: 98.4 F | RESPIRATION RATE: 22 BRPM | HEIGHT: 73 IN

## 2017-11-15 LAB
BUN BLDV-MCNC: 9 MG/DL (ref 8–23)
CREAT SERPL-MCNC: 0.56 MG/DL (ref 0.7–1.2)
CULTURE: ABNORMAL
DIRECT EXAM: ABNORMAL
DIRECT EXAM: ABNORMAL
GFR AFRICAN AMERICAN: >60 ML/MIN
GFR NON-AFRICAN AMERICAN: >60 ML/MIN
GFR SERPL CREATININE-BSD FRML MDRD: ABNORMAL ML/MIN/{1.73_M2}
GFR SERPL CREATININE-BSD FRML MDRD: ABNORMAL ML/MIN/{1.73_M2}
Lab: ABNORMAL
ORGANISM: ABNORMAL
SPECIMEN DESCRIPTION: ABNORMAL
SPECIMEN DESCRIPTION: ABNORMAL
STATUS: ABNORMAL

## 2017-11-15 PROCEDURE — 6370000000 HC RX 637 (ALT 250 FOR IP): Performed by: INTERNAL MEDICINE

## 2017-11-15 PROCEDURE — 6360000002 HC RX W HCPCS

## 2017-11-15 PROCEDURE — 84520 ASSAY OF UREA NITROGEN: CPT

## 2017-11-15 PROCEDURE — 6360000002 HC RX W HCPCS: Performed by: INTERNAL MEDICINE

## 2017-11-15 PROCEDURE — 94760 N-INVAS EAR/PLS OXIMETRY 1: CPT

## 2017-11-15 PROCEDURE — 36415 COLL VENOUS BLD VENIPUNCTURE: CPT

## 2017-11-15 PROCEDURE — 2580000003 HC RX 258

## 2017-11-15 PROCEDURE — 94640 AIRWAY INHALATION TREATMENT: CPT

## 2017-11-15 PROCEDURE — 2580000003 HC RX 258: Performed by: INTERNAL MEDICINE

## 2017-11-15 PROCEDURE — 82565 ASSAY OF CREATININE: CPT

## 2017-11-15 RX ADMIN — OXYCODONE HYDROCHLORIDE 10 MG: 5 TABLET ORAL at 08:11

## 2017-11-15 RX ADMIN — GUAIFENESIN 400 MG: 100 SOLUTION ORAL at 06:40

## 2017-11-15 RX ADMIN — PANTOPRAZOLE SODIUM 40 MG: 40 TABLET, DELAYED RELEASE ORAL at 06:15

## 2017-11-15 RX ADMIN — CETIRIZINE HYDROCHLORIDE 10 MG: 10 TABLET, FILM COATED ORAL at 08:12

## 2017-11-15 RX ADMIN — TIOTROPIUM BROMIDE 18 MCG: 18 CAPSULE ORAL; RESPIRATORY (INHALATION) at 07:51

## 2017-11-15 RX ADMIN — VANCOMYCIN HYDROCHLORIDE 1500 MG: 5 INJECTION, POWDER, LYOPHILIZED, FOR SOLUTION INTRAVENOUS at 07:23

## 2017-11-15 RX ADMIN — WATER 2 G: 1 INJECTION INTRAMUSCULAR; INTRAVENOUS; SUBCUTANEOUS at 06:15

## 2017-11-15 RX ADMIN — HYDROMORPHONE HYDROCHLORIDE 0.5 MG: 1 INJECTION, SOLUTION INTRAMUSCULAR; INTRAVENOUS; SUBCUTANEOUS at 06:10

## 2017-11-15 RX ADMIN — HYDROMORPHONE HYDROCHLORIDE 0.5 MG: 1 INJECTION, SOLUTION INTRAMUSCULAR; INTRAVENOUS; SUBCUTANEOUS at 01:59

## 2017-11-15 RX ADMIN — ATORVASTATIN CALCIUM 20 MG: 20 TABLET, FILM COATED ORAL at 08:11

## 2017-11-15 RX ADMIN — TAMSULOSIN HYDROCHLORIDE 0.4 MG: 0.4 CAPSULE ORAL at 08:11

## 2017-11-15 RX ADMIN — APIXABAN 5 MG: 5 TABLET, FILM COATED ORAL at 08:11

## 2017-11-15 RX ADMIN — HYDROMORPHONE HYDROCHLORIDE 0.5 MG: 1 INJECTION, SOLUTION INTRAMUSCULAR; INTRAVENOUS; SUBCUTANEOUS at 09:41

## 2017-11-15 RX ADMIN — FERROUS SULFATE TAB EC 325 MG (65 MG FE EQUIVALENT) 325 MG: 325 (65 FE) TABLET DELAYED RESPONSE at 08:11

## 2017-11-15 RX ADMIN — ALBUTEROL SULFATE 2.5 MG: 2.5 SOLUTION RESPIRATORY (INHALATION) at 07:48

## 2017-11-15 ASSESSMENT — PAIN SCALES - GENERAL
PAINLEVEL_OUTOF10: 10
PAINLEVEL_OUTOF10: 7
PAINLEVEL_OUTOF10: 4
PAINLEVEL_OUTOF10: 4

## 2017-11-15 NOTE — PROGRESS NOTES
11/14/2017    RBC 3.61 11/14/2017    HGB 9.5 11/14/2017     CMP:  Albumin:    Lab Results   Component Value Date    LABALBU 2.3 11/12/2017     PT/INR:    Lab Results   Component Value Date    PROTIME 12.2 11/09/2017    INR 1.2 11/09/2017     HgBA1c:    Lab Results   Component Value Date    LABA1C 5.9 09/19/2017     PTT: No components found for: LABPTT      Assessment:     Patient Active Problem List   Diagnosis    AAA (abdominal aortic aneurysm) without rupture (HCC)    Primary osteoarthritis of left knee    Knee effusion, left    Staphylococcal septicemia (HCC)    Lumbar and sacral osteoarthritis    Lumbar stenosis with neurogenic claudication    Degenerative spondylolisthesis    DDD (degenerative disc disease), thoracolumbar    COPD (chronic obstructive pulmonary disease) (HCC)    UTI (urinary tract infection)    Elevated PSA    Staphylococcal arthritis of left knee (HCC)    Atrial fibrillation (HCC)    Acute cystitis without hematuria    Thrombocytopenia (HCC)    Severe malnutrition (HCC)    Prediabetes    Acute osteomyelitis of thoracic spine (HCC)    Thoracic discitis    Osteomyelitis of thoracic region ([address]    Decubitus ulcer of left buttock, stage 3 (HCC)    Iron deficiency anemia    Class 2 obesity due to excess calories with serious comorbidity in adult    Iron deficiency anemia due to chronic blood loss    Decubitus ulcer of sacral region, stage 3 ([address]    Osteomyelitis ([address]       Measurements:     11/15/17 0842   Wound 11/15/17 Left LDA for photo only. Use existing pressure injury LDA to chart   Date First Assessed/Time First Assessed: 11/15/17 0843   Wound Event: Pressure  Wound Location Orientation: Left  Wound Description (Comments): LDA for photo only.  Use existing pressure injury LDA to chart  Pre-existing: Yes  Photo Taken: No   Wound Image    Pressure Ulcer 11/07/17 Buttocks Left round, fifty cent piece sized   Date First Assessed/Time First Assessed: 11/07/17 9539 Location: Buttocks  Orientation: Left  Wound Description (Comments): round, fifty cent piece sized  Pre-existing: Yes   Pressure Ulcer Staging Unstagable   Wound Assessment Pink;Slough   Dressing Status Changed   Dressing/Treatment Alginate;Dry dressing   Wound Cleansed Rinsed/Irrigated with saline   Dressing Change Due 11/16/17   Necrotic Type Yellow Fibrin/Slough   Necrotic Amount Large: %   Drainage Amount Moderate   Drainage Description Serosanguinous   Odor None   [place]%Wound Bed 30   Black%Wound Bed 70           Response to treatment:  [address] [place] [address],[address] TOLERATED:407291}     Pain Assessment:  Severity:  {NUMBERS 0-10:20300} / 10  Quality of pain: {PAIN QUALITY:20811}  Wound Pain Timing/Severity: {PAIN ASSESSMENT:02111}  Premedicated: {Yes No X/D:[id number]}      Plan:     Plan of Care:      Specialty Bed Required : {Yes No B/C:1166802447}   [] Low Air Loss   [] Pressure Redistribution  [] Fluid Immersion  [] Bariatric  [] Total Pressure Relief  [] Other:     Discharge Plan:  Placement for patient upon discharge: {Disposition:2890989372}   Hospice Care: {Yes No C/J:9712172763}   Patient appropriate for Outpatient [address]: {Yes No S/N:719386}    Patient/Caregiver Teaching:  Level of patient/caregiver understanding able to:   Level of patient/caregiver understanding able to: ***   [] Indicates understanding       [] Needs reinforcement  [] Unsuccessful      [] Verbal Understanding  [] Demonstrated understanding       [] No evidence of learning  [] Refused teaching         [] N/A       Electronically signed by Unique Slater RN, CWON on 11/15/2017 at 8:45 AM

## 2017-11-15 NOTE — PROGRESS NOTES
Progress Note    11/15/2017 6:56 AM  Subjective:   Admit Date: 11/7/2017  PCP: Denisha Adkins MD  Date of Discharge: Unknown. Awaiting transfer to [facility]    Medications:   Scheduled Meds:   sodium chloride flush  10 mL Intravenous 2 times per day    fentaNYL  1 patch Transdermal Q72H    apixaban  5 mg Oral BID    ferrous sulfate  325 mg Oral BID WC    tiotropium  18 mcg Inhalation Daily    vancomycin  1,500 mg Intravenous Q12H    vancomycin (VANCOCIN) intermittent dosing (placeholder)   Other RX Placeholder    cefepime  2 g Intravenous Q12H    albuterol  2.5 mg Nebulization 4x Daily    atorvastatin  20 mg Oral Daily    doxazosin  4 mg Oral Nightly    pantoprazole  40 mg Oral QAM AC    tamsulosin  0.4 mg Oral Daily    sodium chloride flush  10 mL Intravenous 2 times per day    cetirizine  10 mg Oral Daily    guaiFENesin  400 mg Oral Q6H     Continuous Infusions:   PRN Meds:sodium chloride flush, HYDROmorphone, oxyCODONE HCl, sodium chloride flush, acetaminophen, magnesium hydroxide, ondansetron, potassium chloride **OR** potassium chloride **OR** potassium chloride, benzocaine-menthol    Diet:   Diet: Dietary Nutrition Supplements: Standard High Calorie Oral Supplement  DIET GENERAL;  Dietary Nutrition Supplements: Wound Healing Oral Supplement    Subjective:   Systemic or Specific Complaints:Pain Control    Objective:     Patient Vitals for the past 24 hrs:   BP Temp Temp src Pulse Resp SpO2   11/14/17 2342 (!) 146/64 98.4 °F (36.9 °C) Oral 107 22 94 %   11/14/17 2113 - - - - - 95 %   11/14/17 1951 (!) 158/67 99 °F (37.2 °C) Oral 103 20 95 %   11/14/17 1653 (!) 110/90 98.2 °F (36.8 °C) Oral 121 22 93 %   11/14/17 1444 - - - - - 98 %   11/14/17 1055 - - - - - 100 %   11/14/17 0756 124/67 98.2 °F (36.8 °C) Oral 88 22 96 %   11/14/17 0736 - - - - 20 97 %   11/14/17 0730 - - - - - (!) 89 %     I/O last 3 completed shifts:   In: 400 [P.O.:120; IV Piggyback:280]  Out: 425

## 2017-11-15 NOTE — PLAN OF CARE
Problem: Pain:  Goal: Pain level will decrease  Pain level will decrease   Outcome: Ongoing  Pt c/o back pain, medicated as ordered around the clock, pt noted resting with eyes closed with respirations even and non labored, repositioned for comfort  Goal: Control of acute pain  Control of acute pain   Outcome: Ongoing  Pt c/o back pain, medicated as ordered around the clock, pt noted resting with eyes closed with respirations even and non labored, repositioned for comfort    Problem: Falls - Risk of  Goal: Absence of falls  Outcome: Met This Shift  No attempts at unassited ambulation, needs met with rounds and call light, no injuries    Problem: Risk for Impaired Skin Integrity  Goal: Tissue integrity - skin and mucous membranes  Structural intactness and normal physiological function of skin and  mucous membranes.    Outcome: Ongoing  Pt with stage 3 pressure ulcer, dressing change done, pt encouraged to change positioned, incontinent care given, no new areas noted

## 2017-11-15 NOTE — PROGRESS NOTES
Pt to be transferred to [facility] at 10:00AM by Ericka Post. Facility aware of the transfer.

## 2017-11-15 NOTE — PROGRESS NOTES
Physical Therapy  DATE: 11/15/2017    NAME: Juan Manuel Lane  MRN: [id number]   : 1940    Patient not seen this date for Physical Therapy due to:  [] Blood transfusion in progress  [] Cancel by RN (RN reported that patient is discharging at [facility], but would like writer to attempt if patient is okay for it)   [] Hemodialysis  [x]  Refusal by Patient (Patient reported that he was in too much pain for PT treatment)   [] Spine Precautions   [] Strict Bedrest  [] Surgery  [] Testing      [] Other        [] PT being discontinued at this time. Patient independent. No further needs. [] PT being discontinued at this time as the patient has been transferred to hospice care. No further needs.     [facility] LONG TERM CARE [facility] [place]

## 2017-11-15 NOTE — PROGRESS NOTES
Follow-up spinal osteomyelitis  Still with back pain better with pain medications no fever no chills  Review of system  No fever no chills no GI  complaints no cardiopulmonary complaints no bleeding no polyuria polydipsia no hypoglycemia no TIAs cough with clear phlegm no shortness of breath no PND no sore throat.   Sacral decub stage III debrided  Physical exam vitals stable  Eyes no pallor no jaundice  Lungs air entry, prolonged expiratory phase clear to auscultation  Heart regular rate and rhythm no gallop  Abdomen soft plus bowel sounds without any tenderness without any megaly  Extremities good pulses without edema and negative Homans sign  Neuro alert oriented ×3 with no focal deficit  Assessment and plan  Spinal osteomyelitis with MSSA continue with antibiotics pain control  Severe malnutrition on protein supplements  Iron  Deficiency anemia GI  following outpatient testing  Paroxysmal atrial fibrillation  Obesity  Sacral decubitus stage III  Meds labs reviewed continue with IV pain medications continue physical therapy and occupational therapy to [facility] in the morning see orders

## 2017-11-16 ENCOUNTER — HOSPITAL ENCOUNTER (OUTPATIENT)
Age: 77
Setting detail: SPECIMEN
Discharge: HOME | End: 2017-11-16
Payer: MEDICARE

## 2017-11-16 LAB
CHOLESTEROL/HDL RATIO: 4.4
CHOLESTEROL: 92 MG/DL
HDLC SERPL-MCNC: 21 MG/DL
IRON SATURATION: 13 % (ref 20–55)
IRON: 21 UG/DL (ref 59–158)
LDL CHOLESTEROL: 55 MG/DL (ref 0–130)
SEDIMENTATION RATE, ERYTHROCYTE: 106 MM (ref 0–15)
TOTAL IRON BINDING CAPACITY: 168 UG/DL (ref 250–450)
TRIGL SERPL-MCNC: 80 MG/DL
TSH SERPL DL<=0.05 MIU/L-ACNC: 0.94 MIU/L (ref 0.3–5)
UNSATURATED IRON BINDING CAPACITY: 147 UG/DL (ref 112–347)
VLDLC SERPL CALC-MCNC: ABNORMAL MG/DL (ref 1–30)

## 2017-11-16 PROCEDURE — 85651 RBC SED RATE NONAUTOMATED: CPT

## 2017-11-16 PROCEDURE — 83550 IRON BINDING TEST: CPT

## 2017-11-16 PROCEDURE — 80061 LIPID PANEL: CPT

## 2017-11-16 PROCEDURE — 83540 ASSAY OF IRON: CPT

## 2017-11-16 PROCEDURE — 84443 ASSAY THYROID STIM HORMONE: CPT

## 2017-11-17 ENCOUNTER — HOSPITAL ENCOUNTER (OUTPATIENT)
Age: 77
Setting detail: SPECIMEN
Discharge: HOME | End: 2017-11-17
Payer: MEDICARE

## 2017-11-17 ENCOUNTER — HOSPITAL ENCOUNTER (OUTPATIENT)
Dept: ULTRASOUND IMAGING | Age: 77
Discharge: HOME | End: 2017-11-17
Payer: COMMERCIAL

## 2017-11-17 VITALS
OXYGEN SATURATION: 94 % | DIASTOLIC BLOOD PRESSURE: 83 MMHG | RESPIRATION RATE: 18 BRPM | HEIGHT: 73 IN | SYSTOLIC BLOOD PRESSURE: 143 MMHG | HEART RATE: 104 BPM | BODY MASS INDEX: 31.14 KG/M2 | WEIGHT: 235 LBS | TEMPERATURE: 98.3 F

## 2017-11-17 DIAGNOSIS — J90 PLEURAL EFFUSION: ICD-10-CM

## 2017-11-17 LAB
INR BLD: 1.1
PARTIAL THROMBOPLASTIN TIME: 20.6 SEC (ref 21.3–31.3)
PLATELET # BLD: 258 K/UL (ref 138–453)
PROTHROMBIN TIME: 11.9 SEC (ref 9.4–12.6)
VANCOMYCIN TROUGH DATE LAST DOSE: ABNORMAL
VANCOMYCIN TROUGH DOSE AMOUNT: ABNORMAL
VANCOMYCIN TROUGH TIME LAST DOSE: ABNORMAL
VANCOMYCIN TROUGH: 7.3 UG/ML (ref 10–20)

## 2017-11-17 PROCEDURE — 2580000003 HC RX 258: Performed by: RADIOLOGY

## 2017-11-17 PROCEDURE — 7100000010 HC PHASE II RECOVERY - FIRST 15 MIN

## 2017-11-17 PROCEDURE — 80202 ASSAY OF VANCOMYCIN: CPT

## 2017-11-17 PROCEDURE — 85610 PROTHROMBIN TIME: CPT

## 2017-11-17 PROCEDURE — 7100000011 HC PHASE II RECOVERY - ADDTL 15 MIN

## 2017-11-17 PROCEDURE — 85730 THROMBOPLASTIN TIME PARTIAL: CPT

## 2017-11-17 PROCEDURE — 85049 AUTOMATED PLATELET COUNT: CPT

## 2017-11-17 PROCEDURE — 32555 ASPIRATE PLEURA W/ IMAGING: CPT

## 2017-11-17 RX ORDER — ACETAMINOPHEN 325 MG/1
650 TABLET ORAL EVERY 4 HOURS PRN
Status: DISCONTINUED | OUTPATIENT
Start: 2017-11-17 | End: 2017-11-20 | Stop reason: HOSPADM

## 2017-11-17 RX ORDER — SODIUM CHLORIDE 9 MG/ML
INJECTION, SOLUTION INTRAVENOUS CONTINUOUS
Status: DISCONTINUED | OUTPATIENT
Start: 2017-11-17 | End: 2017-11-20 | Stop reason: HOSPADM

## 2017-11-17 RX ADMIN — SODIUM CHLORIDE: 9 INJECTION, SOLUTION INTRAVENOUS at 09:30

## 2017-11-17 ASSESSMENT — PAIN SCALES - GENERAL: PAINLEVEL_OUTOF10: 8

## 2017-11-17 ASSESSMENT — PAIN - FUNCTIONAL ASSESSMENT: PAIN_FUNCTIONAL_ASSESSMENT: 0-10

## 2017-11-17 ASSESSMENT — PAIN DESCRIPTION - PAIN TYPE: TYPE: CHRONIC PAIN

## 2017-11-17 ASSESSMENT — PAIN DESCRIPTION - DESCRIPTORS: DESCRIPTORS: BURNING;CONSTANT

## 2017-11-17 NOTE — H&P
Currently     Other Topics Concern    Not on file     Social History Narrative    No narrative on file        Service:Yes, [email or web address]         Hobbies: races pigeons 600 mile distance    OBJECTIVE:   VITALS:  height is 6' 1\" (1.854 m) and weight is 235 lb (106.6 kg). His oral temperature is 97.8 °F (36.6 °C). His blood pressure is 136/76 and his pulse is 105. His respiration is 16 and oxygen saturation is 96%. CONSTITUTIONAL: Alert and oriented times 3, no acute distress and cooperative to examination. friendly and pleasant     SKIN: rash No    HEENT: Head is normocephalic, atraumatic. EOMI, PERRLA, on nasal oxygen     Oral air way :slightly narrow Yes    NECK: neck supple, no lymphadenopathy noted, trachea midline and straight       2+ carotid, no bruit    LUNGS: Chest expands equally bilaterally upon respiration, no accessory muscle used. Ausculation reveals no adventitious breath sounds but distance sounds . CARDIOVASCULAR: \"Heart sounds are normal.  Regular rate and rhythm without murmur,    ABDOMEN: Bowel sounds are present in all four quadrants      GENATALIA:Deferred. NEUROLOGIC: \"CN II-XII are grossly intact.        EXTREMITIES: Pitting edema:  No,  Varicose veins: No     Dorsal pedal/posterior tibial pulses palpable: Yes         Strength:  Normal       Patient Active Problem List   Diagnosis    AAA (abdominal aortic aneurysm) without rupture (HCC)    Primary osteoarthritis of left knee    Knee effusion, left    Staphylococcal septicemia ([address]    Lumbar and sacral osteoarthritis    Lumbar stenosis with neurogenic claudication    Degenerative spondylolisthesis    DDD (degenerative disc disease), thoracolumbar    COPD (chronic obstructive pulmonary disease) (HCC)    UTI (urinary tract infection)    Elevated PSA    Staphylococcal arthritis of left knee (HCC)    Atrial fibrillation (HCC)    Acute cystitis without hematuria    Thrombocytopenia (HCC)    Severe malnutrition ([address]

## 2017-11-17 NOTE — BRIEF OP NOTE
Brief Postoperative Note    Javad Lipscomb  YOB: 1940  [id number]    Pre-operative Diagnosis: pleural effusion    Post-operative Diagnosis: Same    Procedure: US guided right thoracenthesis    Anesthesia: Local    Surgeons/Assistants: Zuri Hawkins     Estimated Blood Loss: less than 50     Complications: None    Specimens: Was Not Obtained    Findings: 1800 ml of yecenia color fluid drain from right pleural space,    Electronically signed by Zuri Hawkins MD on 11/17/2017 at 10:44 AM

## 2017-11-19 ENCOUNTER — HOSPITAL ENCOUNTER (OUTPATIENT)
Age: 77
Setting detail: SPECIMEN
Discharge: HOME | End: 2017-11-19
Payer: MEDICARE

## 2017-11-19 LAB
-: NORMAL
AMORPHOUS: NORMAL
BACTERIA: NORMAL
BILIRUBIN URINE: NEGATIVE
CASTS UA: NORMAL /LPF (ref 0–8)
COLOR: YELLOW
COMMENT UA: ABNORMAL
CRYSTALS, UA: NORMAL /HPF
EPITHELIAL CELLS UA: NORMAL /HPF (ref 0–5)
GLUCOSE URINE: NEGATIVE
KETONES, URINE: NEGATIVE
LEUKOCYTE ESTERASE, URINE: ABNORMAL
MUCUS: NORMAL
NITRITE, URINE: NEGATIVE
OTHER OBSERVATIONS UA: NORMAL
PH UA: 6 (ref 5–8)
PROTEIN UA: NEGATIVE
RBC UA: NORMAL /HPF (ref 0–4)
RENAL EPITHELIAL, UA: NORMAL /HPF
SPECIFIC GRAVITY UA: 1.02 (ref 1–1.03)
TRICHOMONAS: NORMAL
TURBIDITY: ABNORMAL
URINE HGB: ABNORMAL
UROBILINOGEN, URINE: NORMAL
WBC UA: NORMAL /HPF (ref 0–5)
YEAST: NORMAL

## 2017-11-19 PROCEDURE — 87086 URINE CULTURE/COLONY COUNT: CPT

## 2017-11-19 PROCEDURE — 81001 URINALYSIS AUTO W/SCOPE: CPT

## 2017-11-19 NOTE — DISCHARGE SUMMARY
Physician Discharge Summary     Patient ID:  Aaliyah Paul  [id number]  76 y.o.  1940    Admit date: 11/7/2017    Discharge date and time:  11/15/2017    Admission Diagnoses:   Patient Active Problem List   Diagnosis    AAA (abdominal aortic aneurysm) without rupture (HCC)    Primary osteoarthritis of left knee    Knee effusion, left    Staphylococcal septicemia ([address]    Lumbar and sacral osteoarthritis    Lumbar stenosis with neurogenic claudication    Degenerative spondylolisthesis    DDD (degenerative disc disease), thoracolumbar    COPD (chronic obstructive pulmonary disease) (HCC)    UTI (urinary tract infection)    Elevated PSA    Staphylococcal arthritis of left knee (HCC)    Atrial fibrillation (HCC)    Acute cystitis without hematuria    Thrombocytopenia (HCC)    Severe malnutrition ([facility])    Prediabetes    Acute osteomyelitis of thoracic spine ([facility])    Thoracic discitis    Osteomyelitis of thoracic region ([address]    Decubitus ulcer of left buttock, stage 3 ([facility])    Iron deficiency anemia    Class 2 obesity due to excess calories with serious comorbidity in adult    Iron deficiency anemia due to chronic blood loss    Decubitus ulcer of sacral region, stage 3 ([address]    Osteomyelitis ([facility])       Discharge Diagnoses:Spinal osteomyelitis with MSSA infection  Severe malnutrition  Iron deficiency anemia  Paroxysmal atrial fibrillation  Sacral decubiti stage III  Abdominal aortic aneurysm  COPD  Obesity BMI 32.14   Consults: cardiology, pulmonary/intensive care, ID, GI and general surgery orthopedic surgeon    Procedures: Debridement of the sacral decubitus and biopsy of the bone and [facility]    Hospital Course: 47-year-old white gentleman admitted to severe mid back pain continuous times few days was just recently abdominal 6 weeks of IV antibiotics for MSSA septicemia cultures IV antibiotics were started patient .   Pain control physical therapy were ordered patient had biopsy which was spasms Historical Med      oxyCODONE HCl (OXY-IR) 10 MG immediate release tablet Take 10 mg by mouth every 6 hours as needed for Pain  . Historical Med      loratadine (CLARITIN) 10 MG capsule Take 10 mg by mouth dailyHistorical Med      albuterol (PROVENTIL) (2.5 MG/3ML) 0.083% nebulizer solution Take 3 mLs by nebulization 4 times daily, Disp-120 each, R-3Normal      tiotropium (SPIRIVA RESPIMAT) 2.5 MCG/ACT AERS inhaler Inhale 2 puffs into the lungs daily, Disp-1 Inhaler, R-0DC to SNF      apixaban (ELIQUIS) 5 MG TABS tablet Take 1 tablet by mouth 2 times daily, Disp-60 tablet, R-1DC to SNF      docusate sodium (COLACE, DULCOLAX) 100 MG CAPS Take 100 mg by mouth 2 times daily, Disp-60 capsule, R-0DC to SNF      atorvastatin (LIPITOR) 20 MG tablet Take 20 mg by mouth dailyHistorical Med      doxazosin (CARDURA) 4 MG tablet Take 4 mg by mouth nightlyHistorical Med      magnesium oxide (MAG-OX) 400 MG tablet Take 400 mg by mouth dailyHistorical Med      vitamin B-12 (CYANOCOBALAMIN) 500 MCG tablet Take 500 mcg by mouth daily Historical Med         STOP taking these medications       levofloxacin (LEVAQUIN) 500 MG tablet Comments:   Reason for Stopping:         omeprazole (PRILOSEC OTC) 20 MG tablet Comments:   Reason for Stopping:         guaiFENesin 400 MG tablet Comments:   Reason for Stopping:         aspirin 81 MG tablet Comments:   Reason for Stopping:             Activity: Up with assistance  Diet: regular diet    Follow-up with pcp in 2 weeks. Follow up with infectious disease in 8 weeks patient was transferred on IV vancomycin and ID will be following and [facility] treatment might be extended more than 6 weeks more than 35 minutes were spent on discharge  Signed:   Giovani Robert MD  11/19/2017  4:59 PM

## 2017-11-20 ENCOUNTER — HOSPITAL ENCOUNTER (OUTPATIENT)
Age: 77
Setting detail: SPECIMEN
Discharge: HOME | End: 2017-11-20
Payer: MEDICARE

## 2017-11-20 LAB
CULTURE: NO GROWTH
CULTURE: NORMAL
Lab: NORMAL
SPECIMEN DESCRIPTION: NORMAL
SPECIMEN DESCRIPTION: NORMAL
STATUS: NORMAL
VANCOMYCIN TROUGH DATE LAST DOSE: ABNORMAL
VANCOMYCIN TROUGH DOSE AMOUNT: ABNORMAL
VANCOMYCIN TROUGH TIME LAST DOSE: ABNORMAL
VANCOMYCIN TROUGH: 22.5 UG/ML (ref 10–20)

## 2017-11-20 PROCEDURE — 80202 ASSAY OF VANCOMYCIN: CPT

## 2017-11-26 ENCOUNTER — HOSPITAL ENCOUNTER (OUTPATIENT)
Age: 77
Setting detail: SPECIMEN
Discharge: HOME | End: 2017-11-26
Payer: MEDICARE

## 2017-11-26 LAB
SEDIMENTATION RATE, ERYTHROCYTE: 110 MM (ref 0–15)
URIC ACID: 2.1 MG/DL (ref 3.4–7)

## 2017-11-26 PROCEDURE — 85651 RBC SED RATE NONAUTOMATED: CPT

## 2017-11-26 PROCEDURE — 84550 ASSAY OF BLOOD/URIC ACID: CPT

## 2017-11-28 ENCOUNTER — HOSPITAL ENCOUNTER (OUTPATIENT)
Age: 77
Setting detail: SPECIMEN
Discharge: HOME | End: 2017-11-28
Payer: MEDICARE

## 2017-11-28 LAB
VANCOMYCIN TROUGH DATE LAST DOSE: ABNORMAL
VANCOMYCIN TROUGH DOSE AMOUNT: ABNORMAL
VANCOMYCIN TROUGH TIME LAST DOSE: ABNORMAL
VANCOMYCIN TROUGH: 21.8 UG/ML (ref 10–20)

## 2017-11-28 PROCEDURE — 80202 ASSAY OF VANCOMYCIN: CPT

## 2017-11-30 ENCOUNTER — HOSPITAL ENCOUNTER (OUTPATIENT)
Age: 77
Setting detail: SPECIMEN
Discharge: HOME | End: 2017-11-30
Payer: MEDICARE

## 2017-11-30 LAB — SEDIMENTATION RATE, ERYTHROCYTE: 75 MM (ref 0–15)

## 2017-11-30 PROCEDURE — 85651 RBC SED RATE NONAUTOMATED: CPT

## 2017-12-03 ENCOUNTER — HOSPITAL ENCOUNTER (OUTPATIENT)
Age: 77
Setting detail: SPECIMEN
Discharge: HOME | End: 2017-12-03
Payer: MEDICARE

## 2017-12-03 LAB
ALBUMIN SERPL-MCNC: 2.2 G/DL (ref 3.5–5.2)
ALBUMIN/GLOBULIN RATIO: ABNORMAL (ref 1–2.5)
ALP BLD-CCNC: 84 U/L (ref 40–129)
ALT SERPL-CCNC: 9 U/L (ref 5–41)
ANION GAP SERPL CALCULATED.3IONS-SCNC: 12 MMOL/L (ref 9–17)
AST SERPL-CCNC: 10 U/L
BILIRUB SERPL-MCNC: 0.31 MG/DL (ref 0.3–1.2)
BUN BLDV-MCNC: 12 MG/DL (ref 8–23)
BUN/CREAT BLD: 26 (ref 9–20)
CALCIUM SERPL-MCNC: 8.1 MG/DL (ref 8.6–10.4)
CHLORIDE BLD-SCNC: 101 MMOL/L (ref 98–107)
CO2: 25 MMOL/L (ref 20–31)
CREAT SERPL-MCNC: 0.47 MG/DL (ref 0.7–1.2)
GFR AFRICAN AMERICAN: >60 ML/MIN
GFR NON-AFRICAN AMERICAN: >60 ML/MIN
GFR SERPL CREATININE-BSD FRML MDRD: ABNORMAL ML/MIN/{1.73_M2}
GFR SERPL CREATININE-BSD FRML MDRD: ABNORMAL ML/MIN/{1.73_M2}
GLUCOSE BLD-MCNC: 99 MG/DL (ref 70–99)
HCT VFR BLD CALC: 26.5 % (ref 41–53)
HEMOGLOBIN: 8.4 G/DL (ref 13.5–17.5)
INR BLD: 2.3
MCH RBC QN AUTO: 24.6 PG (ref 26–34)
MCHC RBC AUTO-ENTMCNC: 31.7 G/DL (ref 31–37)
MCV RBC AUTO: 77.8 FL (ref 80–100)
PDW BLD-RTO: 18 % (ref 11.5–14.5)
PLATELET # BLD: 244 K/UL (ref 130–400)
PMV BLD AUTO: 8.4 FL (ref 6–12)
POTASSIUM SERPL-SCNC: 3.5 MMOL/L (ref 3.7–5.3)
PROTHROMBIN TIME: 24.3 SEC (ref 9.7–11.6)
RBC # BLD: 3.4 M/UL (ref 4.5–5.9)
SEDIMENTATION RATE, ERYTHROCYTE: 86 MM (ref 0–15)
SODIUM BLD-SCNC: 138 MMOL/L (ref 135–144)
TOTAL PROTEIN: 6.4 G/DL (ref 6.4–8.3)
WBC # BLD: 6.3 K/UL (ref 3.5–11)

## 2017-12-03 PROCEDURE — 85610 PROTHROMBIN TIME: CPT

## 2017-12-03 PROCEDURE — 85027 COMPLETE CBC AUTOMATED: CPT

## 2017-12-03 PROCEDURE — 85651 RBC SED RATE NONAUTOMATED: CPT

## 2017-12-03 PROCEDURE — 80053 COMPREHEN METABOLIC PANEL: CPT

## 2017-12-05 ENCOUNTER — HOSPITAL ENCOUNTER (OUTPATIENT)
Age: 77
Setting detail: SPECIMEN
Discharge: HOME | End: 2017-12-05
Payer: MEDICARE

## 2017-12-05 PROCEDURE — 80202 ASSAY OF VANCOMYCIN: CPT

## 2017-12-06 LAB
VANCOMYCIN TROUGH DATE LAST DOSE: NORMAL
VANCOMYCIN TROUGH DOSE AMOUNT: NORMAL
VANCOMYCIN TROUGH TIME LAST DOSE: NORMAL
VANCOMYCIN TROUGH: 11.6 UG/ML (ref 10–20)

## 2017-12-07 ENCOUNTER — HOSPITAL ENCOUNTER (OUTPATIENT)
Age: 77
Setting detail: SPECIMEN
Discharge: HOME | End: 2017-12-07
Payer: MEDICARE

## 2017-12-07 LAB
VANCOMYCIN TROUGH DATE LAST DOSE: NORMAL
VANCOMYCIN TROUGH DOSE AMOUNT: NORMAL
VANCOMYCIN TROUGH TIME LAST DOSE: NORMAL
VANCOMYCIN TROUGH: 13.4 UG/ML (ref 10–20)

## 2017-12-07 PROCEDURE — 80202 ASSAY OF VANCOMYCIN: CPT

## 2017-12-11 LAB
CULTURE: NORMAL
CULTURE: NORMAL
Lab: NORMAL
SPECIMEN DESCRIPTION: NORMAL
SPECIMEN DESCRIPTION: NORMAL
STATUS: NORMAL

## 2017-12-12 ENCOUNTER — HOSPITAL ENCOUNTER (OUTPATIENT)
Age: 77
Setting detail: SPECIMEN
Discharge: HOME | End: 2017-12-12
Payer: MEDICARE

## 2017-12-12 LAB
VANCOMYCIN TROUGH DATE LAST DOSE: NORMAL
VANCOMYCIN TROUGH DOSE AMOUNT: NORMAL
VANCOMYCIN TROUGH TIME LAST DOSE: NORMAL
VANCOMYCIN TROUGH: 20 UG/ML (ref 10–20)

## 2017-12-12 PROCEDURE — 80202 ASSAY OF VANCOMYCIN: CPT

## 2017-12-15 ENCOUNTER — HOSPITAL ENCOUNTER (OUTPATIENT)
Age: 77
Setting detail: SPECIMEN
Discharge: HOME | End: 2017-12-15
Payer: MEDICARE

## 2017-12-15 LAB — SEDIMENTATION RATE, ERYTHROCYTE: 85 MM (ref 0–15)

## 2017-12-15 PROCEDURE — 85651 RBC SED RATE NONAUTOMATED: CPT

## 2017-12-17 ENCOUNTER — HOSPITAL ENCOUNTER (OUTPATIENT)
Age: 77
Setting detail: SPECIMEN
Discharge: HOME | End: 2017-12-17
Payer: MEDICARE

## 2017-12-17 LAB
INR BLD: 1.4
PROTHROMBIN TIME: 14.3 SEC (ref 9.7–11.6)

## 2017-12-17 PROCEDURE — 85610 PROTHROMBIN TIME: CPT

## 2018-01-22 ENCOUNTER — OFFICE VISIT (OUTPATIENT)
Dept: INFECTIOUS DISEASES | Age: 78
End: 2018-01-22
Payer: MEDICARE

## 2018-01-22 VITALS
OXYGEN SATURATION: 87 % | HEIGHT: 73 IN | DIASTOLIC BLOOD PRESSURE: 62 MMHG | TEMPERATURE: 97.6 F | BODY MASS INDEX: 28.63 KG/M2 | WEIGHT: 216 LBS | SYSTOLIC BLOOD PRESSURE: 108 MMHG | RESPIRATION RATE: 14 BRPM | HEART RATE: 82 BPM

## 2018-01-22 DIAGNOSIS — M46.24 ACUTE OSTEOMYELITIS OF THORACIC SPINE (HCC): Primary | ICD-10-CM

## 2018-01-22 DIAGNOSIS — A41.2: ICD-10-CM

## 2018-01-22 DIAGNOSIS — G89.29 CHRONIC MIDLINE LOW BACK PAIN WITHOUT SCIATICA: ICD-10-CM

## 2018-01-22 DIAGNOSIS — M54.50 CHRONIC MIDLINE LOW BACK PAIN WITHOUT SCIATICA: ICD-10-CM

## 2018-01-22 PROCEDURE — G8484 FLU IMMUNIZE NO ADMIN: HCPCS | Performed by: INTERNAL MEDICINE

## 2018-01-22 PROCEDURE — 4040F PNEUMOC VAC/ADMIN/RCVD: CPT | Performed by: INTERNAL MEDICINE

## 2018-01-22 PROCEDURE — 1036F TOBACCO NON-USER: CPT | Performed by: INTERNAL MEDICINE

## 2018-01-22 PROCEDURE — 1123F ACP DISCUSS/DSCN MKR DOCD: CPT | Performed by: INTERNAL MEDICINE

## 2018-01-22 PROCEDURE — 99214 OFFICE O/P EST MOD 30 MIN: CPT | Performed by: INTERNAL MEDICINE

## 2018-01-22 PROCEDURE — G8427 DOCREV CUR MEDS BY ELIG CLIN: HCPCS | Performed by: INTERNAL MEDICINE

## 2018-01-22 PROCEDURE — G8419 CALC BMI OUT NRM PARAM NOF/U: HCPCS | Performed by: INTERNAL MEDICINE

## 2018-01-22 RX ORDER — MINERAL OIL 100 G/100G
1 OIL RECTAL ONCE
COMMUNITY
End: 2018-02-07 | Stop reason: ALTCHOICE

## 2018-01-22 RX ORDER — BISACODYL 10 MG
10 SUPPOSITORY, RECTAL RECTAL DAILY
COMMUNITY
End: 2018-02-07 | Stop reason: ALTCHOICE

## 2018-01-22 RX ORDER — POLYETHYLENE GLYCOL 1450
POWDER (GRAM) MISCELLANEOUS
COMMUNITY
End: 2018-02-07 | Stop reason: ALTCHOICE

## 2018-01-22 RX ORDER — THERMOMETER, ELECTRONIC,ORAL
EACH MISCELLANEOUS 2 TIMES DAILY
COMMUNITY
End: 2018-02-07 | Stop reason: HOSPADM

## 2018-01-22 RX ORDER — CLOTRIMAZOLE AND BETAMETHASONE DIPROPIONATE 10; .64 MG/G; MG/G
CREAM TOPICAL 2 TIMES DAILY
COMMUNITY
End: 2018-04-30 | Stop reason: ALTCHOICE

## 2018-01-22 RX ORDER — YOHIMBE BARK 500 MG
CAPSULE ORAL
COMMUNITY
End: 2018-02-07 | Stop reason: ALTCHOICE

## 2018-01-22 RX ORDER — RIFAMPIN 150 MG/1
CAPSULE ORAL DAILY
COMMUNITY
End: 2018-02-07 | Stop reason: ALTCHOICE

## 2018-01-22 RX ORDER — DIPHENHYDRAMINE HCL 25 MG
25 TABLET ORAL EVERY 6 HOURS PRN
COMMUNITY
End: 2018-04-30 | Stop reason: ALTCHOICE

## 2018-01-22 RX ORDER — AMOXICILLIN 250 MG
1 CAPSULE ORAL DAILY
COMMUNITY
End: 2018-02-07 | Stop reason: ALTCHOICE

## 2018-01-22 RX ORDER — TERAZOSIN 5 MG/1
5 CAPSULE ORAL NIGHTLY
COMMUNITY
End: 2018-08-27 | Stop reason: ALTCHOICE

## 2018-01-22 RX ORDER — GABAPENTIN 100 MG/1
100 CAPSULE ORAL 2 TIMES DAILY
COMMUNITY
End: 2018-08-27 | Stop reason: SDUPTHER

## 2018-01-22 RX ORDER — HYDROXYZINE HYDROCHLORIDE 25 MG/1
25 TABLET, FILM COATED ORAL 3 TIMES DAILY PRN
COMMUNITY
End: 2018-02-07 | Stop reason: ALTCHOICE

## 2018-01-22 RX ORDER — PROMETHAZINE HYDROCHLORIDE 25 MG/ML
6.25 INJECTION, SOLUTION INTRAMUSCULAR; INTRAVENOUS EVERY 6 HOURS PRN
COMMUNITY
End: 2018-02-07 | Stop reason: ALTCHOICE

## 2018-01-22 RX ORDER — DIAPER,BRIEF,INFANT-TODD,DISP
EACH MISCELLANEOUS 2 TIMES DAILY
COMMUNITY
End: 2018-04-30 | Stop reason: ALTCHOICE

## 2018-01-22 RX ORDER — SULFAMETHOXAZOLE AND TRIMETHOPRIM 800; 160 MG/1; MG/1
1 TABLET ORAL 2 TIMES DAILY
Qty: 60 TABLET | Refills: 1 | Status: SHIPPED | OUTPATIENT
Start: 2018-01-22 | End: 2018-02-07 | Stop reason: ALTCHOICE

## 2018-01-22 RX ORDER — FLUCONAZOLE 150 MG/1
150 TABLET ORAL ONCE
COMMUNITY
End: 2018-02-07 | Stop reason: ALTCHOICE

## 2018-01-22 RX ORDER — WARFARIN SODIUM 6 MG/1
6 TABLET ORAL
COMMUNITY
End: 2018-04-30 | Stop reason: ALTCHOICE

## 2018-01-22 RX ORDER — FENTANYL 100 UG/H
1 PATCH TRANSDERMAL
COMMUNITY
End: 2018-04-30 | Stop reason: ALTCHOICE

## 2018-01-22 ASSESSMENT — ENCOUNTER SYMPTOMS
ALLERGIC/IMMUNOLOGIC NEGATIVE: 1
GASTROINTESTINAL NEGATIVE: 1
BACK PAIN: 1
EYES NEGATIVE: 1
RESPIRATORY NEGATIVE: 1

## 2018-01-22 NOTE — PROGRESS NOTES
Medications    sulfamethoxazole-trimethoprim (BACTRIM DS) 800-160 MG per tablet     Sig: Take 1 tablet by mouth 2 times daily     Dispense:  60 tablet     Refill:  1       Chief complaint/reason for consultation:     Chief Complaint   Patient presents with    Osteomyelitis      spine        History of Present Illness:   Kalpana Merrill is a 76y.o.-year-old male who was seen at [address] [place] [address] with Staphylococcus aureus sepsis, MSSA UTI, left knee septic arthritis for which he underwent surgical drainage. The patient received 6 weeks of vancomycin through October 31 and ended up presenting with worsening back pain that started 2 days after completing her IV antimicrobial therapy. The patient had a CAT scan that showed multilevel discitis and osteomyelitis and underwent IR guided biopsy in November 10 which showed MSSA on culture. The patient is here for T4-5, T9-10, T12-L1 discitis and osteomyelitis. The patient also had a stage III left gluteal ulcer which had debridement done. He ended up going to [facility] and was discharged on intravenous vancomycin and rifampin. The plan was to complete a 12 week course of antimicrobial therapy through January 30. The patient is now at the [facility] for about a month now and reports that he is doing well with physical therapy. His back pain has overall improved. He is able to get up and walk around his pain is much better though he still does have low back pain. He was seen by Dr. Susana Evangelista to evaluate the back pain as well as discitis. This some consideration for a steroid injection in the lumbosacral spine. The patient is here with his daughter. I have personally reviewed the past medical history, medications, social history, and I have updated the database accordingly.   Past Medical History:     Past Medical History:   Diagnosis Date    [facility]     Arthritis     Left knee     Chronic back pain     and knee pain/ sees

## 2018-01-24 ENCOUNTER — TELEPHONE (OUTPATIENT)
Dept: INFECTIOUS DISEASES | Age: 78
End: 2018-01-24

## 2018-01-24 NOTE — TELEPHONE ENCOUNTER
Received a call back from [place] at [facility] wanting to know how long the patient is to be on bactrim. I read her Dr. Moreno Dugan dictation from the patient's visit which informed her of his medications.

## 2018-01-26 ENCOUNTER — TELEPHONE (OUTPATIENT)
Dept: INFECTIOUS DISEASES | Age: 78
End: 2018-01-26

## 2018-01-26 NOTE — TELEPHONE ENCOUNTER
Copy of Perfect SErve Message:     Rafael Canchola MD   0\"   1/26/2018 10:16 AM   Brittney Kellogg. Pt is on IV antibiotics until 1/31. Can [facility] pull the PICC line? [place] Screen  Read 1/26/2018 10:32 AM   0\"   1/26/2018 10:32 AM   Yes  0\"   1/26/2018 10:39 AM   Thanks! Unread       11:04Pam Merchant at [facility] informed.  Jackelyn Montemayor

## 2018-02-07 ENCOUNTER — OFFICE VISIT (OUTPATIENT)
Dept: INFECTIOUS DISEASES | Age: 78
End: 2018-02-07
Payer: MEDICARE

## 2018-02-07 VITALS
SYSTOLIC BLOOD PRESSURE: 119 MMHG | TEMPERATURE: 97.5 F | DIASTOLIC BLOOD PRESSURE: 71 MMHG | RESPIRATION RATE: 14 BRPM | WEIGHT: 200 LBS | OXYGEN SATURATION: 93 % | HEIGHT: 73 IN | HEART RATE: 86 BPM | BODY MASS INDEX: 26.51 KG/M2

## 2018-02-07 DIAGNOSIS — T37.95XA ALLERGIC DRUG RASH DUE TO ANTI-INFECTIVE AGENT: ICD-10-CM

## 2018-02-07 DIAGNOSIS — L89.323 DECUBITUS ULCER OF LEFT BUTTOCK, STAGE 3 (HCC): ICD-10-CM

## 2018-02-07 DIAGNOSIS — M46.24 ACUTE OSTEOMYELITIS OF THORACIC SPINE (HCC): Primary | ICD-10-CM

## 2018-02-07 DIAGNOSIS — L27.0 ALLERGIC DRUG RASH DUE TO ANTI-INFECTIVE AGENT: ICD-10-CM

## 2018-02-07 PROCEDURE — 1036F TOBACCO NON-USER: CPT | Performed by: INTERNAL MEDICINE

## 2018-02-07 PROCEDURE — 99214 OFFICE O/P EST MOD 30 MIN: CPT | Performed by: INTERNAL MEDICINE

## 2018-02-07 PROCEDURE — G8484 FLU IMMUNIZE NO ADMIN: HCPCS | Performed by: INTERNAL MEDICINE

## 2018-02-07 PROCEDURE — G8419 CALC BMI OUT NRM PARAM NOF/U: HCPCS | Performed by: INTERNAL MEDICINE

## 2018-02-07 PROCEDURE — 1123F ACP DISCUSS/DSCN MKR DOCD: CPT | Performed by: INTERNAL MEDICINE

## 2018-02-07 PROCEDURE — 4040F PNEUMOC VAC/ADMIN/RCVD: CPT | Performed by: INTERNAL MEDICINE

## 2018-02-07 PROCEDURE — G8427 DOCREV CUR MEDS BY ELIG CLIN: HCPCS | Performed by: INTERNAL MEDICINE

## 2018-02-07 RX ORDER — DOXYCYCLINE HYCLATE 100 MG
100 TABLET ORAL 2 TIMES DAILY
Qty: 60 TABLET | Refills: 3 | Status: SHIPPED | OUTPATIENT
Start: 2018-02-07 | End: 2018-03-09

## 2018-02-07 RX ORDER — ONDANSETRON 4 MG/1
TABLET, FILM COATED ORAL
COMMUNITY
Start: 2018-01-29 | End: 2018-04-30 | Stop reason: ALTCHOICE

## 2018-02-07 ASSESSMENT — ENCOUNTER SYMPTOMS
ALLERGIC/IMMUNOLOGIC NEGATIVE: 1
GASTROINTESTINAL NEGATIVE: 1
RESPIRATORY NEGATIVE: 1
EYES NEGATIVE: 1

## 2018-02-07 NOTE — PROGRESS NOTES
daughter lives here with him reports that there was a large one over his left knee and she was concerned the knee itself was infected. They decided to call in for sick visits to be evaluated and also to assess for different oral suppressive antibiotic agent. The patient is otherwise doing well denies any fevers or chills no cough or sugars of breath or chest pains or palpitations. He does have some mild pruritus associated with the welts that he had medical welts themselves have resolved. He does not have any back pain to speak of. He's been able to ambulate pretty well though he does use a walker as an assistive device. I have personally reviewed the past medical history, medications, social history, and I have updated the database accordingly. Past Medical History:     Past Medical History:   Diagnosis Date    [facility]     Arthritis     Left knee     Chronic back pain     and knee pain/ sees chiropractor    [facility]) [facility]     Hyperlipidemia     Lumbar stenosis     L4-L5    MSSA (methicillin susceptible Staphylococcus aureus)     Osteomyelitis ([facility]     spine    Prostate enlargement     Sleep apnea     wears c-pap    Thoracic back pain     T4-T5      Social History:     Social History     Social History    Marital status:      Spouse name: N/A    Number of children: N/A    Years of education: N/A     Occupational History    Not on file.      Social History Main Topics    Smoking status: Former Smoker    Smokeless tobacco: Never Used    Alcohol use No      Comment: stopped due to Rx    Drug use: No    Sexual activity: Not Currently     Other Topics Concern    Not on file     Social History Narrative    No narrative on file     Medications:     Current Outpatient Prescriptions   Medication Sig Dispense Refill    ondansetron (ZOFRAN) 4 MG tablet       rifampin (RIFADIN) 300 MG capsule       diphenhydrAMINE (BENADRYL) 25 MG tablet Take 25 mg regular rhythm. No murmur heard. Pulmonary/Chest: Effort normal and breath sounds normal. He has no wheezes. Abdominal: Soft. Bowel sounds are normal. There is no tenderness. Musculoskeletal: Normal range of motion. He exhibits no edema. Neurological: He is alert and oriented to person, place, and time. Skin: Skin is warm and dry. Rash noted. There is a left gluteal was pressure related sacral wound which does tunnel at the 2o'clock area. Psychiatric: Affect and judgment normal.       Laboratory studies :  Medical Decision Making:   I have independently reviewed the following labs:  CBC with Differential:   Lab Results   Component Value Date    WBC 6.3 12/03/2017    WBC 9.7 11/14/2017    HGB 8.4 12/03/2017    HGB 9.5 11/14/2017    HCT 26.5 12/03/2017    HCT 28.9 11/14/2017     12/03/2017     11/17/2017    LYMPHOPCT 9 11/14/2017    LYMPHOPCT 9 11/12/2017    MONOPCT 9 11/14/2017    MONOPCT 9 11/12/2017       BMP:   Lab Results   Component Value Date     12/03/2017     11/14/2017    K 3.5 12/03/2017    K 3.9 11/14/2017     12/03/2017    CL 95 11/14/2017    CO2 25 12/03/2017    CO2 27 11/14/2017    BUN 12 12/03/2017    BUN 9 11/15/2017    CREATININE 0.47 12/03/2017    CREATININE 0.56 11/15/2017    MG 2.0 09/19/2017       Hepatic Function Panel:   Lab Results   Component Value Date    PROT 6.4 12/03/2017    PROT 6.3 11/12/2017    LABALBU 2.2 12/03/2017    LABALBU 2.3 11/12/2017    BILIDIR 0.11 11/07/2017    BILIDIR 0.31 09/26/2017    IBILI 0.38 11/07/2017    IBILI 1.04 09/26/2017    BILITOT 0.31 12/03/2017    BILITOT 0.50 11/12/2017    ALKPHOS 84 12/03/2017    ALKPHOS 74 11/12/2017    ALT 9 12/03/2017    ALT 24 11/12/2017    AST 10 12/03/2017    AST 20 11/12/2017       Lab Results   Component Value Date    CRP 63.5 (H) 11/12/2017     Lab Results   Component Value Date    SEDRATE 80 (H) 12/15/2017       Thank you for allowing us to participate in the care of this patient.

## 2018-02-09 ENCOUNTER — HOSPITAL ENCOUNTER (OUTPATIENT)
Dept: PAIN MANAGEMENT | Age: 78
Discharge: HOME | End: 2018-02-09

## 2018-04-12 PROBLEM — N39.0 UTI (URINARY TRACT INFECTION): Status: RESOLVED | Noted: 2017-09-21 | Resolved: 2018-04-12

## 2018-04-30 ENCOUNTER — OFFICE VISIT (OUTPATIENT)
Dept: INFECTIOUS DISEASES | Age: 78
End: 2018-04-30
Payer: MEDICARE

## 2018-04-30 VITALS
WEIGHT: 221 LBS | BODY MASS INDEX: 29.29 KG/M2 | HEART RATE: 85 BPM | HEIGHT: 73 IN | SYSTOLIC BLOOD PRESSURE: 117 MMHG | TEMPERATURE: 98.1 F | DIASTOLIC BLOOD PRESSURE: 66 MMHG

## 2018-04-30 DIAGNOSIS — L89.323 DECUBITUS ULCER OF LEFT BUTTOCK, STAGE 3 (HCC): ICD-10-CM

## 2018-04-30 DIAGNOSIS — Z87.39 HISTORY OF OSTEOMYELITIS: Primary | ICD-10-CM

## 2018-04-30 PROCEDURE — 99214 OFFICE O/P EST MOD 30 MIN: CPT | Performed by: INTERNAL MEDICINE

## 2018-04-30 PROCEDURE — G8419 CALC BMI OUT NRM PARAM NOF/U: HCPCS | Performed by: INTERNAL MEDICINE

## 2018-04-30 PROCEDURE — 1036F TOBACCO NON-USER: CPT | Performed by: INTERNAL MEDICINE

## 2018-04-30 PROCEDURE — 1123F ACP DISCUSS/DSCN MKR DOCD: CPT | Performed by: INTERNAL MEDICINE

## 2018-04-30 PROCEDURE — 4040F PNEUMOC VAC/ADMIN/RCVD: CPT | Performed by: INTERNAL MEDICINE

## 2018-04-30 PROCEDURE — G8427 DOCREV CUR MEDS BY ELIG CLIN: HCPCS | Performed by: INTERNAL MEDICINE

## 2018-04-30 RX ORDER — DOXYCYCLINE HYCLATE 100 MG
100 TABLET ORAL
COMMUNITY
Start: 2018-02-08 | End: 2018-08-27 | Stop reason: ALTCHOICE

## 2018-04-30 ASSESSMENT — ENCOUNTER SYMPTOMS
RESPIRATORY NEGATIVE: 1
GASTROINTESTINAL NEGATIVE: 1

## 2018-08-24 DIAGNOSIS — Z87.39 HISTORY OF OSTEOMYELITIS: ICD-10-CM

## 2018-08-24 PROBLEM — L89.324 DECUBITUS ULCER OF LEFT BUTTOCK, STAGE 4 (HCC): Status: ACTIVE | Noted: 2018-03-22

## 2018-08-27 ENCOUNTER — OFFICE VISIT (OUTPATIENT)
Dept: INFECTIOUS DISEASES | Age: 78
End: 2018-08-27
Payer: MEDICARE

## 2018-08-27 VITALS
BODY MASS INDEX: 29.69 KG/M2 | TEMPERATURE: 96.9 F | WEIGHT: 224 LBS | HEIGHT: 73 IN | SYSTOLIC BLOOD PRESSURE: 137 MMHG | DIASTOLIC BLOOD PRESSURE: 83 MMHG | HEART RATE: 65 BPM

## 2018-08-27 DIAGNOSIS — G89.29 CHRONIC MIDLINE LOW BACK PAIN WITHOUT SCIATICA: ICD-10-CM

## 2018-08-27 DIAGNOSIS — D69.6 THROMBOCYTOPENIA (HCC): ICD-10-CM

## 2018-08-27 DIAGNOSIS — M54.50 CHRONIC MIDLINE LOW BACK PAIN WITHOUT SCIATICA: ICD-10-CM

## 2018-08-27 DIAGNOSIS — Z87.39 HISTORY OF OSTEOMYELITIS: Primary | ICD-10-CM

## 2018-08-27 PROCEDURE — 1101F PT FALLS ASSESS-DOCD LE1/YR: CPT | Performed by: INTERNAL MEDICINE

## 2018-08-27 PROCEDURE — G8419 CALC BMI OUT NRM PARAM NOF/U: HCPCS | Performed by: INTERNAL MEDICINE

## 2018-08-27 PROCEDURE — 1036F TOBACCO NON-USER: CPT | Performed by: INTERNAL MEDICINE

## 2018-08-27 PROCEDURE — 1123F ACP DISCUSS/DSCN MKR DOCD: CPT | Performed by: INTERNAL MEDICINE

## 2018-08-27 PROCEDURE — G8427 DOCREV CUR MEDS BY ELIG CLIN: HCPCS | Performed by: INTERNAL MEDICINE

## 2018-08-27 PROCEDURE — 4040F PNEUMOC VAC/ADMIN/RCVD: CPT | Performed by: INTERNAL MEDICINE

## 2018-08-27 PROCEDURE — 99214 OFFICE O/P EST MOD 30 MIN: CPT | Performed by: INTERNAL MEDICINE

## 2018-08-27 RX ORDER — GABAPENTIN 100 MG/1
100 CAPSULE ORAL DAILY
Qty: 90 CAPSULE | Refills: 1 | Status: SHIPPED | OUTPATIENT
Start: 2018-08-27 | End: 2021-01-04

## 2018-08-27 ASSESSMENT — ENCOUNTER SYMPTOMS
BACK PAIN: 1
RESPIRATORY NEGATIVE: 1
GASTROINTESTINAL NEGATIVE: 1

## 2018-08-27 NOTE — PROGRESS NOTES
Prescriptions   Medication Sig Dispense Refill    gabapentin (NEURONTIN) 100 MG capsule Take 1 capsule by mouth daily for 30 days. . 90 capsule 1    tamsulosin (FLOMAX) 0.4 MG capsule Take 0.4 mg by mouth daily       No current facility-administered medications for this visit. Allergies:   Latex; Ibuprofen; Penicillins; and Cephalosporins     Review of Systems:   Review of Systems   Constitutional: Negative. Respiratory: Negative. Cardiovascular: Negative. Gastrointestinal: Negative. Genitourinary: Negative. Musculoskeletal: Positive for back pain. Physical Examination :   /83 (Site: Left Arm)   Pulse 65   Temp 96.9 °F (36.1 °C)   Ht 6' 1\" (1.854 m)   Wt 224 lb (101.6 kg)   BMI 29.55 kg/m²     Physical Exam   Constitutional: He is oriented to person, place, and time and well-developed, well-nourished, and in no distress. HENT:   Head: Normocephalic and atraumatic. Neck: Neck supple. Cardiovascular: Normal rate. Pulmonary/Chest: Effort normal and breath sounds normal.   Abdominal: Soft. Bowel sounds are normal.   Musculoskeletal: Normal range of motion. Neurological: He is alert and oriented to person, place, and time. Skin: Skin is warm and dry.        Laboratory studies :  Medical Decision Making:   I have independently reviewed the following labs:    ESR (08/23/2018 8:41 AM)  ESR (08/23/2018 8:41 AM)   Component Value Ref Range   Sed Rate 18 0 - 20 mm/h     CBC without diff (08/23/2018 8:41 AM)  CBC without diff (08/23/2018 8:41 AM)   Component Value Ref Range   White Blood Cells 4.8 4.4 - 12.0 X10E9/L   RBC count 4.66 3.30 - 5.50 X10E12/L   Hemoglobin 14.2 12.6 - 17.4 g/dL   Hematocrit 41.4 36 - 53 %   MCV 89 81 - 101 fL   MCH 30.5 27 - 35 pg   MCHC 34.4 31 - 36 g/dL   RDW 13.8 11.4 - 14.3 %   Platelets 682 (L) 500 - 450 X10E9/L   MPV 9.8 7 - 12 fL     C-reactive protein (08/23/2018 8:41 AM)  C-reactive protein (08/23/2018 8:41 AM)   Component Value Ref Range CRP 0.8 (H) 0.000 - 0.744 mg/dL     Basic metabolic panel (48/35/9433 8:41 AM)  Basic metabolic panel (64/80/5509 8:41 AM)   Component Value Ref Range   Sodium 143 134 - 146 mmol/L   Potassium, Bld 3.7 3.5 - 5.0 mmol/L   Chloride 110 (H) 98 - 109 mmol/L   CO2 25 22 - 32 mmol/L   Anion gap 8  Comment:   ANION GAP CALCULATION DOES NOT  INCLUDE K, NORMAL RANGES  REFLECT THIS CHANGE   4 - 12 mmol/L   BUN, Bld 20 5 - 27 mg/dL   Creatinine 0.86Comment: METHOD TRACEABLE TO IDMS STANDARD 0.6 - 1.3 mg/dL   Glucose 134 (H) 65 - 99 mg/dL   Calcium 9.0 8.5 - 10.5 mg/dL   GFR MDRD Non Af Amer >60 >59 ml/min/1.73sq. m   GFR MDRD Af Amer >60 >59 ml/min/1.73sq. m       Thank you for allowing us to participate in the care of this patient. Please call with questions. Tim Carroll MD  Perfect Serve messaging: [phone]    This note is created with the assistance of a speech recognition program.  While intending to generate a document that actually reflects the content of the visit, the document can still have some errors including those of syntax and sound a like substitutions which may escape proof reading. It such instances, actual meaning can be extrapolated by contextual diversion.

## 2019-02-26 ENCOUNTER — TELEPHONE (OUTPATIENT)
Dept: INFECTIOUS DISEASES | Age: 79
End: 2019-02-26

## 2019-02-28 ENCOUNTER — OFFICE VISIT (OUTPATIENT)
Dept: INFECTIOUS DISEASES | Age: 79
End: 2019-02-28
Payer: MEDICARE

## 2019-02-28 VITALS
WEIGHT: 250 LBS | HEIGHT: 73 IN | SYSTOLIC BLOOD PRESSURE: 134 MMHG | RESPIRATION RATE: 16 BRPM | TEMPERATURE: 96.9 F | DIASTOLIC BLOOD PRESSURE: 86 MMHG | OXYGEN SATURATION: 97 % | BODY MASS INDEX: 33.13 KG/M2 | HEART RATE: 69 BPM

## 2019-02-28 DIAGNOSIS — M54.6 ACUTE RIGHT-SIDED THORACIC BACK PAIN: Primary | ICD-10-CM

## 2019-02-28 DIAGNOSIS — G89.29 CHRONIC MIDLINE LOW BACK PAIN WITHOUT SCIATICA: ICD-10-CM

## 2019-02-28 DIAGNOSIS — M54.50 CHRONIC MIDLINE LOW BACK PAIN WITHOUT SCIATICA: ICD-10-CM

## 2019-02-28 DIAGNOSIS — Z87.39 HISTORY OF OSTEOMYELITIS: ICD-10-CM

## 2019-02-28 DIAGNOSIS — Z86.19 HISTORY OF STAPHYLOCOCCAL INFECTION: ICD-10-CM

## 2019-02-28 PROCEDURE — G8484 FLU IMMUNIZE NO ADMIN: HCPCS | Performed by: INTERNAL MEDICINE

## 2019-02-28 PROCEDURE — G8427 DOCREV CUR MEDS BY ELIG CLIN: HCPCS | Performed by: INTERNAL MEDICINE

## 2019-02-28 PROCEDURE — 1123F ACP DISCUSS/DSCN MKR DOCD: CPT | Performed by: INTERNAL MEDICINE

## 2019-02-28 PROCEDURE — 4040F PNEUMOC VAC/ADMIN/RCVD: CPT | Performed by: INTERNAL MEDICINE

## 2019-02-28 PROCEDURE — 99214 OFFICE O/P EST MOD 30 MIN: CPT | Performed by: INTERNAL MEDICINE

## 2019-02-28 PROCEDURE — G8417 CALC BMI ABV UP PARAM F/U: HCPCS | Performed by: INTERNAL MEDICINE

## 2019-02-28 PROCEDURE — 1101F PT FALLS ASSESS-DOCD LE1/YR: CPT | Performed by: INTERNAL MEDICINE

## 2019-02-28 PROCEDURE — 1036F TOBACCO NON-USER: CPT | Performed by: INTERNAL MEDICINE

## 2019-02-28 RX ORDER — ATORVASTATIN CALCIUM 20 MG/1
20 TABLET, FILM COATED ORAL
COMMUNITY
Start: 2018-02-26 | End: 2021-01-04

## 2019-02-28 ASSESSMENT — ENCOUNTER SYMPTOMS
BACK PAIN: 1
ALLERGIC/IMMUNOLOGIC NEGATIVE: 1
GASTROINTESTINAL NEGATIVE: 1
RESPIRATORY NEGATIVE: 1
COLOR CHANGE: 1

## 2019-03-01 ENCOUNTER — HOSPITAL ENCOUNTER (OUTPATIENT)
Age: 79
Discharge: HOME | End: 2019-03-01
Payer: MEDICARE

## 2019-03-01 ENCOUNTER — HOSPITAL ENCOUNTER (OUTPATIENT)
Dept: GENERAL RADIOLOGY | Age: 79
Discharge: HOME | End: 2019-03-03
Payer: MEDICARE

## 2019-03-01 ENCOUNTER — HOSPITAL ENCOUNTER (OUTPATIENT)
Age: 79
Discharge: HOME | End: 2019-03-03
Payer: MEDICARE

## 2019-03-01 DIAGNOSIS — M54.6 ACUTE RIGHT-SIDED THORACIC BACK PAIN: ICD-10-CM

## 2019-03-01 DIAGNOSIS — Z87.39 HISTORY OF OSTEOMYELITIS: ICD-10-CM

## 2019-03-01 LAB
ANION GAP SERPL CALCULATED.3IONS-SCNC: 11 MMOL/L (ref 9–17)
BUN BLDV-MCNC: 13 MG/DL (ref 8–23)
BUN/CREAT BLD: ABNORMAL (ref 9–20)
C-REACTIVE PROTEIN: 1.7 MG/L (ref 0–5)
CALCIUM SERPL-MCNC: 8.8 MG/DL (ref 8.6–10.4)
CHLORIDE BLD-SCNC: 108 MMOL/L (ref 98–107)
CO2: 22 MMOL/L (ref 20–31)
CREAT SERPL-MCNC: 0.79 MG/DL (ref 0.7–1.2)
GFR AFRICAN AMERICAN: >60 ML/MIN
GFR NON-AFRICAN AMERICAN: >60 ML/MIN
GFR SERPL CREATININE-BSD FRML MDRD: ABNORMAL ML/MIN/{1.73_M2}
GFR SERPL CREATININE-BSD FRML MDRD: ABNORMAL ML/MIN/{1.73_M2}
GLUCOSE BLD-MCNC: 86 MG/DL (ref 70–99)
HCT VFR BLD CALC: 43.6 % (ref 40.7–50.3)
HEMOGLOBIN: 14.9 G/DL (ref 13–17)
MCH RBC QN AUTO: 30.7 PG (ref 25.2–33.5)
MCHC RBC AUTO-ENTMCNC: 34.2 G/DL (ref 28.4–34.8)
MCV RBC AUTO: 89.7 FL (ref 82.6–102.9)
NRBC AUTOMATED: 0 PER 100 WBC
PDW BLD-RTO: 13.2 % (ref 11.8–14.4)
PLATELET # BLD: 123 K/UL (ref 138–453)
PMV BLD AUTO: 12.1 FL (ref 8.1–13.5)
POTASSIUM SERPL-SCNC: 4.1 MMOL/L (ref 3.7–5.3)
RBC # BLD: 4.86 M/UL (ref 4.21–5.77)
SEDIMENTATION RATE, ERYTHROCYTE: 3 MM (ref 0–10)
SODIUM BLD-SCNC: 141 MMOL/L (ref 135–144)
WBC # BLD: 7.2 K/UL (ref 3.5–11.3)

## 2019-03-01 PROCEDURE — 36415 COLL VENOUS BLD VENIPUNCTURE: CPT

## 2019-03-01 PROCEDURE — 85651 RBC SED RATE NONAUTOMATED: CPT

## 2019-03-01 PROCEDURE — 85027 COMPLETE CBC AUTOMATED: CPT

## 2019-03-01 PROCEDURE — 86140 C-REACTIVE PROTEIN: CPT

## 2019-03-01 PROCEDURE — 72072 X-RAY EXAM THORAC SPINE 3VWS: CPT

## 2019-03-01 PROCEDURE — 80048 BASIC METABOLIC PNL TOTAL CA: CPT

## 2019-03-01 PROCEDURE — 72100 X-RAY EXAM L-S SPINE 2/3 VWS: CPT

## 2019-05-30 ENCOUNTER — OFFICE VISIT (OUTPATIENT)
Dept: INFECTIOUS DISEASES | Age: 79
End: 2019-05-30
Payer: MEDICARE

## 2019-05-30 VITALS
HEIGHT: 73 IN | HEART RATE: 69 BPM | WEIGHT: 255 LBS | BODY MASS INDEX: 33.8 KG/M2 | TEMPERATURE: 97.1 F | SYSTOLIC BLOOD PRESSURE: 134 MMHG | DIASTOLIC BLOOD PRESSURE: 78 MMHG

## 2019-05-30 DIAGNOSIS — M54.50 CHRONIC MIDLINE LOW BACK PAIN WITHOUT SCIATICA: Primary | ICD-10-CM

## 2019-05-30 DIAGNOSIS — Z86.19 HISTORY OF STAPHYLOCOCCAL INFECTION: ICD-10-CM

## 2019-05-30 DIAGNOSIS — G89.29 CHRONIC MIDLINE LOW BACK PAIN WITHOUT SCIATICA: Primary | ICD-10-CM

## 2019-05-30 DIAGNOSIS — Z87.39 HISTORY OF OSTEOMYELITIS: ICD-10-CM

## 2019-05-30 PROCEDURE — G8417 CALC BMI ABV UP PARAM F/U: HCPCS | Performed by: INTERNAL MEDICINE

## 2019-05-30 PROCEDURE — 1123F ACP DISCUSS/DSCN MKR DOCD: CPT | Performed by: INTERNAL MEDICINE

## 2019-05-30 PROCEDURE — 99214 OFFICE O/P EST MOD 30 MIN: CPT | Performed by: INTERNAL MEDICINE

## 2019-05-30 PROCEDURE — 4040F PNEUMOC VAC/ADMIN/RCVD: CPT | Performed by: INTERNAL MEDICINE

## 2019-05-30 PROCEDURE — 1036F TOBACCO NON-USER: CPT | Performed by: INTERNAL MEDICINE

## 2019-05-30 PROCEDURE — G8427 DOCREV CUR MEDS BY ELIG CLIN: HCPCS | Performed by: INTERNAL MEDICINE

## 2019-05-30 ASSESSMENT — ENCOUNTER SYMPTOMS
BACK PAIN: 1
RESPIRATORY NEGATIVE: 1
GASTROINTESTINAL NEGATIVE: 1

## 2019-05-30 NOTE — PROGRESS NOTES
signs of infection such as fevers, chills or worsening back pain. I have personally reviewedthe past medical history, medications, social history, and I have updated the database accordingly. Past Medical History:     Past Medical History:   Diagnosis Date    A-fib [facility])     AAA (abdominal aortic aneurysm) without rupture ([address] 9/20/2017    Acute cystitis without hematuria 9/23/2017    Acute osteomyelitis of thoracic spine ([address] 11/8/2017    Arthritis     Left knee     Atrial fibrillation (HCC) 9/23/2017    Chronic back pain     and knee pain/ sees chiropractor    Class 2 obesity due to excess calories with serious comorbidity in adult     COPD (chronic obstructive pulmonary disease) ([address]     DDD (degenerative disc disease), thoracolumbar 9/21/2017    Decubitus ulcer     buttock, stage 3    Decubitus ulcer of left buttock, stage 3 (HCC)     Decubitus ulcer of left buttock, stage 4 ([address] 3/22/2018    Decubitus ulcer of sacral region, stage 3 ([address] 11/12/2017    Degenerative spondylolisthesis 9/21/2017    Elevated PSA 9/21/2017    Hyperlipidemia     Iron deficiency anemia due to chronic blood loss     Knee effusion, left 9/21/2017    Lumbar stenosis     L4-L5    Lumbar stenosis with neurogenic claudication 9/21/2017    MSSA (methicillin susceptible Staphylococcus aureus)     Osteomyelitis (HCC)     Thoracic spine    Osteomyelitis of thoracic region ([address]     Prediabetes 9/24/2017    Primary osteoarthritis of left knee 9/21/2017    Prostate enlargement     Severe malnutrition ([address] 9/24/2017    Sleep apnea     wears c-pap    Staphylococcal septicemia ([address]     Thoracic back pain     T4-T5.  Chronic     Thoracic discitis 11/8/2017    Thrombocytopenia (HCC) 9/23/2017     Medications:     Current Outpatient Medications   Medication Sig Dispense Refill    aspirin 81 MG tablet Take 81 mg by mouth daily      Pediatric Multiple Vit-C-FA (FRUITY CHEWABLES MULTIVITAMIN PO) Take by mouth  Misc Natural Products (PRO NUTRIENTS FRUIT & VEGGIE PO) Take by mouth      CASCARA SAGRADA PO Take by mouth      Casanthranol-Docusate Sodium (STOOL SOFTENER PLUS PO) Take by mouth      atorvastatin (LIPITOR) 20 MG tablet Take 20 mg by mouth      tamsulosin (FLOMAX) 0.4 MG capsule Take 0.4 mg by mouth daily      gabapentin (NEURONTIN) 100 MG capsule Take 1 capsule by mouth daily for 30 days. . 90 capsule 1     No current facility-administered medications for this visit. Allergies:   Latex; Ibuprofen; Penicillins; and Cephalosporins     Review of Systems:   Review of Systems   Constitutional: Negative. Respiratory: Negative. Cardiovascular: Negative. Gastrointestinal: Negative. Genitourinary: Negative. Musculoskeletal: Positive for back pain. Neurological: Negative. Physical Examination :   /78 (Site: Left Upper Arm)   Pulse 69   Temp 97.1 °F (36.2 °C)   Ht 6' 1\" (1.854 m)   Wt 255 lb (115.7 kg)   BMI 33.64 kg/m²     Physical Exam   Constitutional: He is oriented to person, place, and time. HENT:   Head: Normocephalic and atraumatic. Cardiovascular: Normal rate and normal heart sounds. Pulmonary/Chest: Effort normal and breath sounds normal.   Abdominal: Soft. Bowel sounds are normal.   Musculoskeletal: Normal range of motion. He exhibits tenderness (Mid thoracic spine pain). He exhibits no edema. Neurological: He is alert and oriented to person, place, and time. Skin: Skin is warm and dry.        Laboratory studies :  Medical Decision Making:   I have independently reviewed the following labs:  CBC with Differential:  Lab Results   Component Value Date    WBC 7.2 03/01/2019    WBC 6.3 12/03/2017    HGB 14.9 03/01/2019    HGB 8.4 12/03/2017    HCT 43.6 03/01/2019    HCT 26.5 12/03/2017     03/01/2019     12/03/2017    LYMPHOPCT 9 11/14/2017    LYMPHOPCT 9 11/12/2017    MONOPCT 9 11/14/2017    MONOPCT 9 11/12/2017       BMP:  Lab Results

## 2019-11-06 NOTE — PROGRESS NOTES
[facility] - Daily Progress Note  Today's Date and Time: 11/14/2017, 12:27 PM    Impression :   · T4 to 5, T9 to 10, T12 and L1 discitis and osteomyelitis with possible pathologic fractures - status post CT-guided biopsy of T12-L1 disc 11/10/17 and culture with MSSA  · Recent MSSA sepsis - completed 6 weeks of vancomycin on November 30. · Left gluteal, stage III, pressure related ulcer, currently not infected -status post bedside debridement 11/13/17  · Recent left knee septic arthritis -status post drainage. · Abdominal aortic aneurysm, 3.9 cm  · Underlying COPD    Recommendations:   · Continue intravenous antimicrobial therapy with vancomycin through January 30, 2017 to complete 12 weeks of antimicrobial therapy  · The plan is for him to be discharged to [facility] tomorrow. · The patient will be seen by Dr. Sobeida Meyers while there   · Infectious disease wise he is okay for discharge. Interval History:   Naga Parra is a 76y.o.-year-old male who was initially admitted on 11/7/2017. Mary Ordonez is known to me from his recent hospitalization when he had methicillin susceptible Staphylococcus aureus sepsis, MSSA UTI related to her left knee septic arthritis for which he underwent drainage. The patient also had left knee pseudogout and he had pretty significant back pain and I have a high clinical suspicion for discitis/osteomyelitis. The patient did receive vancomycin through October 31 to complete a six-week course. 5 days prior to admission the patient reported worsening back pain so severe that it was limiting his ability to ambulate. He did not have any associated fevers or chills and workup in the ED with a CAT scan showed show multilevel discitis and osteomyelitis as well as possible pathologic fractures. The patient had IR guided biopsy done on November 10, 2017    Current evaluation: 11/14/2017  The patient is seen and evaluated at bedside with his daughter.   He PHYSICIAN NEXT STEPS:  Review Only    CHIEF COMPLAINT:  Chief Complaint/Protocol Used: Nosebleed  Onset: On and off for the last 2 weeks.       ASSESSMENT:  ? Onset: On and off for the last 2 weeks.   ? Normal True  ? Normal, no trouble breathing True  ? Amount Of Bleeding: Severe.  ? Onset: On and off for the last 2 weeks.   ? Frequency: Last episode was today 2 times.   ? Recurrent Symptoms: Recent.  ? Cause: The weather.  ? Local Factors: Denies.  ? Systemic Factors: Denies.  ? Blood Thinners: Denies.  ? Other Symptoms: Lightheaded due to the sight of blood.  ? Pregnancy: No.  -------------------------------------------------------    DISPOSITION:  Disposition Recommendation: See Physician within 24 Hours  Questions that led to disposition:  ? [1] Bleeding recurs 3 or more times in 24 hours AND [2] direct pressure applied correctly  Patient Directed To: Unspecified  Patient Intended Action: Seek care in the doctor's office       CALL NOTES:  11/05/2019 at 6:36 PM by Eli Huggins  ? 24 hour disposition. No appointment available with PCP and has agreed to be seen by another provider. Appointment made. Advised to call back for any health concerns.    DISPOSITION OVERRIDE/PROVIDER CONSULT:  Disposition Override: N/A  Override Source: Unspecified  Consulted with PCP: No  Consulted with On-Call Physician: No    CALLER CONTACT INFO:  Name: Isi Britt (Self)  Phone 1: [phone] (Home Phone)      ENCOUNTER STARTED:  11/05/19 06:21:22 PM  ENCOUNTER ASSIGNED TO/CLOSED BY:  Eli Huggins @ 11/05/19 06:36:27 PM      -------------------------------------------------------    CARE ADVICE given per Nosebleed guideline.  SEE PHYSICIAN WITHIN 24 HOURS:   * IF OFFICE WILL BE OPEN: You need to be seen within the next 24 hours. Call your doctor when the office opens, and make an appointment.  * IF OFFICE WILL BE CLOSED AND NO PCP TRIAGE: You need to be seen within the next 24 hours. An urgent care center is often a good  reports sleeping on his right side overnights to relieve the pressure on the gluteal pressure related ulcer  He reports that this morning he has had more back pain he thinks is related to him sleeping on his right side. He does not report any fevers or chills. Plans are being made for him to be discharged to [facility]. Physical Examination :   /67   Pulse 88   Temp 98.2 °F (36.8 °C) (Oral)   Resp 22   Ht 6' 1\" (1.854 m)   Wt 243 lb 9.6 oz (110.5 kg)   SpO2 100%   BMI 32.14 kg/m²     Temperature Range: Temp: 98.2 °F (36.8 °C) Temp  Av.2 °F (36.8 °C)  Min: 98.1 °F (36.7 °C)  Max: 98.4 °F (36.9 °C)     Physical Exam   Constitutional: He is oriented to person, place, and time. HENT:   Head: Normocephalic and atraumatic. Cardiovascular: Normal rate and regular rhythm. Pulmonary/Chest: Effort normal and breath sounds normal.   Abdominal: Soft. Bowel sounds are normal.   Musculoskeletal: Normal range of motion. Neurological: He is alert and oriented to person, place, and time. Skin: Skin is warm and dry. The dressing over the left gluteal ulceration was not removed       Laboratory data:   I have independently reviewed the following labs:  CBC with Differential:   Recent Labs      17   0637  17   0558   WBC  7.5  9.7   HGB  9.8*  9.5*   HCT  29.8*  28.9*   PLT  322  300   LYMPHOPCT  9  9   MONOPCT  9  9     BMP:   Recent Labs      17   0631  17   0558   NA  135  133*   K  4.0  3.9   CL  96*  95*   CO2  28  27   BUN  9  11   CREATININE  0.52*  0.56*     Hepatic Function Panel:   Recent Labs      17   0637   PROT  6.3*   LABALBU  2.3*   BILITOT  0.50   ALKPHOS  74   ALT  24   AST  20        No results for input(s): VANCOTROUGH in the last 72 hours.    Lab Results   Component Value Date    CRP 63.5 (H) 2017     Lab Results   Component Value Date    SEDRATE 97 (H) 2017       Imaging Studies:   No new imaging    Cultures:     Culture Blood #1 source of care if your doctor's office is closed.  * IF OFFICE WILL BE CLOSED AND PCP TRIAGE REQUIRED: You may need to be seen within the next 24 hours. Your doctor will want to talk with you to decide what's best. I'll page the doctor now. NOTE: Since this isn't serious, hold the page between 10 pm and   7 am. Page the doctor in the morning.  * IF PATIENT HAS NO PCP: Refer patient to an Urgent Care Center or Retail Clinic. Also try to help caller find a PCP (medical home) for their future care. ?; TREATING A NOSEBLEED - PINCH THE NOSTRILS:    * First blow the nose to clear out any large clots.   * Sit down and lean forward. (Reason: blood makes people choke if they lean backwards).   * Gently squeeze the soft parts of the lower nose (nostrils) together. Use your thumb and your index finger in a pinching manner. Do this for 15 minutes. Use a clock or watch to measure the time. (Goal: apply continuous pressure to the bleeding point.)    * If the bleeding continues after 15 minutes of squeezing, move your point of pressure and repeat again for another 15 minutes.; CALL BACK IF:    * Nosebleeding lasts longer than 30 minutes with using direct pressure   * Lightheadedness or weakness occurs   * Nosebleeds become worse   * You become worse.      UNDERSTANDS CARE ADVICE: Yes    AGREES WITH CARE ADVICE: Yes    WILL FOLLOW CARE ADVICE: Yes    -------------------------------------------------------   [[id number]] Collected: 11/07/17 1840   Order Status: Completed Specimen: Blood Updated: 11/13/17 0704    Specimen Description . BLOOD 12ML RFA JK Performed at [address] [place] [address] [address] [facility]    Specimen Description Uriah Angel [facility] [place] [phone]    Special Requests NOT REPORTED    Culture NO GROWTH 6 DAYS    Culture Performed at [facility] [address] [address] [phone]    Status FINAL 11/13/2017   Culture Blood #1 [[id number]] Collected: 11/07/17 1842   Order Status: Completed Specimen: Blood Updated: 11/13/17 0704    Specimen Description . BLOOD 12ML RAC JK Performed at [facility] [address]    Specimen Description Cheryl Angel0 [facility] [place] [phone]    Special Requests NOT REPORTED    Culture NO GROWTH 6 DAYS    Culture Performed at [facility] [address] [address] [phone]    Status FINAL 11/13/2017     Cult,Aerobe/Anaerobe [[id number]] (Abnormal)  Collected: 11/10/17 1728   Order Status: Completed Specimen: Spine Updated: 11/12/17 1807    Specimen Description . SPINE Performed at [address] [place] [address] [address] [place], [place]    Specimen Description  [id number] [phone]    Special Requests NOT REPORTED    Direct Exam NO NEUTROPHILS SEEN    Direct Exam NO BACTERIA SEEN    Culture STAPHYLOCOCCUS AUREUS SCANT GROWTH This isolate is methicillin susceptible.  (A)    Culture NO ANAEROBIC ORGANISMS ISOLATED AT 2 DAYS (A)    Culture Performed at [address] [phone]    Status Pending    Organism SAUR   STAPHYLOCOCCUS AUREUS     Antibiotic Interpretation TOOTIE Status   Induced Clind Resist  NOT REPORTED Final   Synercid  NOT REPORTED Final   cefoxitin screen  NOT REPORTED Final   ciprofloxacin  NOT REPORTED Final   clindamycin Sensitive <=0.25 SUSCEPTIBLE Final   erythromycin Sensitive <=0.25 SUSCEPTIBLE Final   gentamicin Sensitive <=0.5 SUSCEPTIBLE Final   levofloxacin Sensitive <=0.12 SUSCEPTIBLE Final   linezolid  NOT REPORTED Final   moxifloxacin  NOT REPORTED Final   nitrofurantoin  NOT REPORTED Final   oxacillin Sensitive <=0.25 SUSCEPTIBLE Final   penicillin Resistant >=0.5 RESISTANT Final   rifampin  NOT REPORTED Final   tetracycline Sensitive <=1 SUSCEPTIBLE Final   tigecycline  NOT REPORTED Final   trimethoprim-sulfamethoxazole Sensitive <=10 SUSCEPTIBLE Final   vancomycin  NOT REPORTED Final     Cult,Wound [[id number]] (Abnormal) Collected: 11/07/17 2001   Order Status: Completed Specimen: Buttocks Updated: 11/09/17 0723    Specimen Description . BUTTOCK, LEFT    Special Requests NOT REPORTED    Direct Exam FEW NEUTROPHILS (A)    Direct Exam MODERATE BUDDING YEAST (A)    Direct Exam FEW GRAM POSITIVE COCCI IN PAIRS (A)    Culture PRESUMPTIVE CANDIDA ALBICANS HEAVY GROWTH (A)    Culture NORMAL SKIN JOB (A)    Culture Performed at [address] [phone]    Status FINAL 11/09/2017   MRSA SCREENING CULTURE ONLY [[id number]] Collected: 11/08/17 2228   Order Status: Completed Specimen: Nares Updated: 11/09/17 0027    Specimen Description . NARES Performed at [address] [place] [address] [address] [place], [place]    Specimen Description  [id number] [phone]    Special Requests NOT REPORTED    Culture DUE TO THE SPECIMEN TYPE, THE ORDER WAS CANCELED AND REORDERED. PLEASE REFER TO:    Culture [id number]    Culture Performed at [address] [phone]    Status FINAL 11/09/2017   Urine culture [[id number]] Collected: 11/07/17 2126   Order Status: Completed Specimen: Urine voided Updated: 11/08/17 2313    Specimen Description . CLEAN CATCH URINE Performed at [address] [place] [address] [facility]    Specimen Description [place], [facility] [phone]    Special Requests NOT REPORTED    Culture NO SIGNIFICANT GROWTH    Culture Performed at [address] [phone]    Status FINAL 11/08/2017     Medications:      sodium chloride flush  10 mL Intravenous 2 times per day    fentaNYL  1 patch Transdermal Q72H    apixaban  5 mg Oral BID    ferrous sulfate  325 mg Oral BID WC    tiotropium  18 mcg Inhalation Daily    vancomycin  1,500 mg Intravenous Q12H    vancomycin (VANCOCIN) intermittent dosing (placeholder)   Other RX Placeholder    cefepime  2 g Intravenous Q12H    albuterol  2.5 mg Nebulization 4x Daily    atorvastatin  20 mg Oral Daily    doxazosin  4 mg Oral Nightly    pantoprazole  40 mg Oral QAM AC    tamsulosin  0.4 mg Oral Daily    sodium chloride flush  10 mL Intravenous 2 times per day    cetirizine  10 mg Oral Daily    guaiFENesin  400 mg Oral Q6H     Thank you for allowing us to participate in the care of this patient. Please call with questions. Raman Fallon M.D.   Perfect [employer] messaging [phone]

## 2021-01-04 ENCOUNTER — HOSPITAL ENCOUNTER (OUTPATIENT)
Dept: GENERAL RADIOLOGY | Age: 81
Discharge: HOME | End: 2021-01-06
Payer: MEDICARE

## 2021-01-04 ENCOUNTER — HOSPITAL ENCOUNTER (OUTPATIENT)
Dept: PREADMISSION TESTING | Age: 81
Discharge: HOME | End: 2021-01-08
Payer: MEDICARE

## 2021-01-04 VITALS
HEIGHT: 73 IN | TEMPERATURE: 98.1 F | DIASTOLIC BLOOD PRESSURE: 74 MMHG | RESPIRATION RATE: 16 BRPM | HEART RATE: 82 BPM | WEIGHT: 238 LBS | SYSTOLIC BLOOD PRESSURE: 146 MMHG | OXYGEN SATURATION: 99 % | BODY MASS INDEX: 31.54 KG/M2

## 2021-01-04 LAB
ABSOLUTE EOS #: 0.47 K/UL (ref 0–0.44)
ABSOLUTE IMMATURE GRANULOCYTE: 0.07 K/UL (ref 0–0.3)
ABSOLUTE LYMPH #: 1.49 K/UL (ref 1.1–3.7)
ABSOLUTE MONO #: 0.62 K/UL (ref 0.1–1.2)
ALBUMIN SERPL-MCNC: 3.7 G/DL (ref 3.5–5.2)
ALBUMIN/GLOBULIN RATIO: ABNORMAL (ref 1–2.5)
ALP BLD-CCNC: 85 U/L (ref 40–129)
ALT SERPL-CCNC: 11 U/L (ref 5–41)
ANION GAP SERPL CALCULATED.3IONS-SCNC: 11 MMOL/L (ref 9–17)
AST SERPL-CCNC: 15 U/L
BASOPHILS # BLD: 1 % (ref 0–2)
BASOPHILS ABSOLUTE: 0.07 K/UL (ref 0–0.2)
BILIRUB SERPL-MCNC: 0.52 MG/DL (ref 0.3–1.2)
BILIRUBIN URINE: NEGATIVE
BUN BLDV-MCNC: 19 MG/DL (ref 8–23)
BUN/CREAT BLD: 22 (ref 9–20)
CALCIUM SERPL-MCNC: 9.3 MG/DL (ref 8.6–10.4)
CHLORIDE BLD-SCNC: 104 MMOL/L (ref 98–107)
CO2: 23 MMOL/L (ref 20–31)
COLOR: YELLOW
COMMENT UA: NORMAL
CREAT SERPL-MCNC: 0.87 MG/DL (ref 0.7–1.2)
DIFFERENTIAL TYPE: ABNORMAL
EOSINOPHILS RELATIVE PERCENT: 6 % (ref 1–4)
GFR AFRICAN AMERICAN: >60 ML/MIN
GFR NON-AFRICAN AMERICAN: >60 ML/MIN
GFR SERPL CREATININE-BSD FRML MDRD: ABNORMAL ML/MIN/{1.73_M2}
GFR SERPL CREATININE-BSD FRML MDRD: ABNORMAL ML/MIN/{1.73_M2}
GLUCOSE BLD-MCNC: 93 MG/DL (ref 70–99)
GLUCOSE URINE: NEGATIVE
HCT VFR BLD CALC: 43 % (ref 40.7–50.3)
HEMOGLOBIN: 14.6 G/DL (ref 13–17)
IMMATURE GRANULOCYTES: 1 %
INR BLD: 1
KETONES, URINE: NEGATIVE
LEUKOCYTE ESTERASE, URINE: NEGATIVE
LYMPHOCYTES # BLD: 18 % (ref 24–43)
MCH RBC QN AUTO: 31 PG (ref 25.2–33.5)
MCHC RBC AUTO-ENTMCNC: 34 G/DL (ref 28.4–34.8)
MCV RBC AUTO: 91.3 FL (ref 82.6–102.9)
MONOCYTES # BLD: 8 % (ref 3–12)
NITRITE, URINE: NEGATIVE
NRBC AUTOMATED: 0 PER 100 WBC
PARTIAL THROMBOPLASTIN TIME: 31.9 SEC (ref 23.9–33.8)
PDW BLD-RTO: 13.8 % (ref 11.8–14.4)
PH UA: 5.5 (ref 5–8)
PLATELET # BLD: 169 K/UL (ref 138–453)
PLATELET ESTIMATE: ABNORMAL
PMV BLD AUTO: 11.3 FL (ref 8.1–13.5)
POTASSIUM SERPL-SCNC: 4.1 MMOL/L (ref 3.7–5.3)
PROTEIN UA: NEGATIVE
PROTHROMBIN TIME: 13.2 SEC (ref 11.5–14.2)
RBC # BLD: 4.71 M/UL (ref 4.21–5.77)
RBC # BLD: ABNORMAL 10*6/UL
SEG NEUTROPHILS: 66 % (ref 36–65)
SEGMENTED NEUTROPHILS ABSOLUTE COUNT: 5.51 K/UL (ref 1.5–8.1)
SODIUM BLD-SCNC: 138 MMOL/L (ref 135–144)
SPECIFIC GRAVITY UA: 1.03 (ref 1–1.03)
TOTAL PROTEIN: 7.5 G/DL (ref 6.4–8.3)
TURBIDITY: CLEAR
URINE HGB: NEGATIVE
UROBILINOGEN, URINE: NORMAL
WBC # BLD: 8.2 K/UL (ref 3.5–11.3)
WBC # BLD: ABNORMAL 10*3/UL

## 2021-01-04 PROCEDURE — 86850 RBC ANTIBODY SCREEN: CPT

## 2021-01-04 PROCEDURE — 71046 X-RAY EXAM CHEST 2 VIEWS: CPT

## 2021-01-04 PROCEDURE — 85610 PROTHROMBIN TIME: CPT

## 2021-01-04 PROCEDURE — 85025 COMPLETE CBC W/AUTO DIFF WBC: CPT

## 2021-01-04 PROCEDURE — 36415 COLL VENOUS BLD VENIPUNCTURE: CPT

## 2021-01-04 PROCEDURE — 86900 BLOOD TYPING SEROLOGIC ABO: CPT

## 2021-01-04 PROCEDURE — 80053 COMPREHEN METABOLIC PANEL: CPT

## 2021-01-04 PROCEDURE — 86920 COMPATIBILITY TEST SPIN: CPT

## 2021-01-04 PROCEDURE — 81003 URINALYSIS AUTO W/O SCOPE: CPT

## 2021-01-04 PROCEDURE — 86901 BLOOD TYPING SEROLOGIC RH(D): CPT

## 2021-01-04 PROCEDURE — 85730 THROMBOPLASTIN TIME PARTIAL: CPT

## 2021-01-04 RX ORDER — CLINDAMYCIN PHOSPHATE 900 MG/50ML
900 INJECTION INTRAVENOUS ONCE
Status: CANCELLED | OUTPATIENT
Start: 2021-01-19

## 2021-01-04 NOTE — PRE-PROCEDURE INSTRUCTIONS
[facility] ON Tuesday, Jan 19,2021   at 07:00 AM    Call [phone] when you arrive to the hospital  No visitors in surgery area at this time      Continue to take your home medications as you normally do up to and including the night before surgery with the exception of any blood thinning medications. Please stop any blood thinning medications as directed by your surgeon or prescribing physician. Failure to stop certain medications may interfere with your scheduled surgery. These may include:  Aspirin, Warfarin (Coumadin), Clopidogrel (Plavix), Ibuprofen (Motrin, Advil), Naproxen (Aleve), Meloxicam (Mobic), Celecoxib (Celebrex), Eliquis, Pradaxa, Xarelto, Effient, Fish Oil, Herbal supplements. Check with  regarding Aspirin & if you need to stop it      Please take the following medication(s) the day of surgery with a small sip of water:  None      PREPARING FOR YOUR SURGERY:     Before surgery, you can play an important role in your own health. Because skin is not sterile, we need to be sure that your skin is as free of germs as possible before surgery by carefully washing before surgery. Preparing or prepping skin before surgery can reduce the risk of a surgical site infection.   Do not shave the area of your body where your surgery will be performed unless you received specific permission from your physician. You will need to shower at home the night before surgery and the morning of surgery with a special soap called chlorhexidine gluconate (CHG*). *Not to be used by people allergic to Chlorhexidine Gluconate (CHG). Following these instructions will help you be sure that your skin is clean before surgery. Instructions on cleaning your skin before surgery: The night before your surgery:     ? You will need to shower with warm water (not hot) and the CHG soap. ? Use a clean wash cloth and a clean towel. Have clean clothes available to put on after the shower. ?  First wash your hair with regular shampoo. Rinse your hair and body thoroughly to remove the shampoo. ? Wash your face and genital area (private parts) with your regular soap or water only. Thoroughly rinse your body with warm water from the neck down. ? Turn water off to prevent rinsing the soap off too soon. ? With a clean wet washcloth and half of the CHG soap in the bottle, lather your entire body from the neck down. Do not use CHG soap near your eyes or ears to avoid injury to those areas. ? Wash thoroughly, paying special attention to the area where your surgery will be performed. ? Wash your body gently for five (5) minutes. Avoid scrubbing your skin too hard. ? Turn the water back on and rinse your body thoroughly. ? Pat yourself dry with a clean, soft towel. Do not apply lotion, cream or powder. ? Dress with clean freshly washed clothes. The morning of surgery:    ? Repeat shower following steps above - using remaining half of CHG soap in bottle. Patient Instructions:    ? If you are having any type of anesthesia you are to have nothing to eat or drink after midnight the night before your surgery. This includes gum, hard candy, mints, water or smoking or chewing tobacco.  The only exception to this is a small sip of water to take with any morning dose of heart, blood pressure, or seizure medications. No alcoholic beverages for 24 hours prior to surgery. ? Brush your teeth but do not swallow water. ? Bring your eyeglasses and case with you. No contacts are to be worn the day of surgery. You also may bring your hearing aids. Most surgical procedures involving anesthesia will require that you remove your dentures prior to surgery. ? If you are on C-PAP or Bi-PAP at home and plan on staying in the hospital overnight for your surgery please bring the machine with you. · Do not wear any jewelry or body piercings day of surgery. Also, NO lotion, perfume or deodorant to be used the day of surgery. No nail polish on the operative extremity (arm/leg surgeries)    · If you are staying overnight with us, please bring a small bag of necessary personal items. ? Please wear loose, comfortable clothing. If you are potentially going to have a cast or brace bring clothing that will fit over them. ? In case of illness  If you have cold or flu like symptoms (high fever, runny nose, sore throat, cough, etc.) rash, nausea, vomiting, loose stools, and/or recent contact with someone who has a contagious disease (chicken pox, measles, etc.) Please call your doctor before coming to the hospital.         Day of Surgery/Procedure:    As a patient at [facility] you can expect quality medical and nursing care that is centered on your individual needs. Our goal is to make your surgical experience as comfortable as possible    . Transportation After Your Surgery/Procedure: You will need a friend or family member to drive you home after your procedure. Your  must be 25years of age or older and able to sign off on your discharge instructions. A taxi cab or any other form of public transportation is not acceptable. Someone must remain with you for the first 24 hours after your surgery if you receive anesthesia or medication. If you do not have someone to stay with you, your procedure may be cancelled.       If you have any other questions regarding your procedure or the day of surgery, please call [phone]      _________________________  ____________________________ Signature (Patient)              Signature (Provider) & date

## 2021-01-05 NOTE — PROGRESS NOTES
Dr. Umesh Mckinney wanted to know if patient had a cough or shortness of breath due to reviewing the chest xray.

## 2021-01-15 ENCOUNTER — HOSPITAL ENCOUNTER (OUTPATIENT)
Dept: LAB | Age: 81
Setting detail: SPECIMEN
Discharge: HOME | End: 2021-01-15
Payer: MEDICARE

## 2021-01-15 DIAGNOSIS — Z01.818 PREOP TESTING: Primary | ICD-10-CM

## 2021-01-15 PROCEDURE — U0005 INFEC AGEN DETEC AMPLI PROBE: HCPCS

## 2021-01-15 PROCEDURE — U0003 INFECTIOUS AGENT DETECTION BY NUCLEIC ACID (DNA OR RNA); SEVERE ACUTE RESPIRATORY SYNDROME CORONAVIRUS 2 (SARS-COV-2) (CORONAVIRUS DISEASE [COVID-19]), AMPLIFIED PROBE TECHNIQUE, MAKING USE OF HIGH THROUGHPUT TECHNOLOGIES AS DESCRIBED BY CMS-2020-01-R: HCPCS

## 2021-01-17 LAB
SARS-COV-2, RAPID: NORMAL
SARS-COV-2: NORMAL
SARS-COV-2: NOT DETECTED
SOURCE: NORMAL

## 2021-01-18 ENCOUNTER — ANESTHESIA EVENT (OUTPATIENT)
Dept: OPERATING ROOM | Age: 81
DRG: 269 | End: 2021-01-18
Payer: MEDICARE

## 2021-01-18 ENCOUNTER — TELEPHONE (OUTPATIENT)
Dept: PRIMARY CARE CLINIC | Age: 81
End: 2021-01-18

## 2021-01-19 ENCOUNTER — ANESTHESIA (OUTPATIENT)
Dept: OPERATING ROOM | Age: 81
DRG: 269 | End: 2021-01-19
Payer: MEDICARE

## 2021-01-19 ENCOUNTER — HOSPITAL ENCOUNTER (INPATIENT)
Age: 81
LOS: 1 days | Discharge: HOME | DRG: 269 | End: 2021-01-20
Attending: SURGERY | Admitting: SURGERY
Payer: MEDICARE

## 2021-01-19 VITALS — DIASTOLIC BLOOD PRESSURE: 57 MMHG | OXYGEN SATURATION: 97 % | TEMPERATURE: 85.6 F | SYSTOLIC BLOOD PRESSURE: 121 MMHG

## 2021-01-19 DIAGNOSIS — I71.40 AAA (ABDOMINAL AORTIC ANEURYSM) WITHOUT RUPTURE: Primary | Chronic | ICD-10-CM

## 2021-01-19 LAB
HCT VFR BLD CALC: 40.4 % (ref 40.7–50.3)
HCT VFR BLD CALC: 41.8 % (ref 40.7–50.3)
HEMOGLOBIN: 13.2 G/DL (ref 13–17)
HEMOGLOBIN: 13.8 G/DL (ref 13–17)
MCH RBC QN AUTO: 29.9 PG (ref 25.2–33.5)
MCH RBC QN AUTO: 30.3 PG (ref 25.2–33.5)
MCHC RBC AUTO-ENTMCNC: 32.7 G/DL (ref 28.4–34.8)
MCHC RBC AUTO-ENTMCNC: 33 G/DL (ref 28.4–34.8)
MCV RBC AUTO: 91.4 FL (ref 82.6–102.9)
MCV RBC AUTO: 91.9 FL (ref 82.6–102.9)
NRBC AUTOMATED: 0 PER 100 WBC
NRBC AUTOMATED: 0 PER 100 WBC
PDW BLD-RTO: 13.9 % (ref 11.8–14.4)
PDW BLD-RTO: 13.9 % (ref 11.8–14.4)
PLATELET # BLD: 123 K/UL (ref 138–453)
PLATELET # BLD: 124 K/UL (ref 138–453)
PMV BLD AUTO: 10.9 FL (ref 8.1–13.5)
PMV BLD AUTO: 11 FL (ref 8.1–13.5)
RBC # BLD: 4.42 M/UL (ref 4.21–5.77)
RBC # BLD: 4.55 M/UL (ref 4.21–5.77)
WBC # BLD: 6.4 K/UL (ref 3.5–11.3)
WBC # BLD: 7.3 K/UL (ref 3.5–11.3)

## 2021-01-19 PROCEDURE — 6360000004 HC RX CONTRAST MEDICATION: Performed by: SURGERY

## 2021-01-19 PROCEDURE — 36415 COLL VENOUS BLD VENIPUNCTURE: CPT

## 2021-01-19 PROCEDURE — 3700000001 HC ADD 15 MINUTES (ANESTHESIA): Performed by: SURGERY

## 2021-01-19 PROCEDURE — 2709999900 HC NON-CHARGEABLE SUPPLY: Performed by: SURGERY

## 2021-01-19 PROCEDURE — 3700000000 HC ANESTHESIA ATTENDED CARE: Performed by: SURGERY

## 2021-01-19 PROCEDURE — C1894 INTRO/SHEATH, NON-LASER: HCPCS | Performed by: SURGERY

## 2021-01-19 PROCEDURE — 2500000003 HC RX 250 WO HCPCS

## 2021-01-19 PROCEDURE — 6360000002 HC RX W HCPCS: Performed by: SURGERY

## 2021-01-19 PROCEDURE — 2780000010 HC IMPLANT OTHER: Performed by: SURGERY

## 2021-01-19 PROCEDURE — 7100000001 HC PACU RECOVERY - ADDTL 15 MIN: Performed by: SURGERY

## 2021-01-19 PROCEDURE — 2500000003 HC RX 250 WO HCPCS: Performed by: SURGERY

## 2021-01-19 PROCEDURE — 85027 COMPLETE CBC AUTOMATED: CPT

## 2021-01-19 PROCEDURE — 2580000003 HC RX 258: Performed by: ANESTHESIOLOGY

## 2021-01-19 PROCEDURE — 6360000002 HC RX W HCPCS

## 2021-01-19 PROCEDURE — 6370000000 HC RX 637 (ALT 250 FOR IP): Performed by: SURGERY

## 2021-01-19 PROCEDURE — 04V03DZ RESTRICTION OF ABDOMINAL AORTA WITH INTRALUMINAL DEVICE, PERCUTANEOUS APPROACH: ICD-10-PCS | Performed by: SURGERY

## 2021-01-19 PROCEDURE — 3600000012 HC SURGERY LEVEL 2 ADDTL 15MIN: Performed by: SURGERY

## 2021-01-19 PROCEDURE — C1725 CATH, TRANSLUMIN NON-LASER: HCPCS | Performed by: SURGERY

## 2021-01-19 PROCEDURE — 6360000002 HC RX W HCPCS: Performed by: ANESTHESIOLOGY

## 2021-01-19 PROCEDURE — C1887 CATHETER, GUIDING: HCPCS | Performed by: SURGERY

## 2021-01-19 PROCEDURE — 2580000003 HC RX 258: Performed by: SURGERY

## 2021-01-19 PROCEDURE — C1769 GUIDE WIRE: HCPCS | Performed by: SURGERY

## 2021-01-19 PROCEDURE — 7100000000 HC PACU RECOVERY - FIRST 15 MIN: Performed by: SURGERY

## 2021-01-19 PROCEDURE — 3600000002 HC SURGERY LEVEL 2 BASE: Performed by: SURGERY

## 2021-01-19 PROCEDURE — 2000000000 HC ICU R&B

## 2021-01-19 PROCEDURE — 2720000010 HC SURG SUPPLY STERILE: Performed by: SURGERY

## 2021-01-19 PROCEDURE — 2580000003 HC RX 258

## 2021-01-19 DEVICE — GRAFT EVAR 20FR L103MM DIA32X14MM HI DENS MULTIFILAMENT: Type: IMPLANTABLE DEVICE | Site: AORTA | Status: FUNCTIONAL

## 2021-01-19 DEVICE — GRAFT EVAR L156MM DIA16X13MM CATH 16FR LIMB DST DSGN C DEL: Type: IMPLANTABLE DEVICE | Site: AORTA | Status: FUNCTIONAL

## 2021-01-19 RX ORDER — OXYCODONE HYDROCHLORIDE AND ACETAMINOPHEN 5; 325 MG/1; MG/1
1 TABLET ORAL EVERY 4 HOURS PRN
Status: DISCONTINUED | OUTPATIENT
Start: 2021-01-19 | End: 2021-01-20 | Stop reason: HOSPADM

## 2021-01-19 RX ORDER — SODIUM CHLORIDE, SODIUM LACTATE, POTASSIUM CHLORIDE, CALCIUM CHLORIDE 600; 310; 30; 20 MG/100ML; MG/100ML; MG/100ML; MG/100ML
INJECTION, SOLUTION INTRAVENOUS CONTINUOUS
Status: DISCONTINUED | OUTPATIENT
Start: 2021-01-19 | End: 2021-01-19

## 2021-01-19 RX ORDER — ASPIRIN 81 MG/1
81 TABLET ORAL DAILY
Status: DISCONTINUED | OUTPATIENT
Start: 2021-01-19 | End: 2021-01-20 | Stop reason: HOSPADM

## 2021-01-19 RX ORDER — HYDRALAZINE HYDROCHLORIDE 20 MG/ML
5 INJECTION INTRAMUSCULAR; INTRAVENOUS EVERY 10 MIN PRN
Status: DISCONTINUED | OUTPATIENT
Start: 2021-01-19 | End: 2021-01-19 | Stop reason: HOSPADM

## 2021-01-19 RX ORDER — PROTAMINE SULFATE 10 MG/ML
INJECTION, SOLUTION INTRAVENOUS PRN
Status: DISCONTINUED | OUTPATIENT
Start: 2021-01-19 | End: 2021-01-19 | Stop reason: SDUPTHER

## 2021-01-19 RX ORDER — MORPHINE SULFATE 4 MG/ML
4 INJECTION, SOLUTION INTRAMUSCULAR; INTRAVENOUS
Status: DISCONTINUED | OUTPATIENT
Start: 2021-01-19 | End: 2021-01-20 | Stop reason: HOSPADM

## 2021-01-19 RX ORDER — SODIUM CHLORIDE, SODIUM LACTATE, POTASSIUM CHLORIDE, CALCIUM CHLORIDE 600; 310; 30; 20 MG/100ML; MG/100ML; MG/100ML; MG/100ML
INJECTION, SOLUTION INTRAVENOUS CONTINUOUS
Status: DISCONTINUED | OUTPATIENT
Start: 2021-01-19 | End: 2021-01-20 | Stop reason: HOSPADM

## 2021-01-19 RX ORDER — LIDOCAINE HYDROCHLORIDE 20 MG/ML
INJECTION, SOLUTION EPIDURAL; INFILTRATION; INTRACAUDAL; PERINEURAL PRN
Status: DISCONTINUED | OUTPATIENT
Start: 2021-01-19 | End: 2021-01-19 | Stop reason: SDUPTHER

## 2021-01-19 RX ORDER — SODIUM CHLORIDE 0.9 % (FLUSH) 0.9 %
10 SYRINGE (ML) INJECTION EVERY 12 HOURS SCHEDULED
Status: DISCONTINUED | OUTPATIENT
Start: 2021-01-19 | End: 2021-01-19 | Stop reason: HOSPADM

## 2021-01-19 RX ORDER — LABETALOL HYDROCHLORIDE 5 MG/ML
5 INJECTION, SOLUTION INTRAVENOUS EVERY 10 MIN PRN
Status: DISCONTINUED | OUTPATIENT
Start: 2021-01-19 | End: 2021-01-19 | Stop reason: HOSPADM

## 2021-01-19 RX ORDER — PHENYLEPHRINE HYDROCHLORIDE 10 MG/ML
INJECTION INTRAVENOUS PRN
Status: DISCONTINUED | OUTPATIENT
Start: 2021-01-19 | End: 2021-01-19 | Stop reason: SDUPTHER

## 2021-01-19 RX ORDER — FENTANYL CITRATE 50 UG/ML
25 INJECTION, SOLUTION INTRAMUSCULAR; INTRAVENOUS EVERY 5 MIN PRN
Status: DISCONTINUED | OUTPATIENT
Start: 2021-01-19 | End: 2021-01-19 | Stop reason: HOSPADM

## 2021-01-19 RX ORDER — ACETAMINOPHEN 325 MG/1
650 TABLET ORAL EVERY 4 HOURS PRN
Status: DISCONTINUED | OUTPATIENT
Start: 2021-01-19 | End: 2021-01-20 | Stop reason: HOSPADM

## 2021-01-19 RX ORDER — LIDOCAINE HYDROCHLORIDE 10 MG/ML
1 INJECTION, SOLUTION EPIDURAL; INFILTRATION; INTRACAUDAL; PERINEURAL
Status: DISCONTINUED | OUTPATIENT
Start: 2021-01-19 | End: 2021-01-19 | Stop reason: HOSPADM

## 2021-01-19 RX ORDER — PROMETHAZINE HYDROCHLORIDE 25 MG/ML
6.25 INJECTION, SOLUTION INTRAMUSCULAR; INTRAVENOUS
Status: DISCONTINUED | OUTPATIENT
Start: 2021-01-19 | End: 2021-01-19 | Stop reason: HOSPADM

## 2021-01-19 RX ORDER — HYDROMORPHONE HCL 110MG/55ML
0.5 PATIENT CONTROLLED ANALGESIA SYRINGE INTRAVENOUS EVERY 5 MIN PRN
Status: DISCONTINUED | OUTPATIENT
Start: 2021-01-19 | End: 2021-01-19 | Stop reason: HOSPADM

## 2021-01-19 RX ORDER — SODIUM CHLORIDE 0.9 % (FLUSH) 0.9 %
10 SYRINGE (ML) INJECTION PRN
Status: DISCONTINUED | OUTPATIENT
Start: 2021-01-19 | End: 2021-01-20 | Stop reason: HOSPADM

## 2021-01-19 RX ORDER — EPHEDRINE SULFATE/0.9% NACL/PF 50 MG/5 ML
SYRINGE (ML) INTRAVENOUS PRN
Status: DISCONTINUED | OUTPATIENT
Start: 2021-01-19 | End: 2021-01-19 | Stop reason: SDUPTHER

## 2021-01-19 RX ORDER — ROCURONIUM BROMIDE 10 MG/ML
INJECTION, SOLUTION INTRAVENOUS PRN
Status: DISCONTINUED | OUTPATIENT
Start: 2021-01-19 | End: 2021-01-19 | Stop reason: SDUPTHER

## 2021-01-19 RX ORDER — POTASSIUM CHLORIDE 7.45 MG/ML
10 INJECTION INTRAVENOUS PRN
Status: DISCONTINUED | OUTPATIENT
Start: 2021-01-19 | End: 2021-01-20 | Stop reason: HOSPADM

## 2021-01-19 RX ORDER — ONDANSETRON 2 MG/ML
INJECTION INTRAMUSCULAR; INTRAVENOUS PRN
Status: DISCONTINUED | OUTPATIENT
Start: 2021-01-19 | End: 2021-01-19 | Stop reason: SDUPTHER

## 2021-01-19 RX ORDER — POTASSIUM CHLORIDE 20 MEQ/1
40 TABLET, EXTENDED RELEASE ORAL PRN
Status: DISCONTINUED | OUTPATIENT
Start: 2021-01-19 | End: 2021-01-20 | Stop reason: HOSPADM

## 2021-01-19 RX ORDER — PROPOFOL 10 MG/ML
INJECTION, EMULSION INTRAVENOUS PRN
Status: DISCONTINUED | OUTPATIENT
Start: 2021-01-19 | End: 2021-01-19 | Stop reason: SDUPTHER

## 2021-01-19 RX ORDER — MAGNESIUM SULFATE 1 G/100ML
1 INJECTION INTRAVENOUS PRN
Status: DISCONTINUED | OUTPATIENT
Start: 2021-01-19 | End: 2021-01-20 | Stop reason: HOSPADM

## 2021-01-19 RX ORDER — SODIUM CHLORIDE 0.9 % (FLUSH) 0.9 %
10 SYRINGE (ML) INJECTION PRN
Status: DISCONTINUED | OUTPATIENT
Start: 2021-01-19 | End: 2021-01-19 | Stop reason: HOSPADM

## 2021-01-19 RX ORDER — MIDAZOLAM HYDROCHLORIDE 1 MG/ML
INJECTION INTRAMUSCULAR; INTRAVENOUS PRN
Status: DISCONTINUED | OUTPATIENT
Start: 2021-01-19 | End: 2021-01-19 | Stop reason: SDUPTHER

## 2021-01-19 RX ORDER — CLINDAMYCIN PHOSPHATE 900 MG/50ML
900 INJECTION INTRAVENOUS ONCE
Status: COMPLETED | OUTPATIENT
Start: 2021-01-19 | End: 2021-01-19

## 2021-01-19 RX ORDER — MORPHINE SULFATE 2 MG/ML
2 INJECTION, SOLUTION INTRAMUSCULAR; INTRAVENOUS
Status: DISCONTINUED | OUTPATIENT
Start: 2021-01-19 | End: 2021-01-20 | Stop reason: HOSPADM

## 2021-01-19 RX ORDER — OXYCODONE HYDROCHLORIDE AND ACETAMINOPHEN 5; 325 MG/1; MG/1
2 TABLET ORAL EVERY 4 HOURS PRN
Status: DISCONTINUED | OUTPATIENT
Start: 2021-01-19 | End: 2021-01-20 | Stop reason: HOSPADM

## 2021-01-19 RX ORDER — TAMSULOSIN HYDROCHLORIDE 0.4 MG/1
0.4 CAPSULE ORAL DAILY
Status: DISCONTINUED | OUTPATIENT
Start: 2021-01-19 | End: 2021-01-20 | Stop reason: HOSPADM

## 2021-01-19 RX ORDER — OXYCODONE HYDROCHLORIDE AND ACETAMINOPHEN 5; 325 MG/1; MG/1
1 TABLET ORAL PRN
Status: DISCONTINUED | OUTPATIENT
Start: 2021-01-19 | End: 2021-01-19 | Stop reason: HOSPADM

## 2021-01-19 RX ORDER — ONDANSETRON 2 MG/ML
4 INJECTION INTRAMUSCULAR; INTRAVENOUS
Status: DISCONTINUED | OUTPATIENT
Start: 2021-01-19 | End: 2021-01-19 | Stop reason: HOSPADM

## 2021-01-19 RX ORDER — SODIUM CHLORIDE 9 MG/ML
INJECTION, SOLUTION INTRAVENOUS CONTINUOUS
Status: DISCONTINUED | OUTPATIENT
Start: 2021-01-19 | End: 2021-01-19

## 2021-01-19 RX ORDER — OXYCODONE HYDROCHLORIDE AND ACETAMINOPHEN 5; 325 MG/1; MG/1
2 TABLET ORAL PRN
Status: DISCONTINUED | OUTPATIENT
Start: 2021-01-19 | End: 2021-01-19 | Stop reason: HOSPADM

## 2021-01-19 RX ORDER — FENTANYL CITRATE 50 UG/ML
INJECTION, SOLUTION INTRAMUSCULAR; INTRAVENOUS PRN
Status: DISCONTINUED | OUTPATIENT
Start: 2021-01-19 | End: 2021-01-19 | Stop reason: SDUPTHER

## 2021-01-19 RX ORDER — DEXAMETHASONE SODIUM PHOSPHATE 10 MG/ML
INJECTION, SOLUTION INTRAMUSCULAR; INTRAVENOUS PRN
Status: DISCONTINUED | OUTPATIENT
Start: 2021-01-19 | End: 2021-01-19 | Stop reason: SDUPTHER

## 2021-01-19 RX ORDER — HEPARIN SODIUM 1000 [USP'U]/ML
INJECTION, SOLUTION INTRAVENOUS; SUBCUTANEOUS PRN
Status: DISCONTINUED | OUTPATIENT
Start: 2021-01-19 | End: 2021-01-19 | Stop reason: SDUPTHER

## 2021-01-19 RX ORDER — SODIUM CHLORIDE 0.9 % (FLUSH) 0.9 %
10 SYRINGE (ML) INJECTION EVERY 12 HOURS SCHEDULED
Status: DISCONTINUED | OUTPATIENT
Start: 2021-01-19 | End: 2021-01-20 | Stop reason: HOSPADM

## 2021-01-19 RX ORDER — IODIXANOL 320 MG/ML
INJECTION, SOLUTION INTRAVASCULAR PRN
Status: DISCONTINUED | OUTPATIENT
Start: 2021-01-19 | End: 2021-01-19 | Stop reason: HOSPADM

## 2021-01-19 RX ADMIN — ONDANSETRON 4 MG: 2 INJECTION, SOLUTION INTRAMUSCULAR; INTRAVENOUS at 12:00

## 2021-01-19 RX ADMIN — SODIUM CHLORIDE: 9 INJECTION, SOLUTION INTRAVENOUS at 12:55

## 2021-01-19 RX ADMIN — ROCURONIUM BROMIDE 10 MG: 10 INJECTION, SOLUTION INTRAVENOUS at 10:38

## 2021-01-19 RX ADMIN — SODIUM CHLORIDE, POTASSIUM CHLORIDE, SODIUM LACTATE AND CALCIUM CHLORIDE: 600; 310; 30; 20 INJECTION, SOLUTION INTRAVENOUS at 23:27

## 2021-01-19 RX ADMIN — PHENYLEPHRINE HYDROCHLORIDE 100 MCG: 10 INJECTION INTRAVENOUS at 09:38

## 2021-01-19 RX ADMIN — SODIUM CHLORIDE: 9 INJECTION, SOLUTION INTRAVENOUS at 09:15

## 2021-01-19 RX ADMIN — FENTANYL CITRATE 25 MCG: 50 INJECTION, SOLUTION INTRAMUSCULAR; INTRAVENOUS at 13:14

## 2021-01-19 RX ADMIN — PROPOFOL 150 MG: 10 INJECTION, EMULSION INTRAVENOUS at 09:26

## 2021-01-19 RX ADMIN — PROPOFOL 20 MG: 10 INJECTION, EMULSION INTRAVENOUS at 11:44

## 2021-01-19 RX ADMIN — HEPARIN SODIUM 9000 UNITS: 1000 INJECTION, SOLUTION INTRAVENOUS; SUBCUTANEOUS at 10:33

## 2021-01-19 RX ADMIN — Medication 100 MCG: at 09:26

## 2021-01-19 RX ADMIN — PROPOFOL 30 MG: 10 INJECTION, EMULSION INTRAVENOUS at 10:21

## 2021-01-19 RX ADMIN — PHENYLEPHRINE HYDROCHLORIDE 100 MCG: 10 INJECTION INTRAVENOUS at 10:34

## 2021-01-19 RX ADMIN — PROTAMINE SULFATE 15 MG: 10 INJECTION, SOLUTION INTRAVENOUS at 12:12

## 2021-01-19 RX ADMIN — Medication 5 MG: at 10:11

## 2021-01-19 RX ADMIN — LIDOCAINE HYDROCHLORIDE 100 MG: 20 INJECTION, SOLUTION EPIDURAL; INFILTRATION; INTRACAUDAL; PERINEURAL at 09:26

## 2021-01-19 RX ADMIN — TAMSULOSIN HYDROCHLORIDE 0.4 MG: 0.4 CAPSULE ORAL at 20:26

## 2021-01-19 RX ADMIN — Medication 5 MG: at 10:29

## 2021-01-19 RX ADMIN — FENTANYL CITRATE 25 MCG: 50 INJECTION, SOLUTION INTRAMUSCULAR; INTRAVENOUS at 13:33

## 2021-01-19 RX ADMIN — DEXAMETHASONE SODIUM PHOSPHATE 4 MG: 10 INJECTION INTRAMUSCULAR; INTRAVENOUS at 10:23

## 2021-01-19 RX ADMIN — OXYCODONE AND ACETAMINOPHEN 2 TABLET: 5; 325 TABLET ORAL at 20:26

## 2021-01-19 RX ADMIN — PHENYLEPHRINE HYDROCHLORIDE 100 MCG: 10 INJECTION INTRAVENOUS at 10:32

## 2021-01-19 RX ADMIN — ASPIRIN 81 MG: 81 TABLET, COATED ORAL at 14:52

## 2021-01-19 RX ADMIN — SODIUM CHLORIDE, POTASSIUM CHLORIDE, SODIUM LACTATE AND CALCIUM CHLORIDE: 600; 310; 30; 20 INJECTION, SOLUTION INTRAVENOUS at 14:10

## 2021-01-19 RX ADMIN — CEFAZOLIN 2 G: 10 INJECTION, POWDER, FOR SOLUTION INTRAVENOUS at 22:18

## 2021-01-19 RX ADMIN — ROCURONIUM BROMIDE 50 MG: 10 INJECTION, SOLUTION INTRAVENOUS at 09:26

## 2021-01-19 RX ADMIN — OXYCODONE AND ACETAMINOPHEN 2 TABLET: 5; 325 TABLET ORAL at 14:52

## 2021-01-19 RX ADMIN — SODIUM CHLORIDE, POTASSIUM CHLORIDE, SODIUM LACTATE AND CALCIUM CHLORIDE: 600; 310; 30; 20 INJECTION, SOLUTION INTRAVENOUS at 07:21

## 2021-01-19 RX ADMIN — Medication 25 MCG: at 10:21

## 2021-01-19 RX ADMIN — Medication 50 MCG: at 11:14

## 2021-01-19 RX ADMIN — Medication 10 ML: at 20:27

## 2021-01-19 RX ADMIN — ROCURONIUM BROMIDE 10 MG: 10 INJECTION, SOLUTION INTRAVENOUS at 11:16

## 2021-01-19 RX ADMIN — MIDAZOLAM 1 MG: 1 INJECTION INTRAMUSCULAR; INTRAVENOUS at 09:19

## 2021-01-19 RX ADMIN — Medication 50 MCG: at 11:57

## 2021-01-19 RX ADMIN — Medication 10 MG: at 10:42

## 2021-01-19 RX ADMIN — PHENYLEPHRINE HYDROCHLORIDE 50 MCG/MIN: 10 INJECTION INTRAVENOUS at 09:40

## 2021-01-19 RX ADMIN — CEFAZOLIN 2 G: 10 INJECTION, POWDER, FOR SOLUTION INTRAVENOUS at 14:54

## 2021-01-19 RX ADMIN — Medication 25 MCG: at 10:27

## 2021-01-19 RX ADMIN — CLINDAMYCIN PHOSPHATE 900 MG: 900 INJECTION, SOLUTION INTRAVENOUS at 09:35

## 2021-01-19 ASSESSMENT — PAIN DESCRIPTION - LOCATION
LOCATION: GROIN

## 2021-01-19 ASSESSMENT — PULMONARY FUNCTION TESTS
PIF_VALUE: 0
PIF_VALUE: 19
PIF_VALUE: 18
PIF_VALUE: 21
PIF_VALUE: 19
PIF_VALUE: 17
PIF_VALUE: 21
PIF_VALUE: 19
PIF_VALUE: 18
PIF_VALUE: 19
PIF_VALUE: 20
PIF_VALUE: 17
PIF_VALUE: 0
PIF_VALUE: 19
PIF_VALUE: 21
PIF_VALUE: 20
PIF_VALUE: 17
PIF_VALUE: 18
PIF_VALUE: 18
PIF_VALUE: 19
PIF_VALUE: 1
PIF_VALUE: 19
PIF_VALUE: 19
PIF_VALUE: 5
PIF_VALUE: 19
PIF_VALUE: 18
PIF_VALUE: 19
PIF_VALUE: 1
PIF_VALUE: 18
PIF_VALUE: 19
PIF_VALUE: 18
PIF_VALUE: 20
PIF_VALUE: 20
PIF_VALUE: 18
PIF_VALUE: 18
PIF_VALUE: 19
PIF_VALUE: 21
PIF_VALUE: 19
PIF_VALUE: 1
PIF_VALUE: 18
PIF_VALUE: 18
PIF_VALUE: 20
PIF_VALUE: 1
PIF_VALUE: 19
PIF_VALUE: 19
PIF_VALUE: 18
PIF_VALUE: 17
PIF_VALUE: 18
PIF_VALUE: 19
PIF_VALUE: 21
PIF_VALUE: 20
PIF_VALUE: 20
PIF_VALUE: 19
PIF_VALUE: 17
PIF_VALUE: 18
PIF_VALUE: 20
PIF_VALUE: 18
PIF_VALUE: 19
PIF_VALUE: 2
PIF_VALUE: 19
PIF_VALUE: 19
PIF_VALUE: 18
PIF_VALUE: 18
PIF_VALUE: 19
PIF_VALUE: 19
PIF_VALUE: 18
PIF_VALUE: 19
PIF_VALUE: 20
PIF_VALUE: 18
PIF_VALUE: 20
PIF_VALUE: 18
PIF_VALUE: 17
PIF_VALUE: 20
PIF_VALUE: 19
PIF_VALUE: 18
PIF_VALUE: 18
PIF_VALUE: 19
PIF_VALUE: 21
PIF_VALUE: 19
PIF_VALUE: 19
PIF_VALUE: 18
PIF_VALUE: 2
PIF_VALUE: 18
PIF_VALUE: 19
PIF_VALUE: 19
PIF_VALUE: 18
PIF_VALUE: 19
PIF_VALUE: 19
PIF_VALUE: 18
PIF_VALUE: 19

## 2021-01-19 ASSESSMENT — PAIN - FUNCTIONAL ASSESSMENT
PAIN_FUNCTIONAL_ASSESSMENT: ACTIVITIES ARE NOT PREVENTED
PAIN_FUNCTIONAL_ASSESSMENT: 0-10

## 2021-01-19 ASSESSMENT — PAIN DESCRIPTION - ONSET
ONSET: GRADUAL
ONSET: GRADUAL
ONSET: ON-GOING

## 2021-01-19 ASSESSMENT — PAIN DESCRIPTION - PAIN TYPE
TYPE: SURGICAL PAIN

## 2021-01-19 ASSESSMENT — PAIN DESCRIPTION - ORIENTATION
ORIENTATION: LEFT;RIGHT
ORIENTATION: LEFT;RIGHT
ORIENTATION: RIGHT;LEFT
ORIENTATION: RIGHT;LEFT
ORIENTATION: LEFT;RIGHT
ORIENTATION: LEFT;RIGHT

## 2021-01-19 ASSESSMENT — PAIN DESCRIPTION - FREQUENCY
FREQUENCY: CONTINUOUS
FREQUENCY: INTERMITTENT
FREQUENCY: CONTINUOUS

## 2021-01-19 ASSESSMENT — PAIN DESCRIPTION - PROGRESSION
CLINICAL_PROGRESSION: GRADUALLY IMPROVING
CLINICAL_PROGRESSION: GRADUALLY IMPROVING
CLINICAL_PROGRESSION: NOT CHANGED

## 2021-01-19 ASSESSMENT — PAIN SCALES - GENERAL
PAINLEVEL_OUTOF10: 3
PAINLEVEL_OUTOF10: 5
PAINLEVEL_OUTOF10: 8
PAINLEVEL_OUTOF10: 3
PAINLEVEL_OUTOF10: 8

## 2021-01-19 ASSESSMENT — PAIN DESCRIPTION - DESCRIPTORS
DESCRIPTORS: SORE
DESCRIPTORS: DISCOMFORT

## 2021-01-19 NOTE — PROGRESS NOTES
Patient sat up to 30 degrees to eat dinner. Will continue to monitor. NO bruising, No swelling, No hematoma, dressings remain intact and no shadow drainage noted. Patient denies any nausea at this time.

## 2021-01-19 NOTE — H&P
History and Physical Service   [facility] 12    HISTORY AND PHYSICAL EXAMINATION            Date of Evaluation: 1/19/2021  Patient name:  Darrell Jacques  MRN:   [id number]  YOB: 1940  PCP:    Rolando Kapadia MD    History Obtained From:     Patient    History of Present Illness: This is Darrell Jacques a 78 y.o. male who presents today for a endovascular abdominal aortic aneurysm repair by Dr. Melania Valdez for abdominal aortic aneurysm. HX AAA monitored by Vascular The patient's recent imaging on 10/20/20 showed \"a 4.8cm by 4.5cm sacular infra renal abdominal aortic aneurysm without rupture\" per Dr Lois Ryan's notes or 12/2/20. Patient doesn't experience abdominal pain. He was recommended to have surgical repair. Hx COPD and KELLY using CPAP nightly  Patient follows with Cardiologist, Dr Sarika Burton and was evaluated 11/18/20 for Hx A Fib but has remained in NSR. for extended period and not on anticoagulants except ASA according to his notes  He was cleared for surgery with a 7% cardiac risk 12/2/20 (hardcopy in short chart)  Denies fevers, chills, chest pain, dyspnea, rashes, open sores, and wounds. +DM controlled with diet  Patient  Hx MSSA.   Patient took antibiotics for a week prescribed by Dr Declan Goff due to xray results 1/4/21      Past Medical History:     Past Medical History:   Diagnosis Date    A-fib [facility])     AAA (abdominal aortic aneurysm) without rupture [address] [address] 9/20/2017    Acute cystitis without hematuria 9/23/2017    Acute osteomyelitis of thoracic spine ([facility] [address] 11/8/2017    Arthritis     Left knee     Atrial fibrillation (HCC) 9/23/2017    Chronic back pain     and knee pain/ sees chiropractor    Class 2 obesity due to excess calories with serious comorbidity in adult     COPD (chronic obstructive pulmonary disease) (HCC)     CPAP (continuous positive airway pressure) dependence     DDD (degenerative disc disease), thoracolumbar 9/21/2017    Decubitus ulcer buttock, stage 3    Decubitus ulcer of left buttock, stage 3 (HCC)     Decubitus ulcer of left buttock, stage 4 (HCC) 3/22/2018    Decubitus ulcer of sacral region, stage 3 ([address] 11/12/2017    Degenerative spondylolisthesis 9/21/2017    Diabetes mellitus ([address]     diet controlled    Elevated PSA 9/21/2017    [place] (hard of hearing)     bilateral,wears hearing aides    Hyperlipidemia     Iron deficiency anemia due to chronic blood loss     Knee effusion, left 9/21/2017    Lumbar stenosis     L4-L5    Lumbar stenosis with neurogenic claudication 9/21/2017    MSSA (methicillin susceptible Staphylococcus aureus)     Osteomyelitis ([facility])     Thoracic spine    Osteomyelitis of thoracic region ([address]     Prediabetes 9/24/2017    Primary osteoarthritis of left knee 9/21/2017    Prostate enlargement     Severe malnutrition ([address] 9/24/2017    Sleep apnea     wears c-pap    Sleep apnea     Staphylococcal septicemia ([address]     Thoracic back pain     T4-T5. Chronic     Thoracic discitis 11/8/2017    Thrombocytopenia ([address] 9/23/2017        Past Surgical History:     Past Surgical History:   Procedure Laterality Date    APPENDECTOMY      KNEE ARTHROSCOPY Left 09/22/2017    LEFT KNEE ARTHROSCOPIC LAVAGE, synovectomy; insertion drain    PILONIDAL CYST EXCISION      DE KNEE SCOPE,DIAGNOSTIC Left 9/22/2017    LEFT KNEE ARTHROSCOPIC LAVAGE performed by Payton Plata MD at [address] [place] [address] SEPTOPLASTY          Medications Prior to Admission:     Prior to Admission medications    Medication Sig Start Date End Date Taking?  Authorizing Provider   aspirin 81 MG tablet Take 81 mg by mouth daily    Historical Provider, MD   Pediatric Multiple Vit-C-FA (FRUITY CHEWABLES MULTIVITAMIN PO) Take by mouth    Historical Provider, MD   tamsulosin (FLOMAX) 0.4 MG capsule Take 0.4 mg by mouth daily    Historical Provider, MD        Allergies:     Latex, Ibuprofen, Penicillins, and Cephalosporins    Social History: Tobacco:    reports that he has quit smoking. He has never used smokeless tobacco.  Alcohol:      reports current alcohol use. Drug Use:  reports no history of drug use. Family History:     History reviewed. No pertinent family history. Review of Systems:     Positive and Negative as described in HPI. CONSTITUTIONAL:  Negative for fevers, chills, sweats, fatigue, and weight loss. HEENT: [employer] has hearing aid that are at home   Negative for glasses, hearing changes, rhinorrhea, and throat pain. RESPIRATORY: Hx KELLY with CPAP wears nightly Hx COPD  Negative for shortness of breath, cough, congestion, and wheezing. CARDIOVASCULAR: Hx A Fib in NSR with medication Follows with Dr Jarek Parada  Negative for chest pain, blood clot, irregular heartbeat, and palpitations. GASTROINTESTINAL:  Negative for reflux, nausea, vomiting, diarrhea, constipation, change in bowel habits, and abdominal pain. GENITOURINARY: enlarged prostate on Flomax  Negative for difficulty of urination, burning with urination, and frequency. INTEGUMENT: allergic to Latex Negative for rash, skin lesions, and easy bruising. HEMATOLOGIC/LYMPHATIC:  Negative for swelling/edema. ALLERGIC/IMMUNOLOGIC:  Negative for urticaria and itching. ENDOCRINE: Hx DM xontrolled with diet  Negative for increase in drinking, increase in urination, and heat or cold intolerance. MUSCULOSKELETAL: lower back and knee  arthralgia Negative joint pains, muscle aches, and swelling of joints. NEUROLOGICAL:  Negative for headaches, dizziness, lightheadedness, numbness, and tingling extremities. BEHAVIOR/PSYCH:  Negative for depression and anxiety. Physical Exam:   BP (!) 143/67   Pulse 67   Temp 95.7 °F (35.4 °C)   Resp 18   Ht 6' 1\" (1.854 m)   Wt 238 lb (108 kg)   SpO2 95%   BMI 31.40 kg/m²  No results for input(s): POCGLU in the last 72 hours. General Appearance:  Alert, well appearing, and in no acute distress. Mild prominence of left basilar markings, findings suggest focal infiltrate as pneumonia, follow changes to resolution RECOMMENDATION: Exam results were called to the patient's licensed caregiver         Diagnosis:      1. Abdominal aortic aneurysm    Plans:     1.  Endovascular abdominal aortic aneurysm repair      FARZANEH Castillo CNP  1/19/2021  7:22 AM

## 2021-01-19 NOTE — ANESTHESIA POSTPROCEDURE EVALUATION
Department of Anesthesiology  Postprocedure Note    Patient: Allison Vargas  MRN: [id number]  YOB: 1940  Date of evaluation: 1/19/2021  Time:  5:20 PM     Procedure Summary     Date: 01/19/21 Room / Location: [facility] CATH [facility] / [place] [address]    Anesthesia Start: 7843 Anesthesia Stop: 4493    Procedure: ABDOMINAL AORTIC ANEURYSM REPAIR ENDOVASCULAR (N/A Abdomen) Diagnosis: (DX AAA)    Surgeons: Gibson Rudd MD Responsible Provider: Freedom Quinn DO    Anesthesia Type: general ASA Status: 3          Anesthesia Type: general    Mehrdad Phase I: Mehrdad Score: 9    Mehrdad Phase II:      Last vitals: Reviewed and per EMR flowsheets.        Anesthesia Post Evaluation    Patient location during evaluation: PACU  Patient participation: complete - patient participated  Level of consciousness: awake and alert  Airway patency: patent  Nausea & Vomiting: no nausea and no vomiting  Complications: no  Cardiovascular status: hemodynamically stable  Respiratory status: acceptable  Hydration status: stable

## 2021-01-19 NOTE — VIRTUAL HEALTH
Internal Medicine Consult      CHIEF COMPLAINT: Abdominal aortic aneurysm repair  History of Present Illness: Doing fairly well denies any abdominal pain no chest pain no palpitation no dizziness no fever no chills no cough no shortness of breath no wheezing no tachypnea        Past Medical History:   Diagnosis Date    [facility]-fib [facility])     AAA (abdominal aortic aneurysm) without rupture ([address] 9/20/2017    Acute cystitis without hematuria 9/23/2017    Acute osteomyelitis of thoracic spine ([address] 11/8/2017    Arthritis     Left knee     Atrial fibrillation (HCC) 9/23/2017    Chronic back pain     and knee pain/ sees chiropractor    Class 2 obesity due to excess calories with serious comorbidity in adult     COPD (chronic obstructive pulmonary disease) (HCC)     CPAP (continuous positive airway pressure) dependence     DDD (degenerative disc disease), thoracolumbar 9/21/2017    Decubitus ulcer     buttock, stage 3    Decubitus ulcer of left buttock, stage 3 ([address]     Decubitus ulcer of left buttock, stage 4 ([address] 3/22/2018    Decubitus ulcer of sacral region, stage 3 ([address] 11/12/2017    Degenerative spondylolisthesis 9/21/2017    Diabetes mellitus (HCC)     diet controlled    Elevated PSA 9/21/2017    [place] (hard of hearing)     bilateral,wears hearing aides    Hyperlipidemia     Iron deficiency anemia due to chronic blood loss     Knee effusion, left 9/21/2017    Lumbar stenosis     L4-L5    Lumbar stenosis with neurogenic claudication 9/21/2017    MSSA (methicillin susceptible Staphylococcus aureus)     Osteomyelitis (HCC)     Thoracic spine    Osteomyelitis of thoracic region ([address]     Prediabetes 9/24/2017    Primary osteoarthritis of left knee 9/21/2017    Prostate enlargement     Severe malnutrition ([address] 9/24/2017    Sleep apnea     wears c-pap    Sleep apnea     Staphylococcal septicemia (HCC)     Thoracic back pain     T4-T5.  Chronic     Thoracic discitis 11/8/2017  Thrombocytopenia ([facility] [address] 9/23/2017         Past Surgical History:   Procedure Laterality Date    ABDOMINAL AORTIC ANEURYSM REPAIR, ENDOVASCULAR N/A 1/19/2021    ABDOMINAL AORTIC ANEURYSM REPAIR ENDOVASCULAR performed by Yesica Spear MD at [facility] ARTHROSCOPY Left 09/22/2017    LEFT KNEE ARTHROSCOPIC LAVAGE, synovectomy; insertion drain    PILONIDAL CYST EXCISION      MS KNEE SCOPE,DIAGNOSTIC Left 9/22/2017    LEFT KNEE ARTHROSCOPIC LAVAGE performed by Marlo Mcnally MD at [address] [place] [address] SEPTOPLASTY         Medications Prior to Admission:    Medications Prior to Admission: aspirin 81 MG tablet, Take 81 mg by mouth daily  Pediatric Multiple Vit-C-FA (FRUITY CHEWABLES MULTIVITAMIN PO), Take by mouth  tamsulosin (FLOMAX) 0.4 MG capsule, Take 0.4 mg by mouth daily    Allergies:    Latex, Ibuprofen, Penicillins, and Cephalosporins    Social History:    reports that he has quit smoking. He has never used smokeless tobacco. He reports current alcohol use. He reports that he does not use drugs. Family History:   family history is not on file.     REVIEW OF SYSTEMS:  No Family history of coronary artery disease diabetes or cancer  Constitutional: negative, Fever no chills  Eyes: negative  Ears, nose, mouth, throat, and face: negative  Respiratory: negative, No cough no tachypnea no dyspnea no wheezing  Cardiovascular: negative, No chest pain no palpitation no dizziness  Gastrointestinal: negative  Genitourinary:negative  Integument/breast: negative  Hematologic/lymphatic: negative  Musculoskeletal:negative  Neurological: negative, No TIA no seizures no headache  Behavioral/Psych: negative  Endocrine: negative, No polyuria no polydipsia no hypoglycemia  Allergic/Immunologic: negative  PHYSICAL EXAM:  General Appearance: alert and oriented to person, place and time and in no acute distress  Skin: warm and dry, no rash or erythema  Head: normocephalic and atraumatic Eyes: pupils equal, round, and reactive to light, conjunctivae normal and sclera anicteric    Neck: neck supple and non tender without mass   Pulmonary/Chest: clear to auscultation bilaterally- no wheezes, rales or rhonchi, normal air movement, no respiratory distress  Cardiovascular: normal rate, regular rhythm, normal S1 and S2, no gallops, intact distal pulses and no carotid bruits  Abdomen: soft, non-tender, non-distended, normal bowel sounds, no masses or organomegaly  Extremities: no edema and good pulses no Homans' signNeurologic: Alert oriented x3 with no focal deficit  Vitals:  BP (!) 143/63   Pulse 65   Temp 97.5 °F (36.4 °C) (Temporal)   Resp 15   Ht 6' 1\" (1.854 m)   Wt 245 lb 14.4 oz (111.5 kg)   SpO2 98%   BMI 32.44 kg/m²     LABS:  CBC:   Lab Results   Component Value Date    WBC 7.3 01/19/2021    RBC 4.55 01/19/2021    HGB 13.8 01/19/2021    HCT 41.8 01/19/2021    MCV 91.9 01/19/2021    MCH 30.3 01/19/2021    MCHC 33.0 01/19/2021    RDW 13.9 01/19/2021     01/19/2021    MPV 10.9 01/19/2021     CMP:    Lab Results   Component Value Date     01/04/2021    K 4.1 01/04/2021     01/04/2021    CO2 23 01/04/2021    BUN 19 01/04/2021    CREATININE 0.87 01/04/2021    GFRAA >60 01/04/2021    LABGLOM >60 01/04/2021    GLUCOSE 93 01/04/2021    PROT 7.5 01/04/2021    LABALBU 3.7 01/04/2021    CALCIUM 9.3 01/04/2021    BILITOT 0.52 01/04/2021    ALKPHOS 85 01/04/2021    AST 15 01/04/2021    ALT 11 01/04/2021     BMP:    Lab Results   Component Value Date     01/04/2021    K 4.1 01/04/2021     01/04/2021    CO2 23 01/04/2021    BUN 19 01/04/2021    LABALBU 3.7 01/04/2021    CREATININE 0.87 01/04/2021    CALCIUM 9.3 01/04/2021    GFRAA >60 01/04/2021    LABGLOM >60 01/04/2021    GLUCOSE 93 01/04/2021         ASSESSMENT:    \Abdominal aortic aneurysm  Prediabetes  Thrombocytopenia  Remote history of A. fib  Patient Active Problem List   Diagnosis  AAA (abdominal aortic aneurysm) without rupture (HCC)    Primary osteoarthritis of left knee    Knee effusion, left    Staphylococcal septicemia (HCC)    Lumbar and sacral osteoarthritis    Lumbar stenosis with neurogenic claudication    Degenerative spondylolisthesis    DDD (degenerative disc disease), thoracolumbar    COPD (chronic obstructive pulmonary disease) (HCC)    Elevated PSA    Staphylococcal arthritis of left knee (HCC)    Atrial fibrillation (HCC)    Acute cystitis without hematuria    Thrombocytopenia (HCC)    Severe malnutrition (HCC)    Prediabetes    Acute osteomyelitis of thoracic spine (HCC)    Thoracic discitis    Osteomyelitis of thoracic region ([address]    Decubitus ulcer of left buttock, stage 3 (HCC)    Iron deficiency anemia    Class 2 obesity due to excess calories with serious comorbidity in adult    Iron deficiency anemia due to chronic blood loss    Decubitus ulcer of sacral region, stage 3 (HCC)    Osteomyelitis (HCC)    Decubitus ulcer of left buttock, stage 4 (HCC)       PLAN:    Meds labs reviewed, endovascular repair of the abdominal aortic aneurysm pain control incentive spirometry EPC cuffs see orders          Bina Cruz MD  6:30 PM  1/19/2021

## 2021-01-19 NOTE — ANESTHESIA PRE PROCEDURE
Department of Anesthesiology  Preprocedure Note       Name:  Brent Coombs   Age:  78 y.o.  :  1940                                          MRN:  [id number]         Date:  2021      Surgeon: Gwen Barrios):  Mikayla Fraser MD    Procedure: Procedure(s):  ABDOMINAL AORTIC ANEURYSM REPAIR ENDOVASCULAR    Medications prior to admission:   Prior to Admission medications    Medication Sig Start Date End Date Taking? Authorizing Provider   aspirin 81 MG tablet Take 81 mg by mouth daily    Historical Provider, MD   Pediatric Multiple Vit-C-FA (FRUITY CHEWABLES MULTIVITAMIN PO) Take by mouth    Historical Provider, MD   tamsulosin (FLOMAX) 0.4 MG capsule Take 0.4 mg by mouth daily    Historical Provider, MD       Current medications:    Current Facility-Administered Medications   Medication Dose Route Frequency Provider Last Rate Last Admin    0.9 % sodium chloride infusion   Intravenous Continuous Jose Juan Aparicio MD        lactated ringers infusion   Intravenous Continuous Jose Juan Aparicio  mL/hr at 21 0721 New Bag at 21 0721    sodium chloride flush 0.9 % injection 10 mL  10 mL Intravenous 2 times per day Jose Juan Aparicio MD        sodium chloride flush 0.9 % injection 10 mL  10 mL Intravenous PRN Jose Juan Aparicio MD        lidocaine PF 1 % injection 1 mL  1 mL Intradermal Once PRN Jose Juan Aparicio MD        clindamycin (CLEOCIN) 900 mg in dextrose 5 % 50 mL IVPB  900 mg Intravenous Once Mikayla Fraser MD           Allergies: Allergies   Allergen Reactions    Latex Rash    Ibuprofen      Causes ankle swelling    Penicillins Other (See Comments)     Reaction as a child.     Cephalosporins Rash       Problem List:    Patient Active Problem List   Diagnosis Code    AAA (abdominal aortic aneurysm) without rupture ([facility]) I71.4    Primary osteoarthritis of left knee M17.12    Knee effusion, left M25.462    Staphylococcal septicemia ([address] A41.2    Lumbar and sacral osteoarthritis M47.817  Lumbar stenosis with neurogenic claudication M48.062    Degenerative spondylolisthesis M43.10    DDD (degenerative disc disease), thoracolumbar M51.35    COPD (chronic obstructive pulmonary disease) ([facility]) J44.9    Elevated PSA R97.20    Staphylococcal arthritis of left knee ([facility]) M00.062    Atrial fibrillation ([facility]) I48.91    Acute cystitis without hematuria N30.00    Thrombocytopenia ([facility]) D69.6    Severe malnutrition ([facility]) E43    Prediabetes R73.03    Acute osteomyelitis of thoracic spine ([facility]) M46.24    Thoracic discitis M46.44    Osteomyelitis of thoracic region [facility]) M46.24    Decubitus ulcer of left buttock, stage 3 ([facility]) L89.323    Iron deficiency anemia D50.9    Class 2 obesity due to excess calories with serious comorbidity in adult [id number]    Iron deficiency anemia due to chronic blood loss D50.0    Decubitus ulcer of sacral region, stage 3 ([facility]) L89.153    Osteomyelitis ([facility]) M86.9    Decubitus ulcer of left buttock, stage 4 ([facility]) G37.401       Past Medical History:        Diagnosis Date    A-fib [facility])     AAA (abdominal aortic aneurysm) without rupture ([address] 9/20/2017    Acute cystitis without hematuria 9/23/2017    Acute osteomyelitis of thoracic spine ([facility]) 11/8/2017    Arthritis     Left knee     Atrial fibrillation ([facility]) 9/23/2017    Chronic back pain     and knee pain/ sees chiropractor    Class 2 obesity due to excess calories with serious comorbidity in adult     COPD (chronic obstructive pulmonary disease) ([facility])     CPAP (continuous positive airway pressure) dependence     DDD (degenerative disc disease), thoracolumbar 9/21/2017    Decubitus ulcer     buttock, stage 3    Decubitus ulcer of left buttock, stage 3 (HCC)     Decubitus ulcer of left buttock, stage 4 ([address] 3/22/2018    Decubitus ulcer of sacral region, stage 3 ([address] 11/12/2017    Degenerative spondylolisthesis 9/21/2017    Diabetes mellitus ([facility])     diet controlled    Elevated PSA 9/21/2017  [place] (hard of hearing)     bilateral,wears hearing aides    Hyperlipidemia     Iron deficiency anemia due to chronic blood loss     Knee effusion, left 9/21/2017    Lumbar stenosis     L4-L5    Lumbar stenosis with neurogenic claudication 9/21/2017    MSSA (methicillin susceptible Staphylococcus aureus)     Osteomyelitis (HCC)     Thoracic spine    Osteomyelitis of thoracic region ([address]     Prediabetes 9/24/2017    Primary osteoarthritis of left knee 9/21/2017    Prostate enlargement     Severe malnutrition ([address] 9/24/2017    Sleep apnea     wears c-pap    Sleep apnea     Staphylococcal septicemia ([address]     Thoracic back pain     T4-T5.  Chronic     Thoracic discitis 11/8/2017    Thrombocytopenia ([address] 9/23/2017       Past Surgical History:        Procedure Laterality Date    APPENDECTOMY      KNEE ARTHROSCOPY Left 09/22/2017    LEFT KNEE ARTHROSCOPIC LAVAGE, synovectomy; insertion drain    PILONIDAL CYST EXCISION      FL KNEE SCOPE,DIAGNOSTIC Left 9/22/2017    LEFT KNEE ARTHROSCOPIC LAVAGE performed by Colin Daniels MD at [address] [place] [address] SEPTOPLASTY         Social History:    Social History     Tobacco Use    Smoking status: Former Smoker    Smokeless tobacco: Never Used   Substance Use Topics    Alcohol use: Yes     Comment: rarely                                Counseling given: Not Answered      Vital Signs (Current):   Vitals:    01/19/21 0651 01/19/21 0654   BP: (!) 143/67    Pulse: 67    Resp: 18    Temp: 95.7 °F (35.4 °C)    SpO2: 95%    Weight:  238 lb (108 kg)   Height:  6' 1\" (1.854 m)                                              BP Readings from Last 3 Encounters:   01/19/21 (!) 143/67   01/04/21 (!) 146/74   05/30/19 134/78       NPO Status: Time of last liquid consumption: 1600                        Time of last solid consumption: 1600                        Date of last liquid consumption: 01/18/21                        Date of last solid food consumption: 01/18/21    BMI: Wt Readings from Last 3 Encounters:   01/19/21 238 lb (108 kg)   01/04/21 238 lb (108 kg)   05/30/19 255 lb (115.7 kg)     Body mass index is 31.4 kg/m². CBC:   Lab Results   Component Value Date    WBC 8.2 01/04/2021    RBC 4.71 01/04/2021    HGB 14.6 01/04/2021    HCT 43.0 01/04/2021    MCV 91.3 01/04/2021    RDW 13.8 01/04/2021     01/04/2021       CMP:   Lab Results   Component Value Date     01/04/2021    K 4.1 01/04/2021     01/04/2021    CO2 23 01/04/2021    BUN 19 01/04/2021    CREATININE 0.87 01/04/2021    GFRAA >60 01/04/2021    LABGLOM >60 01/04/2021    GLUCOSE 93 01/04/2021    PROT 7.5 01/04/2021    CALCIUM 9.3 01/04/2021    BILITOT 0.52 01/04/2021    ALKPHOS 85 01/04/2021    AST 15 01/04/2021    ALT 11 01/04/2021       POC Tests: No results for input(s): POCGLU, POCNA, POCK, POCCL, POCBUN, POCHEMO, POCHCT in the last 72 hours.     Coags:   Lab Results   Component Value Date    PROTIME 13.2 01/04/2021    INR 1.0 01/04/2021    APTT 31.9 01/04/2021       HCG (If Applicable): No results found for: PREGTESTUR, PREGSERUM, HCG, HCGQUANT     ABGs: No results found for: PHART, PO2ART, RJG1TFA, JCP5JSS, BEART, V9FMFXHT     Type & Screen (If Applicable):  No results found for: LABABO, LABRH    Drug/Infectious Status (If Applicable):  Lab Results   Component Value Date    HEPCAB NONREACTIVE 09/19/2017       COVID-19 Screening (If Applicable):   Lab Results   Component Value Date    COVID19 Not Detected 01/15/2021         Anesthesia Evaluation  Patient summary reviewed and Nursing notes reviewed no history of anesthetic complications:   Airway: Mallampati: II  TM distance: >3 FB   Neck ROM: full  Mouth opening: > = 3 FB Dental: normal exam         Pulmonary:normal exam    (+) COPD:  sleep apnea: on CPAP,                             Cardiovascular:  Exercise tolerance: no interval change,       (-) past MI, CAD and CABG/stent    ECG reviewed    Rate: normal      Cleared by cardiology Neuro/Psych:      (-) seizures, TIA and CVA           GI/Hepatic/Renal:        (-) GERD       Endo/Other:    (+) Diabetes, . Abdominal:           Vascular:                                        Anesthesia Plan      general     ASA 3       Induction: intravenous. arterial line    Anesthetic plan and risks discussed with patient. Plan discussed with CRNA.     Attending anesthesiologist reviewed and agrees with Pre Eval content              Annel Alarcon DO   1/19/2021

## 2021-01-19 NOTE — ANESTHESIA PROCEDURE NOTES
Arterial Line:    An arterial line was placed using ultrasound guidance, in the OR for the following indication(s): continuous blood pressure monitoring. A 20 gauge (size), 1 and 3/4 inch (length), Arrow (type) catheter was placed, into the left radial artery, secured by Tegaderm and tape. Anesthesia type: General    Events:  patient tolerated procedure well with no complications.   1/19/2021 9:32 AM1/19/2021 9:52 AM  Anesthesiologist: Michelle Richardson DO  Resident/CRNA: FARZANEH Castellanos - CRNA  Other anesthesia staff: Stan Cordoba  Performed: Anesthesiologist   Preanesthetic Checklist  Completed: patient identified, IV checked, site marked, risks and benefits discussed, surgical consent, monitors and equipment checked, pre-op evaluation, timeout performed, anesthesia consent given, oxygen available and patient being monitored

## 2021-01-19 NOTE — FLOWSHEET NOTE
01/19/21 1447   Provider Notification   Reason for Communication Evaluate; Review case   Provider Name Dr. Toledo Pump   Provider Notification Physician   Method of Communication Call   Response No new orders  (Notified of new consult for med mgt. )

## 2021-01-19 NOTE — OP NOTE
made in both groins and subcutaneous tissue was sharply taken down with  bleeding points being controlled with electrocautery. Femoral vessels  were readily identified and circumferentially dissected. At this point,  the patient was intravenously heparinized with 9000 units of aqueous  heparin. Wire access was obtained in both groins and 8-Barbadian sheaths  were advanced bilaterally. A 5-Barbadian SOS Omni Flush catheter was  advanced into the suprarenal aorta and using 50% diluted Visipaque 320  contrast and digital imaging, abdominal aortography was performed. Amplatz Super Stiff guidewire was then advanced on the right side and  the Omni Flush catheter was moved to the left. An Endurant E2S 32 mm x  14 mm x 103 mm main body device was brought into the field and oriented  with the gate at the 2 o'clock position. The device was advanced from  the right side and the image intensifier was angled approximately 18  degrees cranial in order to adjust for parallax. The graft was then  deployed below the lowest left main renal artery sacrificing an  accessory renal on the right. The contralateral gate was then released  followed by the suprarenal fixation pins. The Omni Flush catheter was  brought down and the contralateral gate was cannulated and the catheter  was advanced, which moved freely. Amplatz Super Stiff guidewire was  advanced on the left side. Retrograde contrast angiography was  performed via left sheath injection in the ALBERT projection. An Endurant  16 mm x 13 mm x 156 mm limb was advanced from the left side and deployed  to the left iliac bifurcation. Introducer assembly was withdrawn and a  14-Barbadian sheath was advanced. Remainder of the main body device was  then deployed and the introducer assembly was withdrawn and a 16-Barbadian  sheath placed. Retrograde contrast angiography was performed via right  sheath injection in the Marshallese projection.   An Endurant 16 mm x 13 mm x 146 mm iliac limb was advanced on the right side and deployed to the right  iliac bifurcation. The introducer assembly was withdrawn. Reliant  balloon was then advanced from both sides and the aortic portion of the  graft was ironed out along the both iliac limbs. The graft did appear  somewhat constrained at the aortic bifurcation due to this area being  somewhat narrow. This was not relieved by simultaneous inflations of the  Reliant balloons. A 14 mm x 40 mm Conquest balloon was then advanced on  the left side with a Reliant balloon on the right and the balloon  inflated to 6 atmospheres sequentially. Good wasting was noted and the  balloon was withdrawn. Contrast angiography was performed which did not  demonstrate any significant evidence of compression. Omni Flush  catheter was then replaced and completion angiography was performed  pulling back on both sheaths. This demonstrated excellent flow through  the graft without evidence of type 1 or type 2 Endoleak. Introducer  assemblies were then withdrawn followed by both femoral sheaths. Femoral vessels were clamped proximally and distally. The vessels were  irrigated with heparinized saline and the arteriotomies were closed in a  transverse fashion using interrupted 5-0 Prolene suture. Clamps were  released and flow re-established to both lower extremities. Thrombin  and Gelfoam was placed at the closure sites. 15 mg of protamine was  given intravenously in order to reverse the remaining heparin. The  wounds were then irrigated with Irrisept solution and the Gelfoam  removed. Suture lines were inspected and found to be hemostatic. Further irrigation was carried out and both groins were closed in  multiple layers using running 2-0 and 3-0 Vicryl suture followed by  closure of skin with interrupted 4-0 Monocryl subcuticular suture. Dermabond was applied followed by sterile Kerlix dressing.   The patient tolerated the procedure well and was transferred to the recovery room in  satisfactory condition. Sponge, instrument, and needle counts were  correct at the conclusion of the operation.         Jamison Mccray MD    D: 01/19/2021 12:51:36       T: 01/19/2021 14:20:45     [id number]  Job#: [id number]     Doc#: [id number]    CC:  MD Jacobo Romano

## 2021-01-20 ENCOUNTER — APPOINTMENT (OUTPATIENT)
Dept: GENERAL RADIOLOGY | Age: 81
DRG: 269 | End: 2021-01-20
Attending: SURGERY
Payer: MEDICARE

## 2021-01-20 VITALS
HEIGHT: 73 IN | HEART RATE: 48 BPM | RESPIRATION RATE: 16 BRPM | TEMPERATURE: 98.9 F | WEIGHT: 245.9 LBS | OXYGEN SATURATION: 94 % | BODY MASS INDEX: 32.59 KG/M2 | DIASTOLIC BLOOD PRESSURE: 59 MMHG | SYSTOLIC BLOOD PRESSURE: 112 MMHG

## 2021-01-20 LAB
ABSOLUTE EOS #: <0.03 K/UL (ref 0–0.44)
ABSOLUTE IMMATURE GRANULOCYTE: 0.07 K/UL (ref 0–0.3)
ABSOLUTE LYMPH #: 0.84 K/UL (ref 1.1–3.7)
ABSOLUTE MONO #: 0.82 K/UL (ref 0.1–1.2)
ANION GAP SERPL CALCULATED.3IONS-SCNC: 10 MMOL/L (ref 9–17)
BASOPHILS # BLD: 0 % (ref 0–2)
BASOPHILS ABSOLUTE: <0.03 K/UL (ref 0–0.2)
BUN BLDV-MCNC: 14 MG/DL (ref 8–23)
BUN/CREAT BLD: 15 (ref 9–20)
CALCIUM SERPL-MCNC: 8.3 MG/DL (ref 8.6–10.4)
CHLORIDE BLD-SCNC: 103 MMOL/L (ref 98–107)
CO2: 23 MMOL/L (ref 20–31)
CREAT SERPL-MCNC: 0.92 MG/DL (ref 0.7–1.2)
DIFFERENTIAL TYPE: ABNORMAL
EOSINOPHILS RELATIVE PERCENT: 0 % (ref 1–4)
GFR AFRICAN AMERICAN: >60 ML/MIN
GFR NON-AFRICAN AMERICAN: >60 ML/MIN
GFR SERPL CREATININE-BSD FRML MDRD: ABNORMAL ML/MIN/{1.73_M2}
GFR SERPL CREATININE-BSD FRML MDRD: ABNORMAL ML/MIN/{1.73_M2}
GLUCOSE BLD-MCNC: 137 MG/DL (ref 70–99)
HCT VFR BLD CALC: 38.1 % (ref 40.7–50.3)
HEMOGLOBIN: 12.3 G/DL (ref 13–17)
IMMATURE GRANULOCYTES: 1 %
LYMPHOCYTES # BLD: 8 % (ref 24–43)
MAGNESIUM: 2.1 MG/DL (ref 1.6–2.6)
MCH RBC QN AUTO: 30.1 PG (ref 25.2–33.5)
MCHC RBC AUTO-ENTMCNC: 32.3 G/DL (ref 28.4–34.8)
MCV RBC AUTO: 93.2 FL (ref 82.6–102.9)
MONOCYTES # BLD: 8 % (ref 3–12)
NRBC AUTOMATED: 0 PER 100 WBC
PDW BLD-RTO: 13.8 % (ref 11.8–14.4)
PHOSPHORUS: 2.9 MG/DL (ref 2.5–4.5)
PLATELET # BLD: 108 K/UL (ref 138–453)
PLATELET ESTIMATE: ABNORMAL
PMV BLD AUTO: 11.2 FL (ref 8.1–13.5)
POTASSIUM SERPL-SCNC: 4.6 MMOL/L (ref 3.7–5.3)
RBC # BLD: 4.09 M/UL (ref 4.21–5.77)
RBC # BLD: ABNORMAL 10*6/UL
SEG NEUTROPHILS: 82 % (ref 36–65)
SEGMENTED NEUTROPHILS ABSOLUTE COUNT: 8.2 K/UL (ref 1.5–8.1)
SODIUM BLD-SCNC: 136 MMOL/L (ref 135–144)
WBC # BLD: 10 K/UL (ref 3.5–11.3)
WBC # BLD: ABNORMAL 10*3/UL

## 2021-01-20 PROCEDURE — 74019 RADEX ABDOMEN 2 VIEWS: CPT

## 2021-01-20 PROCEDURE — 80048 BASIC METABOLIC PNL TOTAL CA: CPT

## 2021-01-20 PROCEDURE — 83735 ASSAY OF MAGNESIUM: CPT

## 2021-01-20 PROCEDURE — 85025 COMPLETE CBC W/AUTO DIFF WBC: CPT

## 2021-01-20 PROCEDURE — 2580000003 HC RX 258: Performed by: SURGERY

## 2021-01-20 PROCEDURE — 36415 COLL VENOUS BLD VENIPUNCTURE: CPT

## 2021-01-20 PROCEDURE — 84100 ASSAY OF PHOSPHORUS: CPT

## 2021-01-20 PROCEDURE — 6370000000 HC RX 637 (ALT 250 FOR IP): Performed by: SURGERY

## 2021-01-20 RX ORDER — HYDROCODONE BITARTRATE AND ACETAMINOPHEN 5; 325 MG/1; MG/1
1 TABLET ORAL EVERY 6 HOURS PRN
Qty: 28 TABLET | Refills: 0 | Status: SHIPPED | OUTPATIENT
Start: 2021-01-20 | End: 2021-01-27

## 2021-01-20 RX ORDER — ASPIRIN 325 MG
325 TABLET, DELAYED RELEASE (ENTERIC COATED) ORAL DAILY
Qty: 30 TABLET | Refills: 3 | Status: SHIPPED | OUTPATIENT
Start: 2021-01-20

## 2021-01-20 RX ADMIN — OXYCODONE AND ACETAMINOPHEN 2 TABLET: 5; 325 TABLET ORAL at 04:33

## 2021-01-20 RX ADMIN — Medication 10 ML: at 08:24

## 2021-01-20 RX ADMIN — ASPIRIN 81 MG: 81 TABLET, COATED ORAL at 08:25

## 2021-01-20 ASSESSMENT — PAIN DESCRIPTION - PROGRESSION
CLINICAL_PROGRESSION: NOT CHANGED
CLINICAL_PROGRESSION: GRADUALLY IMPROVING
CLINICAL_PROGRESSION: NOT CHANGED

## 2021-01-20 ASSESSMENT — PAIN SCALES - GENERAL: PAINLEVEL_OUTOF10: 8

## 2021-01-20 NOTE — PLAN OF CARE
Problem: Skin Integrity:  Goal: Will show no infection signs and symptoms  Description: Will show no infection signs and symptoms  1/19/2021 1927 by Kate Buckner RN  Outcome: Met This Shift  1/19/2021 1927 by Kate Buckner RN  Outcome: Met This Shift  Goal: Absence of new skin breakdown  Description: Absence of new skin breakdown  1/19/2021 1927 by Kate Buckner RN  Outcome: Met This Shift  1/19/2021 1927 by Kate Buckner RN  Outcome: Met This Shift     Problem: Bleeding:  Goal: Will show no signs and symptoms of excessive bleeding  Description: Will show no signs and symptoms of excessive bleeding  Outcome: Met This Shift

## 2021-01-20 NOTE — PROGRESS NOTES
MD phoned to notify him of abd XR result. Patient reports he has passed gas. No new orders. OK to discharge.

## 2021-01-20 NOTE — PROGRESS NOTES
Patient given discharge instructions verbal and written including new medications and side effects information reviewed. Norco and ASA Rx also included with discharge paperwork. Patient verbalizes understanding. Patient discharged with all personal belongings with family via wheelchair to home. See discharge event for time of discharge.

## 2021-01-20 NOTE — PLAN OF CARE
Problem: Skin Integrity:  Goal: Will show no infection signs and symptoms  Description: Will show no infection signs and symptoms  1/19/2021 1927 by Sally Taylor RN  Outcome: Met This Shift  1/19/2021 1927 by Sally Taylor RN  Outcome: Met This Shift  Goal: Absence of new skin breakdown  Description: Absence of new skin breakdown  1/20/2021 0441 by Mariah Peoples RN  Outcome: Met This Shift  1/19/2021 1927 by Sally Taylor RN  Outcome: Met This Shift  1/19/2021 1927 by Sally Taylor RN  Outcome: Met This Shift

## 2021-01-20 NOTE — PROGRESS NOTES
VASCULAR SURGERY  PROGRESS NOTE  POST-OP EVAR    1/20/2021  11:13 AM     Ashok José    1940   [id number]        SUBJECTIVE:  Patient awake and alert. No complaints. Good pain control. Denies nausea. OBJECTIVE    Physical  VITALS:  BP (!) 112/59   Pulse (!) 48   Temp 98.9 °F (37.2 °C) (Temporal)   Resp 16   Ht 6' 1\" (1.854 m)   Wt 245 lb 14.4 oz (111.5 kg)   SpO2 94%   BMI 32.44 kg/m²     CONSTITUTIONAL:  awake, alert, cooperative, no apparent distress and appears stated age  ABDOMEN: soft, non-tender, non-distended, incision sites C+D  EXTREMITIES: warm, no ischemia or edema  NEUROLOGIC:  Mental status unchanged. Motor and sensory function intact. Data  Hemoglobin   Date/Time Value Ref Range Status   01/20/2021 04:53 AM 12.3 (L) 13.0 - 17.0 g/dL Final     Hematocrit   Date/Time Value Ref Range Status   01/20/2021 04:53 AM 38.1 (L) 40.7 - 50.3 % Final     Sodium   Date/Time Value Ref Range Status   01/20/2021 04:53  135 - 144 mmol/L Final     Potassium   Date/Time Value Ref Range Status   01/20/2021 04:53 AM 4.6 3.7 - 5.3 mmol/L Final     Chloride   Date/Time Value Ref Range Status   01/20/2021 04:53  98 - 107 mmol/L Final     CO2   Date/Time Value Ref Range Status   01/20/2021 04:53 AM 23 20 - 31 mmol/L Final     BUN   Date/Time Value Ref Range Status   01/20/2021 04:53 AM 14 8 - 23 mg/dL Final       ASSESSMENT AND PLAN    78 y.o. male doing well status post EVAR       Plan home today. Follow-up in Office. Continue aspirin. Call for problems.     Electronically signed by Tara Richardson MD on 1/20/2021 at 11:13 AM

## 2021-01-20 NOTE — DISCHARGE SUMMARY
VASCULAR SURGERY   DISCHARGE SUMMARY      Patient Identification  Tremayne Card is a 78 y.o. male. :  1940  Admit Date:  2021    Discharge date:   No discharge date for patient encounter. Disposition: home    Discharge Diagnoses:   Patient Active Problem List   Diagnosis    AAA (abdominal aortic aneurysm) without rupture (HCC)    Primary osteoarthritis of left knee    Knee effusion, left    Staphylococcal septicemia ([address]    Lumbar and sacral osteoarthritis    Lumbar stenosis with neurogenic claudication    Degenerative spondylolisthesis    DDD (degenerative disc disease), thoracolumbar    COPD (chronic obstructive pulmonary disease) (HCC)    Elevated PSA    Staphylococcal arthritis of left knee (HCC)    Atrial fibrillation (HCC)    Acute cystitis without hematuria    Thrombocytopenia (HCC)    Severe malnutrition (HCC)    Prediabetes    Acute osteomyelitis of thoracic spine (HCC)    Thoracic discitis    Osteomyelitis of thoracic region ([address]    Decubitus ulcer of left buttock, stage 3 (HCC)    Iron deficiency anemia    Class 2 obesity due to excess calories with serious comorbidity in adult    Iron deficiency anemia due to chronic blood loss    Decubitus ulcer of sacral region, stage 3 ([address]    Osteomyelitis (HCC)    Decubitus ulcer of left buttock, stage 4 (HCC)         Consults: IM    Surgery: EVAR    Patient Instructions: Activity: no heavy lifting, pushing, pulling for 6 weeks, no driving for 2 weeks or while on analgesics  Diet: As tolerated  Follow-up with Yury Chung MD in 3-4 weeks. See pre-printed instructions in chart and given to patient upon discharge.     Discharge Medications:      Cally Dickens [facility] Medication Instructions [id number]    Printed on:21 1118   Medication Information                      aspirin 81 MG tablet  Take 81 mg by mouth daily HYDROcodone-acetaminophen (NORCO) 5-325 MG per tablet  Take 1 tablet by mouth every 6 hours as needed for Pain for up to 7 days. Intended supply: 7 days. Take lowest dose possible to manage pain             Pediatric Multiple Vit-C-FA (FRUITY CHEWABLES MULTIVITAMIN PO)  Take by mouth             tamsulosin (FLOMAX) 0.4 MG capsule  Take 0.4 mg by mouth daily                  HPI and Hospital Course: 18-year-old male underwent EVAR without incident. Normal postoperative course. He was discharged home on postoperative day #1 in good condition. Instructed to follow-up in the office in 3 to 4 weeks. Continue aspirin 325 mg daily.         Per Dickens MD FACS

## 2021-01-20 NOTE — PROGRESS NOTES
Mepilex placed on pts sacrum. Patient has Hx of sacral wound. No open areas now. Divot where old wound was remains. Some pinkness near old sore but no open areas.

## 2021-01-20 NOTE — PROGRESS NOTES
CLINICAL PHARMACY NOTE: MEDS TO [address] Select Patient?: No  Total # of Prescriptions Filled: 2   The following medications were delivered to the patient:  · HYDROCODONE/APAP 5/325 MG TABS  · ASPIRIN 325 MG TABS (OTC)  Total # of Interventions Completed: 0  Time Spent (min): 5    Additional Documentation:  DELIVERED TO THE PT'S ROOM 1/20/21. $4.40 COPAY, PAID CASH.

## 2021-01-20 NOTE — FLOWSHEET NOTE
SPIRITUAL CARE   PROGRESS NOTE    Room # 2027/2027-01   Name: Jocy Sanchez            Druze: Anabaptist     Shift date: 1/20/21 Shift day: Wednesday     Reason for visit: Routine    I visited the patient and family. Admit Date & Time: 1/19/2021  6:36 AM    Assessment:  Jocy Sanchez is a 78 y.o. male in the hospital because he got a \"stent put in.\" Upon entering the room pt was laying in bed watching TV with adult son at bedside. Pt was very receptive to visit and provided life review to writer, such as talking about his [place] [place]" and his big family including adult children, adult grandchildren, and great-grandchildren. He seems to be in good spirits and said that he is eager to go home soon. Intervention:  I introduced myself and my title as  I offered space for the pt  to express feelings, needs, and concerns and provided a ministry presence. I provided an active listening presence. Outcome: The pt was grateful and receptive to the visit. Plan:  Chaplains will remain available to offer spiritual and emotional support as needed. 01/20/21 1117   Encounter Summary   Services provided to: Patient and family together   Referral/Consult From: [address] Visiting   (1/20/21)   Complexity of Encounter Low   Length of Encounter 15 minutes   Spiritual Assessment Completed Yes   Routine   Type Initial   Assessment Calm; Approachable; Hopeful;Coping;Peaceful   Intervention Active listening;Explored feelings, thoughts, concerns;Nurtured hope;Sustaining presence/ Ministry of presence; Discussed meaning/purpose;Discussed relationship with God;Discussed belief system/Faith practices/dwaine;Discussed illness/injury and it's impact; Develop care plan   Outcome Acceptance;Comfort;Expressed gratitude;Expressed feelings of mireya, peace, and/or awe;Engaged in conversation; Shared life review;Expressed feelings/needs/concerns;Coping;Encouraged; Hopeful;Receptive Electronically signed by Venita Coronel, [facility]

## 2021-01-23 LAB
ABO/RH: NORMAL
ANTIBODY SCREEN: NEGATIVE
ARM BAND NUMBER: NORMAL
BLD PROD TYP BPU: NORMAL
CROSSMATCH RESULT: NORMAL
DISPENSE STATUS BLOOD BANK: NORMAL
EXPIRATION DATE: NORMAL
TRANSFUSION STATUS: NORMAL
UNIT DIVISION: 0
UNIT NUMBER: NORMAL

## 2022-12-12 NOTE — PROGRESS NOTES
Sent chest xray to Dr. Vamshi Crawford for review only Advancement Flap (Single) Text: The defect edges were debeveled with a #15 scalpel blade.  With the objective of achieving maximal preservation of function and appearance,  a single advancement flap was deemed most appropriate.  Using a sterile surgical marker, an appropriate advancement flap was drawn incorporating the defect and placing the expected incisions within the relaxed skin tension lines where possible.    The area thus outlined was incised with a #15 scalpel blade.  The skin margins were undermined to an appropriate distance in all directions. \\nAfter obtaining complete hemostasis, the flap was advanced into position.

## (undated) DEVICE — SYRINGE, LUER SLIP, STERILE, 3ML: Brand: MEDLINE

## (undated) DEVICE — SUTURE VCRL SZ 2-0 L36IN ABSRB UD L36MM CT-1 1/2 CIR J945H

## (undated) DEVICE — TOTAL TRAY, DB, 100% SILI FOLEY, 16FR 10: Brand: MEDLINE

## (undated) DEVICE — GLOVE SURG SZ 85 L12IN THK91MIL BRN LTX FREE

## (undated) DEVICE — SUTURE VCRL SZ 3-0 L54IN ABSRB UD LIGAPAK REEL POLYGLACTIN J285G

## (undated) DEVICE — APPLIER HELI-FX CASSET ENDOANCHOR 16FR

## (undated) DEVICE — ATLAS®  PTA BALLOON DILATATION CATHETER 14 MM X 40 MM, 75 CM CATHETER: Brand: ATLAS®

## (undated) DEVICE — ADHESIVE SKIN CLSR 0.7ML TOP DERMBND ADV

## (undated) DEVICE — SYRINGE CNTRST 10ML LT BLU W/ CLR POLYCARB BRL MEDALLION

## (undated) DEVICE — RADIFOCUS GLIDEWIRE: Brand: GLIDEWIRE

## (undated) DEVICE — 3M™ RANGER™ BLOOD/FLUID WARMING STANDARD FLOW SET, 24200, 10/CASE: Brand: 3M™ RANGER™

## (undated) DEVICE — HYPODERMIC SAFETY NEEDLE: Brand: MAGELLAN

## (undated) DEVICE — SOLUTION PREP 4OZ 3% H PEROX 1ST AID ANTISEP ORAL DEBRIDING

## (undated) DEVICE — SET ADMIN 25ML L117IN PMP MOD CK VLV RLER CLMP 2 SMRTSITE

## (undated) DEVICE — 3M™ WARMING BLANKET, UPPER BODY, 10 PER CASE, 42268: Brand: BAIR HUGGER™

## (undated) DEVICE — SUTURE PERMA-HAND SZ 2-0 L30IN NONABSORBABLE BLK L26MM SH K833H

## (undated) DEVICE — Z DISCONTINUED USE 2624853 GLOVE SURG SZ 75 L12IN THK91MIL BRN LTX FREE

## (undated) DEVICE — FILTER CLP DISP FOR 5513E CLIPVAC

## (undated) DEVICE — CATHETER PTCA 8FR L100CM BLLN DIA10-46MM SHTH 12FR GWIRE

## (undated) DEVICE — SET INTRO SHTH 8FR L11CM 0.035IN J TIP GWIRE SIL ROT SUT

## (undated) DEVICE — SUTURE MCRYL SZ 4-0 L27IN ABSRB UD L19MM PS-2 1/2 CIR PRIM Y426H

## (undated) DEVICE — GAUZE,SPONGE,4"X4",16PLY,XRAY,STRL,LF: Brand: MEDLINE

## (undated) DEVICE — TUBING, SUCTION, 1/4" X 12', STRAIGHT: Brand: MEDLINE

## (undated) DEVICE — ATLAS®  PTA BALLOON DILATATION CATHETER 16 MM X 40 MM, 75 CM CATHETER: Brand: ATLAS®

## (undated) DEVICE — SURGICAL PROCEDURE TRAY CRD CATH SVMMC

## (undated) DEVICE — SOLUTION IV IRRIG LACTATED RINGERS 3000ML 2B7487

## (undated) DEVICE — GUIDEWIRE VASC L260CM DIA0.035IN TIP L7CM PTFE S STL STR

## (undated) DEVICE — SUTURE VCRL SZ 3-0 L36IN ABSRB UD L36MM CT-1 1/2 CIR J944H

## (undated) DEVICE — SUTURE PROL SZ 5-0 L30IN NONABSORBABLE BLU L13MM RB-2 1/2 8710H

## (undated) DEVICE — Z DISCONTINUED USE 2275686 GLOVE SURG SZ 8 L12IN FNGR THK13MIL WHT ISOLEX POLYISOPRENE

## (undated) DEVICE — BAG TRASH WST 122 CM 183 CM 1400 CC FEMALE LUER PVC DEPOT

## (undated) DEVICE — METER,URINE,400ML,DRAIN BAG,L/F,LL: Brand: MEDLINE

## (undated) DEVICE — CONTAINER,SPECIMEN,OR STERILE,4OZ: Brand: MEDLINE

## (undated) DEVICE — Device

## (undated) DEVICE — DRESSING PETRO W3XL8IN OIL EMUL N ADH GZ KNIT IMPREG CELOS

## (undated) DEVICE — CATHETER GUID 5FR L70CM 0.038IN W/O HYDRPHLC COAT RADPQ

## (undated) DEVICE — CATHETER GUID 16FR L62CM DEFLECTED TIP REACH 22MM NK

## (undated) DEVICE — GLOVE SURG SZ 8 L11.77IN FNGR THK9.8MIL STRW LTX POLYMER

## (undated) DEVICE — MANIFOLD REPROC SUCT 4 PRT F/NEPTUNE 2 WST MGMT SYS

## (undated) DEVICE — [AGGRESSIVE PLUS CUTTER, ARTHROSCOPIC SHAVER BLADE,  DO NOT RESTERILIZE,  DO NOT USE IF PACKAGE IS DAMAGED,  KEEP DRY,  KEEP AWAY FROM SUNLIGHT]: Brand: FORMULA

## (undated) DEVICE — BANDAGE COMPR W6INXL10YD ST M E WHITE/BEIGE

## (undated) DEVICE — [TOMCAT CUTTER, ARTHROSCOPIC SHAVER BLADE,  DO NOT RESTERILIZE,  DO NOT USE IF PACKAGE IS DAMAGED,  KEEP DRY,  KEEP AWAY FROM SUNLIGHT]: Brand: FORMULA

## (undated) DEVICE — TOWEL,OR,DSP,ST,BLUE,DLX,XR,4/PK,20PK/CS: Brand: MEDLINE

## (undated) DEVICE — ELECTRODE ES L3IN S STL BLDE INSUL DISP VALLEYLAB EDGE

## (undated) DEVICE — TUBING PMP L16FT MAIN DISP FOR AR-6400 AR-6475

## (undated) DEVICE — KIT HND CTRL 3 W STPCOCK ROT END 54IN PREM HI PRSS TBNG AT

## (undated) DEVICE — GAUZE, BORDER, 3"X6", 1.5"X4"PAD, STERIL: Brand: MEDLINE INDUSTRIES, INC.

## (undated) DEVICE — ZIMMER® STERILE DISPOSABLE TOURNIQUET CUFF WITH PROTECTIVE SLEEVE AND PLC, DUAL PORT, SINGLE BLADDER, 34 IN. (86 CM)

## (undated) DEVICE — PERCUTANEOUS ENTRY THINWALL NEEDLE  ONE-PART: Brand: COOK

## (undated) DEVICE — KIT EVAC 400CC DIA1/8IN H PAT 12.5IN 3 SPR RND SHP PVC DRN

## (undated) DEVICE — SHEATH INTRO 14FR L28CM 0.035IN GWIRE HYDRPHLC LOK

## (undated) DEVICE — SOLUTION IV 1000ML 0.9% SOD CHL PH 5 INJ USP VIAFLX PLAS

## (undated) DEVICE — SYRINGE 20ML LL S/C 50

## (undated) DEVICE — APPLICATOR MEDICATED 26 CC SOLUTION HI LT ORNG CHLORAPREP

## (undated) DEVICE — SET CATH 20GA L1.75IN RAD ART POLYUR RADPQ W/ INTEGR

## (undated) DEVICE — SHEATH INTRO 16FR L28CM 0.035IN GWIRE HYDRPHLC LOK

## (undated) DEVICE — YANKAUER,FLEXIBLE HANDLE,REGLR CAPACITY: Brand: MEDLINE INDUSTRIES, INC.

## (undated) DEVICE — CHLORAPREP 26ML ORANGE

## (undated) DEVICE — SOLUTION IV 500ML 0.9% SOD CHL PH 5 INJ USP VIAFLX PLAS

## (undated) DEVICE — BLANKET WRM W29.9XL79.1IN UP BODY FORC AIR MISTRAL-AIR

## (undated) DEVICE — PRESSURE MONITORING SET: Brand: TRUWAVE

## (undated) DEVICE — SUTURE ETHLN SZ 3-0 L18IN NONABSORBABLE BLK PS-2 L19MM 3/8 1669H

## (undated) DEVICE — SYRINGE MED 50ML LUERLOCK TIP